# Patient Record
Sex: FEMALE | Race: OTHER | Employment: FULL TIME | URBAN - METROPOLITAN AREA
[De-identification: names, ages, dates, MRNs, and addresses within clinical notes are randomized per-mention and may not be internally consistent; named-entity substitution may affect disease eponyms.]

---

## 2017-03-14 ENCOUNTER — OFFICE VISIT (OUTPATIENT)
Dept: FAMILY MEDICINE CLINIC | Age: 52
End: 2017-03-14

## 2017-03-14 VITALS
BODY MASS INDEX: 29.25 KG/M2 | OXYGEN SATURATION: 99 % | DIASTOLIC BLOOD PRESSURE: 90 MMHG | HEART RATE: 94 BPM | HEIGHT: 66 IN | WEIGHT: 182 LBS | TEMPERATURE: 98.4 F | RESPIRATION RATE: 12 BRPM | SYSTOLIC BLOOD PRESSURE: 140 MMHG

## 2017-03-14 DIAGNOSIS — E78.00 HYPERCHOLESTEREMIA: Primary | ICD-10-CM

## 2017-03-14 DIAGNOSIS — Z12.11 SCREENING FOR COLON CANCER: ICD-10-CM

## 2017-03-14 NOTE — PROGRESS NOTES
HISTORY OF PRESENT ILLNESS  Gina Padron is a 54 y.o. female. HPI  Patient is here today for evaluation and treatment of: Cholesterol problem    Cholesterol:   Pt is on zocor for her cholesterol. She tolerates med well, ;  She attempts to manage her cholesterol with diet and exercise. She has no complaints. Pt will be due for colo in April when she turns 52. She agrees to getting this set up. PMH,  Meds, Allergies, Family History, Social history reviewed        Current Outpatient Prescriptions:     simvastatin (ZOCOR) 40 mg tablet, take 1 tablet by mouth every evening, Disp: 30 Tab, Rfl: 6    medroxyprogesterone (DEPO-PROVERA) 150 mg/mL Syrg, 150 mg by IntraMUSCular route once., Disp: , Rfl:     MULTIVITAMINS (MULTI-VITAMIN PO), Take  by mouth., Disp: , Rfl:     cyclobenzaprine (FLEXERIL) 10 mg tablet, Take 1 Tab by mouth three (3) times daily as needed for Muscle Spasm(s). , Disp: 30 Tab, Rfl: 0    Review of Systems   Constitutional: Negative for chills and fever. Cardiovascular: Negative for chest pain and palpitations. Physical Exam   Constitutional: She appears well-developed and well-nourished. No distress. Neck: Neck supple. No thyromegaly present. Cardiovascular: Normal rate and regular rhythm. Exam reveals no gallop and no friction rub. No murmur heard. Pulmonary/Chest: Breath sounds normal. No respiratory distress. She has no wheezes. She has no rales. Musculoskeletal: She exhibits no edema. Nursing note and vitals reviewed.      Visit Vitals    /90    Pulse 94    Temp 98.4 °F (36.9 °C) (Oral)    Resp 12    Ht 5' 6\" (1.676 m)    Wt 182 lb (82.6 kg)    SpO2 99%    BMI 29.38 kg/m2     Lab Results   Component Value Date/Time    Cholesterol, total 151 09/14/2016 08:59 AM    HDL Cholesterol 49 09/14/2016 08:59 AM    LDL, calculated 93.4 09/14/2016 08:59 AM    VLDL, calculated 8.6 09/14/2016 08:59 AM    Triglyceride 43 09/14/2016 08:59 AM    CHOL/HDL Ratio 3.1 09/14/2016 08:59 AM     Lab Results   Component Value Date/Time    Glucose 98 09/14/2016 08:59 AM       ASSESSMENT and PLAN    ICD-10-CM ICD-9-CM    1. Hypercholesteremia E78.00 272.0 LIPID PANEL      METABOLIC PANEL, BASIC      AST      ALT   2. Screening for colon cancer Z12.11 V76.51 REFERRAL TO COLON AND RECTAL SURGERY       As above,   above all stable unless otherwise noted  Labs as ordered  Continue current meds as ordered  Placed order for referral for colonoscopy  Follow-up Disposition:  Return in about 6 months (around 9/14/2017) for well exam.  An After Visit Summary was printed and given to the patient. This has been fully explained to the patient, who indicates understanding.

## 2017-03-14 NOTE — PROGRESS NOTES
1. Have you been to the ER, urgent care clinic since your last visit? Hospitalized since your last visit? No    2. Have you seen or consulted any other health care providers outside of the [address] [place] [address] since your last visit? Include any pap smears or colon screening.  No

## 2017-03-14 NOTE — PATIENT INSTRUCTIONS
Colon Cancer Screening: Care Instructions  Your Care Instructions  Colorectal cancer occurs in the colon or rectum. That's the lower part of your digestive system. It is the second-leading cause of cancer deaths in the [place]. It often starts with small growths called polyps in the colon or rectum. Polyps are usually found with screening tests. Depending on the type of test, any polyps found may be removed during the tests. Colorectal cancer usually does not cause symptoms at first. But regular tests can help find it early, before it spreads and becomes harder to treat. Experts advise routine tests for colon cancer for people starting at age 48. And they advise people with a higher risk of colon cancer to get tested sooner. Talk with your doctor about when you should start testing. Discuss which tests you need. Follow-up care is a key part of your treatment and safety. Be sure to make and go to all appointments, and call your doctor if you are having problems. It's also a good idea to know your test results and keep a list of the medicines you take. What are the main screening tests for colon cancer? · Stool tests. These include the fecal immunochemical test (FIT) and the fecal occult blood test (FOBT). These tests check stool samples for signs of cancer. If your test is positive, you will need to have a colonoscopy. · Sigmoidoscopy. This test lets your doctor look at the lining of your rectum and the lowest part of your colon. Your doctor uses a lighted tube called a sigmoidoscope. This test can't find cancers or polyps in the upper part of your colon. In some cases, polyps that are found can be removed. But if your doctor finds polyps, you will need to have a colonoscopy to check the upper part of your colon. · Colonoscopy. This test lets your doctor look at the lining of your rectum and your entire colon. The doctor uses a thin, flexible tool called a colonoscope.  It can also be used to remove polyps or get a tissue sample (biopsy). What tests do you need? The following guidelines are for people age 48 and over who are not at high risk for colorectal cancer. You may have at least one of these tests as directed by your doctor. · Fecal immunochemical test (FIT) or fecal occult blood test (FOBT) every year  · Sigmoidoscopy every 5 years  · Colonoscopy every 10 years  If you are age 76 and have had regular screenings or are age [de-identified] or older, you may not need screening. Talk with your doctor about when you need to be tested. And discuss which tests are right for you. Your doctor may recommend earlier or more frequent testing if you:  · Have had colorectal cancer before. · Have had colon polyps. · Have symptoms of colorectal cancer. These include blood in your stool and changes in your bowel habits. · Have a parent, brother or sister, or child with colon polyps or colorectal cancer. · Have a bowel disease. This includes ulcerative colitis and Crohn's disease. · Have a rare polyp syndrome that runs in families, such as familial adenomatous polyposis (FAP). · Have had radiation treatments to the belly or pelvis. When should you call for help? Call your doctor now or seek immediate medical care if:  · Your stools are black and tarlike or have streaks of blood. · You have pain or tenderness in your lower belly. Watch closely for changes in your health, and be sure to contact your doctor if:  · You have diarrhea, constipation, or another change in bowel habits that does not get better. · Your stools are narrower than usual.  · You lose weight and you don't know why. · You have any questions about your screening tests or schedule. Where can you learn more? Go to http://jose guadalupe-juan josé.info/. Enter M541 in the search box to learn more about \"Colon Cancer Screening: Care Instructions. \"  Current as of: July 26, 2016  Content Version: 11.1  © 5651-4817 "[employer].", [facility].  Care instructions adapted under license by [employer]wd[employer] (which disclaims liability or warranty for this information). If you have questions about a medical condition or this instruction, always ask your healthcare professional. [address] any warranty or liability for your use of this information.

## 2017-03-14 NOTE — MR AVS SNAPSHOT
Visit Information Date & Time Provider Department Dept. Phone Encounter #  
 3/14/2017  3:00 PM Milli Lozano [facility] [id number] Follow-up Instructions Return in about 6 months (around 9/14/2017) for well exam. Upcoming Health Maintenance Date Due DTaP/Tdap/Td series (1 - Tdap) 4/16/1988 PAP AKA CERVICAL CYTOLOGY 9/14/2018 Allergies as of 3/14/2017  Review Complete On: 3/14/2017 By: Milli Lozano MD  
 No Known Allergies Current Immunizations  Never Reviewed No immunizations on file. Not reviewed this visit You Were Diagnosed With   
  
 Codes Comments Hypercholesteremia    -  Primary ICD-10-CM: E78.00 ICD-9-CM: 272.0 Screening for colon cancer     ICD-10-CM: Z12.11 ICD-9-CM: V76.51 Vitals BP Pulse Temp Resp Height(growth percentile) Weight(growth percentile) 140/90 94 98.4 °F (36.9 °C) (Oral) 12 5' 6\" (1.676 m) 182 lb (82.6 kg) SpO2 BMI OB Status Smoking Status 99% 29.38 kg/m2 Injection Never Smoker Vitals History BMI and BSA Data Body Mass Index Body Surface Area  
 29.38 kg/m 2 1.96 m 2 Preferred Pharmacy Pharmacy Name Phone [facility] [address] [place] [address] [phone] Your Updated Medication List  
  
   
This list is accurate as of: 3/14/17  3:56 PM.  Always use your most recent med list.  
  
  
  
  
 DEPO-PROVERA 150 mg/mL Syrg Generic drug:  medroxyPROGESTERone 150 mg by IntraMUSCular route once. MULTI-VITAMIN PO Take  by mouth. simvastatin 40 mg tablet Commonly known as:  ZOCOR  
take 1 tablet by mouth every evening Follow-up Instructions Return in about 6 months (around 9/14/2017) for well exam. To-Do List   
 03/28/2017 Lab:  ALT   
  
 03/28/2017 Lab:  AST   
  
 03/28/2017 Lab:  LIPID PANEL   
  
 03/28/2017 Lab: METABOLIC PANEL, BASIC   
  
 03/28/2017 Outpatient Referral:  REFERRAL TO COLON AND RECTAL SURGERY Referral Information Referral ID Referred By Referred To  
  
 [id number] Scottie Aguilar MD   
   [address] [place] [address] [address] [facility] Lesele Escalante. Phone: [phone] Fax: [phone] Visits Status Start Date End Date 1 New Request 3/14/17 3/14/18 If your referral has a status of pending review or denied, additional information will be sent to support the outcome of this decision. Patient Instructions Colon Cancer Screening: Care Instructions Your Care Instructions Colorectal cancer occurs in the colon or rectum. That's the lower part of your digestive system. It is the second-leading cause of cancer deaths in the [place]. It often starts with small growths called polyps in the colon or rectum. Polyps are usually found with screening tests. Depending on the type of test, any polyps found may be removed during the tests. Colorectal cancer usually does not cause symptoms at first. But regular tests can help find it early, before it spreads and becomes harder to treat. Experts advise routine tests for colon cancer for people starting at age 48. And they advise people with a higher risk of colon cancer to get tested sooner. Talk with your doctor about when you should start testing. Discuss which tests you need. Follow-up care is a key part of your treatment and safety. Be sure to make and go to all appointments, and call your doctor if you are having problems. It's also a good idea to know your test results and keep a list of the medicines you take. What are the main screening tests for colon cancer? · Stool tests. These include the fecal immunochemical test (FIT) and the fecal occult blood test (FOBT).  These tests check stool samples for signs of cancer. If your test is positive, you will need to have a colonoscopy. · Sigmoidoscopy. This test lets your doctor look at the lining of your rectum and the lowest part of your colon. Your doctor uses a lighted tube called a sigmoidoscope. This test can't find cancers or polyps in the upper part of your colon. In some cases, polyps that are found can be removed. But if your doctor finds polyps, you will need to have a colonoscopy to check the upper part of your colon. · Colonoscopy. This test lets your doctor look at the lining of your rectum and your entire colon. The doctor uses a thin, flexible tool called a colonoscope. It can also be used to remove polyps or get a tissue sample (biopsy). What tests do you need? The following guidelines are for people age 48 and over who are not at high risk for colorectal cancer. You may have at least one of these tests as directed by your doctor. · Fecal immunochemical test (FIT) or fecal occult blood test (FOBT) every year · Sigmoidoscopy every 5 years · Colonoscopy every 10 years If you are age 76 and have had regular screenings or are age [de-identified] or older, you may not need screening. Talk with your doctor about when you need to be tested. And discuss which tests are right for you. Your doctor may recommend earlier or more frequent testing if you: 
· Have had colorectal cancer before. · Have had colon polyps. · Have symptoms of colorectal cancer. These include blood in your stool and changes in your bowel habits. · Have a parent, brother or sister, or child with colon polyps or colorectal cancer. · Have a bowel disease. This includes ulcerative colitis and Crohn's disease. · Have a rare polyp syndrome that runs in families, such as familial adenomatous polyposis (FAP). · Have had radiation treatments to the belly or pelvis. When should you call for help? Call your doctor now or seek immediate medical care if: · Your stools are black and tarlike or have streaks of blood. · You have pain or tenderness in your lower belly. Watch closely for changes in your health, and be sure to contact your doctor if: 
· You have diarrhea, constipation, or another change in bowel habits that does not get better. · Your stools are narrower than usual. 
· You lose weight and you don't know why. · You have any questions about your screening tests or schedule. Where can you learn more? Go to http://jose guadalupe-juan josé.info/. Enter M541 in the search box to learn more about \"Colon Cancer Screening: Care Instructions. \" Current as of: July 26, 2016 Content Version: 11.1 © 0104-1697 "[employer]. Care instructions adapted under license by [employer] (which disclaims liability or warranty for this information). If you have questions about a medical condition or this instruction, always ask your healthcare professional. [place] [address] any warranty or liability for your use of this information. Introducing \[facility]\"" & HEALTH SERVICES! Dear Isabella Riddle: Thank you for requesting a Flowbox account. Our records indicate that you already have an active Flowbox account. You can access your account anytime at [email or web address] Did you know that you can access your hospital and ER discharge instructions at any time in Flowbox? You can also review all of your test results from your hospital stay or ER visit. Additional Information If you have questions, please visit the Frequently Asked Questions section of the Flowbox website at [email or web address] Remember, Flowbox is NOT to be used for urgent needs. For medical emergencies, dial 911. Now available from your iPhone and Android! Please provide this summary of care documentation to your next provider. Your primary care clinician is listed as [address].  If you have any questions after today's visit, please call [phone].

## 2017-05-09 ENCOUNTER — HOSPITAL ENCOUNTER (OUTPATIENT)
Dept: LAB | Age: 52
Discharge: HOME | End: 2017-05-09
Payer: OTHER GOVERNMENT

## 2017-05-09 DIAGNOSIS — E78.00 HYPERCHOLESTEREMIA: ICD-10-CM

## 2017-05-09 LAB
ALT SERPL-CCNC: 32 U/L (ref 13–56)
ANION GAP BLD CALC-SCNC: 10 MMOL/L (ref 3–18)
AST SERPL W P-5'-P-CCNC: 21 U/L (ref 15–37)
BUN SERPL-MCNC: 17 MG/DL (ref 7–18)
BUN/CREAT SERPL: 20 (ref 12–20)
CALCIUM SERPL-MCNC: 9.4 MG/DL (ref 8.5–10.1)
CHLORIDE SERPL-SCNC: 106 MMOL/L (ref 100–108)
CHOLEST SERPL-MCNC: 156 MG/DL
CO2 SERPL-SCNC: 25 MMOL/L (ref 21–32)
CREAT SERPL-MCNC: 0.87 MG/DL (ref 0.6–1.3)
GLUCOSE SERPL-MCNC: 102 MG/DL (ref 74–99)
HDLC SERPL-MCNC: 44 MG/DL (ref 40–60)
HDLC SERPL: 3.5 {RATIO} (ref 0–5)
LDLC SERPL CALC-MCNC: 104 MG/DL (ref 0–100)
LIPID PROFILE,FLP: ABNORMAL
POTASSIUM SERPL-SCNC: 3.8 MMOL/L (ref 3.5–5.5)
SODIUM SERPL-SCNC: 141 MMOL/L (ref 136–145)
TRIGL SERPL-MCNC: 40 MG/DL (ref ?–150)
VLDLC SERPL CALC-MCNC: 8 MG/DL

## 2017-05-09 PROCEDURE — 80061 LIPID PANEL: CPT | Performed by: FAMILY MEDICINE

## 2017-05-09 PROCEDURE — 80048 BASIC METABOLIC PNL TOTAL CA: CPT | Performed by: FAMILY MEDICINE

## 2017-05-09 PROCEDURE — 36415 COLL VENOUS BLD VENIPUNCTURE: CPT | Performed by: FAMILY MEDICINE

## 2017-05-09 PROCEDURE — 84450 TRANSFERASE (AST) (SGOT): CPT | Performed by: FAMILY MEDICINE

## 2017-05-09 PROCEDURE — 84460 ALANINE AMINO (ALT) (SGPT): CPT | Performed by: FAMILY MEDICINE

## 2017-06-12 RX ORDER — SIMVASTATIN 40 MG/1
TABLET, FILM COATED ORAL
Qty: 30 TAB | Refills: 6 | Status: SHIPPED | OUTPATIENT
Start: 2017-06-12 | End: 2018-03-11 | Stop reason: SDUPTHER

## 2017-08-02 ENCOUNTER — HOSPITAL ENCOUNTER (OUTPATIENT)
Dept: ULTRASOUND IMAGING | Age: 52
Discharge: HOME | End: 2017-08-02
Attending: INTERNAL MEDICINE
Payer: OTHER GOVERNMENT

## 2017-08-02 DIAGNOSIS — E04.2 NONTOXIC MULTINODULAR GOITER: ICD-10-CM

## 2017-08-02 PROCEDURE — 76536 US EXAM OF HEAD AND NECK: CPT

## 2017-09-14 ENCOUNTER — OFFICE VISIT (OUTPATIENT)
Dept: FAMILY MEDICINE CLINIC | Age: 52
End: 2017-09-14

## 2017-09-14 ENCOUNTER — HOSPITAL ENCOUNTER (OUTPATIENT)
Dept: LAB | Age: 52
Discharge: HOME | End: 2017-09-14
Payer: OTHER GOVERNMENT

## 2017-09-14 VITALS
DIASTOLIC BLOOD PRESSURE: 86 MMHG | WEIGHT: 182 LBS | TEMPERATURE: 98.3 F | RESPIRATION RATE: 16 BRPM | BODY MASS INDEX: 29.25 KG/M2 | OXYGEN SATURATION: 98 % | HEIGHT: 66 IN | HEART RATE: 100 BPM | SYSTOLIC BLOOD PRESSURE: 126 MMHG

## 2017-09-14 DIAGNOSIS — E78.00 HYPERCHOLESTEREMIA: Primary | ICD-10-CM

## 2017-09-14 DIAGNOSIS — E78.00 HYPERCHOLESTEREMIA: ICD-10-CM

## 2017-09-14 LAB
ALT SERPL-CCNC: 24 U/L (ref 13–56)
ANION GAP SERPL CALC-SCNC: 7 MMOL/L (ref 3–18)
AST SERPL-CCNC: 14 U/L (ref 15–37)
BUN SERPL-MCNC: 14 MG/DL (ref 7–18)
BUN/CREAT SERPL: 16 (ref 12–20)
CALCIUM SERPL-MCNC: 9 MG/DL (ref 8.5–10.1)
CHLORIDE SERPL-SCNC: 109 MMOL/L (ref 100–108)
CHOLEST SERPL-MCNC: 169 MG/DL
CO2 SERPL-SCNC: 25 MMOL/L (ref 21–32)
CREAT SERPL-MCNC: 0.86 MG/DL (ref 0.6–1.3)
GLUCOSE SERPL-MCNC: 103 MG/DL (ref 74–99)
HDLC SERPL-MCNC: 46 MG/DL (ref 40–60)
HDLC SERPL: 3.7 {RATIO} (ref 0–5)
LDLC SERPL CALC-MCNC: 113 MG/DL (ref 0–100)
LIPID PROFILE,FLP: ABNORMAL
POTASSIUM SERPL-SCNC: 3.8 MMOL/L (ref 3.5–5.5)
SODIUM SERPL-SCNC: 141 MMOL/L (ref 136–145)
TRIGL SERPL-MCNC: 50 MG/DL (ref ?–150)
VLDLC SERPL CALC-MCNC: 10 MG/DL

## 2017-09-14 PROCEDURE — 80048 BASIC METABOLIC PNL TOTAL CA: CPT | Performed by: FAMILY MEDICINE

## 2017-09-14 PROCEDURE — 36415 COLL VENOUS BLD VENIPUNCTURE: CPT | Performed by: FAMILY MEDICINE

## 2017-09-14 PROCEDURE — 84450 TRANSFERASE (AST) (SGOT): CPT | Performed by: FAMILY MEDICINE

## 2017-09-14 PROCEDURE — 84460 ALANINE AMINO (ALT) (SGPT): CPT | Performed by: FAMILY MEDICINE

## 2017-09-14 PROCEDURE — 80061 LIPID PANEL: CPT | Performed by: FAMILY MEDICINE

## 2017-09-14 NOTE — PROGRESS NOTES
HISTORY OF PRESENT ILLNESS  Yusuf Li is a 50 y.o. female. HPI  Patient is here today for evaluation and treatment of: Cholesterol problem    Cholesterol:   She is on zocor for cholesterol; Takes zocor daily. She feels well; no new complaints. Will be getting a thyroid US biopsy soon. She is followed by  Dr. Powell. She declines a well exam and flu shot today. Blood sugar was noted to be elevated at last check; will be checked again today;  Pt is fasting      Current Outpatient Prescriptions:     simvastatin (ZOCOR) 40 mg tablet, take 1 tablet by mouth every evening, Disp: 30 Tab, Rfl: 6    medroxyprogesterone (DEPO-PROVERA) 150 mg/mL Syrg, 150 mg by IntraMUSCular route once., Disp: , Rfl:     MULTIVITAMINS (MULTI-VITAMIN PO), Take  by mouth., Disp: , Rfl:     Review of Systems   Constitutional: Negative for fever. Cardiovascular: Negative for chest pain and palpitations. Physical Exam   Constitutional: She appears well-developed and well-nourished. No distress. Neck: Thyromegaly present. Cardiovascular: Normal rate and regular rhythm. Exam reveals no gallop and no friction rub. No murmur heard. Pulmonary/Chest: Breath sounds normal. No respiratory distress. She has no wheezes. She has no rales. Musculoskeletal: Normal range of motion. She exhibits no edema. Nursing note and vitals reviewed. ASSESSMENT and PLAN    ICD-10-CM ICD-9-CM    1. Hypercholesteremia Z00.87 225.2 METABOLIC PANEL, BASIC      LIPID PANEL      AST      ALT       As above,   above all stable unless otherwise noted  Labs as ordered  Continue current meds as ordered  Follow-up Disposition:  Return in about 6 months (around 3/14/2018) for well exam.  An After Visit Summary was printed and given to the patient. This has been fully explained to the patient, who indicates understanding.

## 2017-09-14 NOTE — PROGRESS NOTES
1. Have you been to the ER, urgent care clinic since your last visit? Hospitalized since your last visit? No    2. Have you seen or consulted any other health care providers outside of the [address] [place] [address] since your last visit? Include any pap smears or colon screening.  No

## 2017-09-14 NOTE — PATIENT INSTRUCTIONS

## 2017-09-14 NOTE — MR AVS SNAPSHOT
Visit Information Date & Time Provider Department Dept. Phone Encounter #  
 9/14/2017  8:20 AM Itzel Washington MD [facility] [place] [id number] Upcoming Health Maintenance Date Due DTaP/Tdap/Td series (1 - Tdap) 4/16/1988 FOBT Q 1 YEAR AGE 50-75 2/15/2018* PAP AKA CERVICAL CYTOLOGY 9/14/2018 BREAST CANCER SCRN MAMMOGRAM 12/14/2018 *Topic was postponed. The date shown is not the original due date. Allergies as of 9/14/2017  Review Complete On: 9/14/2017 By: Eliel Parry LPN No Known Allergies Current Immunizations  Never Reviewed No immunizations on file. Not reviewed this visit You Were Diagnosed With   
  
 Codes Comments Hypercholesteremia    -  Primary ICD-10-CM: E78.00 ICD-9-CM: 272.0 Vitals BP Pulse Temp Resp Height(growth percentile) Weight(growth percentile) 126/86 (BP 1 Location: Left arm, BP Patient Position: Sitting) 100 98.3 °F (36.8 °C) (Oral) 16 5' 6\" (1.676 m) 182 lb (82.6 kg) SpO2 BMI OB Status Smoking Status 98% 29.38 kg/m2 Injection Never Smoker BMI and BSA Data Body Mass Index Body Surface Area  
 29.38 kg/m 2 1.96 m 2 Preferred Pharmacy Pharmacy Name Phone [facility] [address] [place] [address], [address] [place], [place] [address] [phone] Your Updated Medication List  
  
   
This list is accurate as of: 9/14/17  8:48 AM.  Always use your most recent med list.  
  
  
  
  
 DEPO-PROVERA 150 mg/mL Syrg Generic drug:  medroxyPROGESTERone 150 mg by IntraMUSCular route once. MULTI-VITAMIN PO Take  by mouth. simvastatin 40 mg tablet Commonly known as:  ZOCOR  
take 1 tablet by mouth every evening To-Do List   
 09/14/2017 Lab:  ALT   
  
 09/14/2017 Lab:  AST   
  
 09/14/2017 Lab:  LIPID PANEL   
  
 09/14/2017 Lab:  METABOLIC PANEL, BASIC Patient Instructions High Cholesterol: Care Instructions Your Care Instructions Cholesterol is a type of fat in your blood. It is needed for many body functions, such as making new cells. Cholesterol is made by your body. It also comes from food you eat. High cholesterol means that you have too much of the fat in your blood. This raises your risk of a heart attack and stroke. LDL and HDL are part of your total cholesterol. LDL is the \"bad\" cholesterol. High LDL can raise your risk for heart disease, heart attack, and stroke. HDL is the \"good\" cholesterol. It helps clear bad cholesterol from the body. High HDL is linked with a lower risk of heart disease, heart attack, and stroke. Your cholesterol levels help your doctor find out your risk for having a heart attack or stroke. You and your doctor can talk about whether you need to lower your risk and what treatment is best for you. A heart-healthy lifestyle along with medicines can help lower your cholesterol and your risk. The way you choose to lower your risk will depend on how high your risk is for heart attack and stroke. It will also depend on how you feel about taking medicines. Follow-up care is a key part of your treatment and safety. Be sure to make and go to all appointments, and call your doctor if you are having problems. It's also a good idea to know your test results and keep a list of the medicines you take. How can you care for yourself at home? · Eat a variety of foods every day. Good choices include fruits, vegetables, whole grains (like oatmeal), dried beans and peas, nuts and seeds, soy products (like tofu), and fat-free or low-fat dairy products. · Replace butter, margarine, and hydrogenated or partially hydrogenated oils with olive and canola oils. (Canola oil margarine without trans fat is fine.) · Replace red meat with fish, poultry, and soy protein (like tofu). · Limit processed and packaged foods like chips, crackers, and cookies. · Bake, broil, or steam foods. Don't amyaa them. · Be physically active. Get at least 30 minutes of exercise on most days of the week. Walking is a good choice. You also may want to do other activities, such as running, swimming, cycling, or playing tennis or team sports. · Stay at a healthy weight or lose weight by making the changes in eating and physical activity listed above. Losing just a small amount of weight, even 5 to 10 pounds, can reduce your risk for having a heart attack or stroke. · Do not smoke. When should you call for help? Watch closely for changes in your health, and be sure to contact your doctor if: 
· You need help making lifestyle changes. · You have questions about your medicine. Where can you learn more? Go to http://jose guadalupe-juan josé.info/. Enter G645 in the search box to learn more about \"High Cholesterol: Care Instructions. \" Current as of: April 3, 2017 Content Version: 11.3 © 6332-7875 Vinted. Care instructions adapted under license by TuVox (which disclaims liability or warranty for this information). If you have questions about a medical condition or this instruction, always ask your healthcare professional. [address] any warranty or liability for your use of this information. Introducing [facility] & HEALTH SERVICES! Dear Jairo Salazar: Thank you for requesting a Now In Store account. Our records indicate that you already have an active Now In Store account. You can access your account anytime at [email or web address] Did you know that you can access your hospital and ER discharge instructions at any time in Now In Store? You can also review all of your test results from your hospital stay or ER visit. Additional Information If you have questions, please visit the Frequently Asked Questions section of the Now In Store website at [email or web address] Remember, MyChart is NOT to be used for urgent needs. For medical emergencies, dial 911. Now available from your iPhone and Android! Please provide this summary of care documentation to your next provider. Your primary care clinician is listed as [address]. If you have any questions after today's visit, please call [phone].

## 2018-03-12 RX ORDER — SIMVASTATIN 40 MG/1
TABLET, FILM COATED ORAL
Qty: 30 TAB | Refills: 6 | Status: SHIPPED | OUTPATIENT
Start: 2018-03-12 | End: 2018-11-08 | Stop reason: SDUPTHER

## 2018-03-14 ENCOUNTER — OFFICE VISIT (OUTPATIENT)
Dept: FAMILY MEDICINE CLINIC | Age: 53
End: 2018-03-14

## 2018-03-14 VITALS
BODY MASS INDEX: 29.25 KG/M2 | TEMPERATURE: 98.3 F | OXYGEN SATURATION: 96 % | SYSTOLIC BLOOD PRESSURE: 140 MMHG | RESPIRATION RATE: 16 BRPM | WEIGHT: 182 LBS | HEIGHT: 66 IN | DIASTOLIC BLOOD PRESSURE: 72 MMHG | HEART RATE: 102 BPM

## 2018-03-14 DIAGNOSIS — M62.838 MUSCLE SPASM OF LEFT SHOULDER: ICD-10-CM

## 2018-03-14 DIAGNOSIS — R03.0 ELEVATED BP WITHOUT DIAGNOSIS OF HYPERTENSION: ICD-10-CM

## 2018-03-14 DIAGNOSIS — E78.00 HYPERCHOLESTEREMIA: Primary | ICD-10-CM

## 2018-03-14 RX ORDER — CYCLOBENZAPRINE HCL 10 MG
10 TABLET ORAL
Qty: 30 TAB | Refills: 0 | Status: SHIPPED | OUTPATIENT
Start: 2018-03-14 | End: 2018-03-21

## 2018-03-14 NOTE — MR AVS SNAPSHOT
[facility] 
 
 
 [address] [address] [address] [address] 
[phone] Patient: Shanta Ramos MRN: [id number] :1965 Visit Information Date & Time Provider Department Dept. Phone Encounter #  
 3/14/2018  8:20 AM Juan Pablo Camilo[address] [phone] [id number] Follow-up Instructions Return in about 3 months (around 2018) for BP. Upcoming Health Maintenance Date Due DTaP/Tdap/Td series (1 - Tdap) 1988 FOBT Q 1 YEAR AGE 50-75 2017 PAP AKA CERVICAL CYTOLOGY 2018 BREAST CANCER SCRN MAMMOGRAM 2018 Allergies as of 3/14/2018  Review Complete On: 3/14/2018 By: Michelle Garcia LPN No Known Allergies Current Immunizations  Never Reviewed No immunizations on file. Not reviewed this visit You Were Diagnosed With   
  
 Codes Comments Hypercholesteremia    -  Primary ICD-10-CM: E78.00 ICD-9-CM: 272.0 Muscle spasm of left shoulder     ICD-10-CM: K33.910 ICD-9-CM: 728.85 Elevated BP without diagnosis of hypertension     ICD-10-CM: R03.0 ICD-9-CM: 796.2 Vitals BP Pulse Temp Resp Height(growth percentile) Weight(growth percentile) 140/72 (!) 102 98.3 °F (36.8 °C) (Oral) 16 5' 6\" (1.676 m) 182 lb (82.6 kg) SpO2 BMI OB Status Smoking Status 96% 29.38 kg/m2 Injection Never Smoker Vitals History BMI and BSA Data Body Mass Index Body Surface Area  
 29.38 kg/m 2 1.96 m 2 Preferred Pharmacy Pharmacy Name Phone [facility], [facility] [phone] Your Updated Medication List  
  
   
This list is accurate as of 3/14/18  8:59 AM.  Always use your most recent med list.  
  
  
  
  
 cyclobenzaprine 10 mg tablet Commonly known as:  FLEXERIL Take 1 Tab by mouth three (3) times daily as needed for Muscle Spasm(s). DEPO-PROVERA 150 mg/mL Syrg Generic drug:  medroxyPROGESTERone 150 mg by IntraMUSCular route once. MULTI-VITAMIN PO Take  by mouth. simvastatin 40 mg tablet Commonly known as:  ZOCOR  
take 1 tablet by mouth every evening Prescriptions Sent to Pharmacy Refills  
 cyclobenzaprine (FLEXERIL) 10 mg tablet 0 Sig: Take 1 Tab by mouth three (3) times daily as needed for Muscle Spasm(s). Class: Normal  
 [id number] [address] [place] [address], [address] [place] [address] #: [phone] Route: Oral  
  
Follow-up Instructions Return in about 3 months (around 6/14/2018) for BP. To-Do List   
 03/14/2018 Lab:  ALT   
  
 03/14/2018 Lab:  AST   
  
 03/14/2018 Lab:  LIPID PANEL   
  
 03/14/2018 Lab:  TSH 3RD GENERATION Patient Instructions Neck Spasm: Care Instructions Your Care Instructions A neck spasm is sudden tightness and pain in your neck muscles. A spasm may be caused by some activities or repeated movements. For example, you may be more likely to have a neck spasm if you slouch, paint a ceiling, work at a computer, or sleep with your neck twisted. But the cause isn't always clear. Home treatment includes using heat or ice, taking over-the-counter (OTC) pain medicines, and avoiding activities that may lead to neck pain. Gentle stretching, or treatments such as massage or manipulation, may also help ease a neck spasm. For a neck spasm that doesn't get better with home care, your doctor may prescribe medicine. He or she may also suggest exercise or physical therapy to help strengthen or relax your neck muscles. Follow-up care is a key part of your treatment and safety. Be sure to make and go to all appointments, and call your doctor if you are having problems. It's also a good idea to know your test results and keep a list of the medicines you take. How can you care for yourself at home? · To relieve pain, use heat or ice (whichever feels better) on the affected area. ¨ Put a warm water bottle, a heating pad set on low, or a warm cloth on your neck. Put a thin cloth between the heating pad and your skin. Do not go to sleep with a heating pad on your skin. ¨ Try ice or a cold pack on the area for 10 to 20 minutes at a time. Put a thin cloth between the ice and your skin. · Ask your doctor if you can take acetaminophen (such as Tylenol) or nonsteroidal anti-inflammatory drugs, such as ibuprofen or naproxen. Your doctor can prescribe stronger medicines if needed. Be safe with medicines. Read and follow all instructions on the label. · Stretch your muscles every day, especially before and after exercise and at bedtime. Regular stretching can help relax your muscles. · Try to find a pillow and a position in bed that help improve your night's rest. 
· Try to stay active. It's best to start activity slowly. If an exercise makes your painworse, stop doing it. When should you call for help? Call 911 anytime you think you may need emergency care. For example, call if: 
? · You are unable to move an arm or a leg at all. ?Call your doctor now or seek immediate medical care if: 
? · You have new or worse symptoms in your arms, legs, belly, or buttocks. Symptoms may include: ¨ Numbness or tingling. ¨ Weakness. ¨ Pain. ? · You lose bladder or bowel control. ? Watch closely for changes in your health, and be sure to contact your doctor if: 
? · You do not get better as expected. Where can you learn more? Go to http://jose guadlaupe-juan josé.info/. Enter V837 in the search box to learn more about \"Neck Spasm: Care Instructions. \" Current as of: March 21, 2017 Content Version: 11.4 © 9040-3917 [employer].  Care instructions adapted under license by [facility] (which disclaims liability or warranty for this information). If you have questions about a medical condition or this instruction, always ask your healthcare professional. [address] any warranty or liability for your use of this information. Introducing [facility] SERVICES! Dear Lolly Campos: Thank you for requesting a [employer] account. Our records indicate that you already have an active [employer] account. You can access your account anytime at [email or web address] Did you know that you can access your hospital and ER discharge instructions at any time in [employer]? You can also review all of your test results from your hospital stay or ER visit. Additional Information If you have questions, please visit the Frequently Asked Questions section of the [employer] website at [email or web address] Remember, [employer] is NOT to be used for urgent needs. For medical emergencies, dial 911. Now available from your iPhone and Android! Please provide this summary of care documentation to your next provider. Your primary care clinician is listed as [address]. If you have any questions after today's visit, please call [phone].

## 2018-03-14 NOTE — PATIENT INSTRUCTIONS
Neck Spasm: Care Instructions  Your Care Instructions  A neck spasm is sudden tightness and pain in your neck muscles. A spasm may be caused by some activities or repeated movements. For example, you may be more likely to have a neck spasm if you slouch, paint a ceiling, work at a computer, or sleep with your neck twisted. But the cause isn't always clear. Home treatment includes using heat or ice, taking over-the-counter (OTC) pain medicines, and avoiding activities that may lead to neck pain. Gentle stretching, or treatments such as massage or manipulation, may also help ease a neck spasm. For a neck spasm that doesn't get better with home care, your doctor may prescribe medicine. He or she may also suggest exercise or physical therapy to help strengthen or relax your neck muscles. Follow-up care is a key part of your treatment and safety. Be sure to make and go to all appointments, and call your doctor if you are having problems. It's also a good idea to know your test results and keep a list of the medicines you take. How can you care for yourself at home? · To relieve pain, use heat or ice (whichever feels better) on the affected area. ¨ Put a warm water bottle, a heating pad set on low, or a warm cloth on your neck. Put a thin cloth between the heating pad and your skin. Do not go to sleep with a heating pad on your skin. ¨ Try ice or a cold pack on the area for 10 to 20 minutes at a time. Put a thin cloth between the ice and your skin. · Ask your doctor if you can take acetaminophen (such as Tylenol) or nonsteroidal anti-inflammatory drugs, such as ibuprofen or naproxen. Your doctor can prescribe stronger medicines if needed. Be safe with medicines. Read and follow all instructions on the label. · Stretch your muscles every day, especially before and after exercise and at bedtime. Regular stretching can help relax your muscles.   · Try to find a pillow and a position in bed that help improve your night's rest.  · Try to stay active. It's best to start activity slowly. If an exercise makes your painworse, stop doing it. When should you call for help? Call 911 anytime you think you may need emergency care. For example, call if:  ? · You are unable to move an arm or a leg at all. ?Call your doctor now or seek immediate medical care if:  ? · You have new or worse symptoms in your arms, legs, belly, or buttocks. Symptoms may include:  ¨ Numbness or tingling. ¨ Weakness. ¨ Pain. ? · You lose bladder or bowel control. ? Watch closely for changes in your health, and be sure to contact your doctor if:  ? · You do not get better as expected. Where can you learn more? Go to http://jose guadalupe-juan josé.info/. Enter I010 in the search box to learn more about \"Neck Spasm: Care Instructions. \"  Current as of: March 21, 2017  Content Version: 11.4  © 7389-9118 [employer]. Care instructions adapted under license by [employer] (which disclaims liability or warranty for this information). If you have questions about a medical condition or this instruction, always ask your healthcare professional. [address] [address] any warranty or liability for your use of this information.

## 2018-03-14 NOTE — PROGRESS NOTES
HISTORY OF PRESENT ILLNESS  Isaac Felipe is a 50 y.o. female. HPI  Patient is here today for evaluation and treatment of: Cholesterol problem    Cholesterol:   Pt is on zocor; She is exercising; she attempts a lower cholesterol diet. She is not fasting. Gets ocassional muscle tightness in leftward neck and arm; had a muscle relaxant and this helped; Requests a refill. She has had no injury    BP initially elevated; better at second check; no h/o HTN      Current Outpatient Prescriptions:     simvastatin (ZOCOR) 40 mg tablet, take 1 tablet by mouth every evening, Disp: 30 Tab, Rfl: 6    medroxyprogesterone (DEPO-PROVERA) 150 mg/mL Syrg, 150 mg by IntraMUSCular route once., Disp: , Rfl:     MULTIVITAMINS (MULTI-VITAMIN PO), Take  by mouth., Disp: , Rfl:     PMH,  Meds, Allergies, Family History, Social history reviewed    Review of Systems   Constitutional: Negative for chills and fever. Respiratory: Negative for shortness of breath and wheezing. Cardiovascular: Negative for chest pain and palpitations. Physical Exam   Constitutional: She appears well-developed and well-nourished. No distress. Cardiovascular: Normal rate and regular rhythm. Exam reveals no gallop and no friction rub. No murmur heard. Pulmonary/Chest: Breath sounds normal. No respiratory distress. She has no wheezes. Musculoskeletal: She exhibits tenderness (in left upper arm; ROM left shoulder intact). Nursing note and vitals reviewed.     Lab Results   Component Value Date/Time    Cholesterol, total 169 09/14/2017 08:53 AM    HDL Cholesterol 46 09/14/2017 08:53 AM    LDL, calculated 113 (H) 09/14/2017 08:53 AM    VLDL, calculated 10 09/14/2017 08:53 AM    Triglyceride 50 09/14/2017 08:53 AM    CHOL/HDL Ratio 3.7 09/14/2017 08:53 AM     Lab Results   Component Value Date/Time    Sodium 141 09/14/2017 08:53 AM    Potassium 3.8 09/14/2017 08:53 AM    Chloride 109 (H) 09/14/2017 08:53 AM    CO2 25 09/14/2017 08:53 AM Anion gap 7 09/14/2017 08:53 AM    Glucose 103 (H) 09/14/2017 08:53 AM    BUN 14 09/14/2017 08:53 AM    Creatinine 0.86 09/14/2017 08:53 AM    BUN/Creatinine ratio 16 09/14/2017 08:53 AM    GFR est AA >60 09/14/2017 08:53 AM    GFR est non-AA >60 09/14/2017 08:53 AM    Calcium 9.0 09/14/2017 08:53 AM       ASSESSMENT and PLAN    ICD-10-CM ICD-9-CM    1. Hypercholesteremia- moderate control E78.00 272.0 LIPID PANEL      AST      ALT   2. Muscle spasm of left shoulder- recurrent M62.838 728.85 cyclobenzaprine (FLEXERIL) 10 mg tablet   3. Elevated BP without diagnosis of hypertension- new R03.0 796.2 TSH 3RD GENERATION       As above,    treatment plan as listed below  Orders Placed This Encounter    LIPID PANEL    AST    ALT    TSH 3RD GENERATION    cyclobenzaprine (FLEXERIL) 10 mg tablet     Warned of sedative effects of flexeril  above all stable unless otherwise noted  Labs as ordered  Continue current meds as ordered  Follow-up Disposition:  Return in about 3 months (around 6/14/2018) for BP. An After Visit Summary was printed and given to the patient.

## 2018-03-21 ENCOUNTER — HOSPITAL ENCOUNTER (EMERGENCY)
Age: 53
Discharge: HOME | End: 2018-03-21
Attending: EMERGENCY MEDICINE
Payer: OTHER GOVERNMENT

## 2018-03-21 VITALS
WEIGHT: 180 LBS | TEMPERATURE: 98.5 F | SYSTOLIC BLOOD PRESSURE: 156 MMHG | DIASTOLIC BLOOD PRESSURE: 73 MMHG | HEART RATE: 117 BPM | RESPIRATION RATE: 18 BRPM | HEIGHT: 66 IN | BODY MASS INDEX: 28.93 KG/M2 | OXYGEN SATURATION: 98 %

## 2018-03-21 DIAGNOSIS — J10.1 INFLUENZA A: Primary | ICD-10-CM

## 2018-03-21 LAB
FLUAV AG NPH QL IA: NEGATIVE
FLUBV AG NOSE QL IA: POSITIVE

## 2018-03-21 PROCEDURE — 74011250637 HC RX REV CODE- 250/637: Performed by: EMERGENCY MEDICINE

## 2018-03-21 PROCEDURE — 87804 INFLUENZA ASSAY W/OPTIC: CPT | Performed by: EMERGENCY MEDICINE

## 2018-03-21 PROCEDURE — 87081 CULTURE SCREEN ONLY: CPT | Performed by: EMERGENCY MEDICINE

## 2018-03-21 PROCEDURE — 99283 EMERGENCY DEPT VISIT LOW MDM: CPT

## 2018-03-21 RX ORDER — OSELTAMIVIR PHOSPHATE 75 MG/1
75 CAPSULE ORAL 2 TIMES DAILY
Qty: 10 CAP | Refills: 0 | Status: SHIPPED | OUTPATIENT
Start: 2018-03-21 | End: 2018-03-26

## 2018-03-21 RX ORDER — ACETAMINOPHEN 500 MG
1000 TABLET ORAL
Status: COMPLETED | OUTPATIENT
Start: 2018-03-21 | End: 2018-03-21

## 2018-03-21 RX ADMIN — ACETAMINOPHEN 1000 MG: 500 TABLET ORAL at 13:23

## 2018-03-21 NOTE — ED PROVIDER NOTES
Patient is a 50 y.o. female presenting with fever, sore throat, and cough. The history is provided by the patient. No  was used. Fever    This is a new problem. The current episode started more than 2 days ago. The problem occurs constantly. The problem has not changed since onset. The maximum temperature noted was 100 - 100.9 F. Associated symptoms include sore throat and cough. She has tried nothing for the symptoms. Sore Throat    Associated symptoms include cough. Cough   Associated symptoms include sore throat. Past Medical History:   Diagnosis Date    Facet arthropathy     Heart murmur 2006    High cholesterol     Iritis     Low back pain     Nausea     Spinal stenosis        Past Surgical History:   Procedure Laterality Date    HX ORTHOPAEDIC  2009    back surgery         Family History:   Problem Relation Age of Onset    Diabetes Mother     Heart Disease Father      CAD       Social History     Social History    Marital status:      Spouse name: N/A    Number of children: N/A    Years of education: N/A     Occupational History    Not on file. Social History Main Topics    Smoking status: Never Smoker    Smokeless tobacco: Never Used    Alcohol use No    Drug use: No    Sexual activity: Not on file     Other Topics Concern    Not on file     Social History Narrative         ALLERGIES: Review of patient's allergies indicates no known allergies. Review of Systems   Constitutional: Positive for fever. HENT: Positive for sore throat. Respiratory: Positive for cough. Cardiovascular: Negative. Gastrointestinal: Negative. Genitourinary: Negative. Musculoskeletal: Negative. Skin: Negative. Neurological: Negative. Hematological: Negative. Psychiatric/Behavioral: Negative.         Vitals:    03/21/18 1315 03/21/18 1400   BP: 154/85    Pulse: (!) 120    Resp: 18    Temp: 100.4 °F (38 °C) 98.5 °F (36.9 °C)   SpO2: 98%    Weight: 81.6 kg (180 lb)    Height: 5' 6\" (1.676 m)             Physical Exam   Constitutional: She is oriented to person, place, and time. She appears well-developed and well-nourished. HENT:   Head: Normocephalic and atraumatic. Nose: Nose normal.   Mouth/Throat: No oropharyngeal exudate. Eyes: Conjunctivae and EOM are normal. Pupils are equal, round, and reactive to light. Neck: Normal range of motion. Neck supple. Cardiovascular: Regular rhythm and normal heart sounds. Pulmonary/Chest: Effort normal and breath sounds normal.   Abdominal: Soft. Bowel sounds are normal.   Musculoskeletal: Normal range of motion. Neurological: She is alert and oriented to person, place, and time. Skin: Skin is warm and dry. Nursing note and vitals reviewed.        MDM  Number of Diagnoses or Management Options  Diagnosis management comments: Viral upper respiratory infection  Influenza       Amount and/or Complexity of Data Reviewed  Clinical lab tests: reviewed          ED Course       Procedures

## 2018-03-21 NOTE — ED TRIAGE NOTES
Patient c/o fever, cough and sore throat that began on Monday evening. Patient advises that she took motrin this morning. Denies productive cough.

## 2018-03-21 NOTE — DISCHARGE INSTRUCTIONS

## 2018-03-21 NOTE — ED NOTES
Current Discharge Medication List      START taking these medications    Details   oseltamivir (TAMIFLU) 75 mg capsule Take 1 Cap by mouth two (2) times a day for 5 days. Indications: INFLUENZA  Qty: 10 Cap, Refills: 0         CONTINUE these medications which have NOT CHANGED    Details   simvastatin (ZOCOR) 40 mg tablet take 1 tablet by mouth every evening  Qty: 30 Tab, Refills: 6      medroxyprogesterone (DEPO-PROVERA) 150 mg/mL Syrg 150 mg by IntraMUSCular route once. MULTIVITAMINS (MULTI-VITAMIN PO) Take  by mouth. Patient armband removed and shredded  Prescription sent to patient pharmacy and reviewed with patient.

## 2018-03-21 NOTE — LETTER
[address] [place] [address] [facility] 
[address] [facility] [id number] [phone] Work/School Note Date: 3/21/2018 To Whom It May concern: 
 
Julia Guerra was seen and treated today in the emergency room by the following provider(s): 
Attending Provider: Bernabe Lanes, MD.   
 
Julia Guerra may return to work on 3/24/18 Britton Somers Sincerely, 
 
 
 
 
James Herman RN

## 2018-03-23 LAB
B-HEM STREP THROAT QL CULT: NEGATIVE
BACTERIA SPEC CULT: NORMAL
SERVICE CMNT-IMP: NORMAL

## 2018-03-27 ENCOUNTER — OFFICE VISIT (OUTPATIENT)
Dept: FAMILY MEDICINE CLINIC | Age: 53
End: 2018-03-27

## 2018-03-27 VITALS
RESPIRATION RATE: 18 BRPM | WEIGHT: 180.4 LBS | DIASTOLIC BLOOD PRESSURE: 70 MMHG | OXYGEN SATURATION: 99 % | BODY MASS INDEX: 28.99 KG/M2 | HEART RATE: 106 BPM | SYSTOLIC BLOOD PRESSURE: 128 MMHG | TEMPERATURE: 97.6 F | HEIGHT: 66 IN

## 2018-03-27 DIAGNOSIS — R05.9 COUGH: Primary | ICD-10-CM

## 2018-03-27 DIAGNOSIS — Z87.09 HISTORY OF INFLUENZA: ICD-10-CM

## 2018-03-27 NOTE — PROGRESS NOTES
Chief Complaint   Patient presents with    Flu     hospital F/U     Patient is here today for Hospital F/U 3/14/18 due to Flu type B. Coughing still. Pt sts she has order for colon screening. 1. Have you been to the ER, urgent care clinic since your last visit? Hospitalized since your last visit? Yes 3/21/18 due to Flu type B    2. Have you seen or consulted any other health care providers outside of the [facility] since your last visit? Include any pap smears or colon screening.  No

## 2018-03-27 NOTE — MR AVS SNAPSHOT
[address] [facility] 
 
 
 [address] [place] [address] [address] [address] 
[phone] Patient: Yisel Smith MRN: [id number] :1965 Visit Information Date & Time Provider Department Dept. Phone Encounter #  
 3/27/2018 10:00 AM Charo Hathaway, [address] [phone] [id number] Follow-up Instructions Return in about 2 weeks (around 4/10/2018), or if symptoms worsen or fail to improve, for ONLY if not improved . Your Appointments 2018  3:20 PM  
Office Visit with MD Guillermina Encinas  Primary Care Donnell Michel) Appt Note: fu meds [address] [facility] [address] [facility] [address]  
[phone]  
  
   
 [address],  [address] [place] [address] Upcoming Health Maintenance Date Due DTaP/Tdap/Td series (1 - Tdap) 1988 FOBT Q 1 YEAR AGE 50-75 2017 PAP AKA CERVICAL CYTOLOGY 2018 BREAST CANCER SCRN MAMMOGRAM 2018 Allergies as of 3/27/2018  Review Complete On: 3/27/2018 By: Xin Fierro LPN No Known Allergies Current Immunizations  Never Reviewed No immunizations on file. Not reviewed this visit You Were Diagnosed With   
  
 Codes Comments Cough    -  Primary ICD-10-CM: U78 ICD-9-CM: 786.2 History of influenza     ICD-10-CM: Z87.09 
ICD-9-CM: V12.09 Vitals BP Pulse Temp Resp Height(growth percentile) Weight(growth percentile) 128/70 (BP 1 Location: Right arm, BP Patient Position: Sitting) (!) 106 97.6 °F (36.4 °C) (Oral) 18 5' 6\" (1.676 m) 180 lb 6.4 oz (81.8 kg) SpO2 BMI OB Status Smoking Status 99% 29.12 kg/m2 Injection Never Smoker Vitals History BMI and BSA Data Body Mass Index Body Surface Area  
 29.12 kg/m 2 1.95 m 2 Preferred Pharmacy Pharmacy Name Phone [facility] [address] [facility] [address], [facility] [phone] Your Updated Medication List  
  
   
This list is accurate as of 3/27/18 11:06 AM.  Always use your most recent med list.  
  
  
  
  
 DEPO-PROVERA 150 mg/mL Syrg Generic drug:  medroxyPROGESTERone 150 mg by IntraMUSCular route once. guaiFENesin-dextromethorphan -30 mg per tablet Commonly known as:  Will & Will DM Take 1 Tab by mouth every twelve (12) hours as needed for Cough. MULTI-VITAMIN PO Take  by mouth. simvastatin 40 mg tablet Commonly known as:  ZOCOR  
take 1 tablet by mouth every evening Prescriptions Sent to Pharmacy Refills  
 guaiFENesin-dextromethorphan SR (MUCINEX DM) 600-30 mg per tablet 0 Sig: Take 1 Tab by mouth every twelve (12) hours as needed for Cough. Class: Normal  
 Pharmacy: [facility] [id number] [address] #: [phone] Route: Oral  
  
Follow-up Instructions Return in about 2 weeks (around 4/10/2018), or if symptoms worsen or fail to improve, for ONLY if not improved . Patient Instructions Cough: Care Instructions Your Care Instructions A cough is your body's response to something that bothers your throat or airways. Many things can cause a cough. You might cough because of a cold or the flu, bronchitis, or asthma. Smoking, postnasal drip, allergies, and stomach acid that backs up into your throat also can cause coughs. A cough is a symptom, not a disease. Most coughs stop when the cause, such as a cold, goes away. You can take a few steps at home to cough less and feel better. Follow-up care is a key part of your treatment and safety. Be sure to make and go to all appointments, and call your doctor if you are having problems. It's also a good idea to know your test results and keep a list of the medicines you take. How can you care for yourself at home? · Drink lots of water and other fluids.  This helps thin the mucus and soothes a dry or sore throat. Honey or lemon juice in hot water or tea may ease a dry cough. · Take cough medicine as directed by your doctor. · Prop up your head on pillows to help you breathe and ease a dry cough. · Try cough drops to soothe a dry or sore throat. Cough drops don't stop a cough. Medicine-flavored cough drops are no better than candy-flavored drops or hard candy. · Do not smoke. Avoid secondhand smoke. If you need help quitting, talk to your doctor about stop-smoking programs and medicines. These can increase your chances of quitting for good. When should you call for help? Call 911 anytime you think you may need emergency care. For example, call if: 
? · You have severe trouble breathing. ?Call your doctor now or seek immediate medical care if: 
? · You cough up blood. ? · You have new or worse trouble breathing. ? · You have a new or higher fever. ? · You have a new rash. ? Watch closely for changes in your health, and be sure to contact your doctor if: 
? · You cough more deeply or more often, especially if you notice more mucus or a change in the color of your mucus. ? · You have new symptoms, such as a sore throat, an earache, or sinus pain. ? · You do not get better as expected. Where can you learn more? Go to http://jose guadalupe-juan josé.info/. Enter D279 in the search box to learn more about \"Cough: Care Instructions. \" Current as of: May 12, 2017 Content Version: 11.4 © 1383-7648 Drill Cycle. Care instructions adapted under license by SportStream (which disclaims liability or warranty for this information). If you have questions about a medical condition or this instruction, always ask your healthcare professional. [address] any warranty or liability for your use of this information. Introducing [facility] & HEALTH SERVICES! Dear Desi Bucio: Thank you for requesting a LiveClips account.   Our records indicate that you already have an active Poolami account. You can access your account anytime at [email or web address] Did you know that you can access your hospital and ER discharge instructions at any time in Poolami? You can also review all of your test results from your hospital stay or ER visit. Additional Information If you have questions, please visit the Frequently Asked Questions section of the Poolami website at [email or web address] Remember, Poolami is NOT to be used for urgent needs. For medical emergencies, dial 911. Now available from your iPhone and Android! Please provide this summary of care documentation to your next provider. Your primary care clinician is listed as [address]. If you have any questions after today's visit, please call [phone].

## 2018-03-27 NOTE — LETTER
NOTIFICATION RETURN TO WORK / SCHOOL 
 
3/27/2018 11:08 AM 
 
Ms. Demond Downey [address] [address] To Whom It May Concern: 
 
Demond Downey is currently under the care of Maikol Francois Dr. She will return to work/school on: 3/28/18 If there are questions or concerns please have the patient contact our office.  
 
 
 
Sincerely, 
 
 
Melany Padgett NP

## 2018-03-27 NOTE — PROGRESS NOTES
HISTORY OF PRESENT ILLNESS  Magalie Almaraz is a 50 y.o. female. Flu    The history is provided by the patient. This is a new problem. Episode onset: 1 week ago, treated in the ED with Tamiflu. The problem has been gradually improving. Maximum temperature: none. Associated symptoms include headaches (mild frontal ) and cough. Pertinent negatives include no chest pain, no congestion, no sore throat, no muscle aches and no shortness of breath. She has tried nothing (for residual cough, feels like there is sputum she needs to bring up) for the symptoms. No Known Allergies  Current Outpatient Prescriptions   Medication Sig Dispense Refill    simvastatin (ZOCOR) 40 mg tablet take 1 tablet by mouth every evening 30 Tab 6    medroxyprogesterone (DEPO-PROVERA) 150 mg/mL Syrg 150 mg by IntraMUSCular route once.  MULTIVITAMINS (MULTI-VITAMIN PO) Take  by mouth. Past Medical History:   Diagnosis Date    Facet arthropathy     Heart murmur 2006    High cholesterol     Iritis     Low back pain     Nausea     Spinal stenosis      Review of Systems   Constitutional: Negative for chills and fever. HENT: Negative for congestion, ear pain and sore throat. Respiratory: Positive for cough. Negative for sputum production and shortness of breath. Cardiovascular: Negative for chest pain. Neurological: Positive for headaches (mild frontal ). Visit Vitals    /70 (BP 1 Location: Right arm, BP Patient Position: Sitting)    Pulse (!) 106    Temp 97.6 °F (36.4 °C) (Oral)    Resp 18    Ht 5' 6\" (1.676 m)    Wt 180 lb 6.4 oz (81.8 kg)    SpO2 99%    BMI 29.12 kg/m2     Physical Exam   Constitutional: She appears well-developed and well-nourished. No distress. HENT:   Head: Normocephalic and atraumatic. Right Ear: Tympanic membrane is not erythematous and not retracted. No middle ear effusion. Left Ear: Tympanic membrane is not erythematous and not retracted. No middle ear effusion. Nose: Mucosal edema (turbinates pale, boggy with clear secretions, bleeding point to left medial nare ) present. Right sinus exhibits maxillary sinus tenderness (mild) and frontal sinus tenderness (mild). Left sinus exhibits maxillary sinus tenderness (mild) and frontal sinus tenderness (mild). Mouth/Throat: Uvula is midline, oropharynx is clear and moist and mucous membranes are normal.   Neck: Normal range of motion. Neck supple. Carotid bruit is not present. Cardiovascular: Normal rate, regular rhythm, S1 normal, S2 normal and normal pulses. Exam reveals no gallop and no friction rub. Murmur heard. Systolic murmur is present with a grade of 1/6   Pulmonary/Chest: Effort normal and breath sounds normal. She has no wheezes. She has no rales. ASSESSMENT and PLAN  Diagnoses and all orders for this visit:    1. Cough  -     guaiFENesin-dextromethorphan SR (MUCINEX DM) 600-30 mg per tablet; Take 1 Tab by mouth every twelve (12) hours as needed for Cough. 2. History of influenza    continue current symptomatic treatment  Instructed to use Maylin Pott for nasal congestion  reviewed diet, exercise and weight control  Work note provided as per request  I have discussed the diagnosis with the patient and the intended plan as seen in the above orders. The patient has received an after-visit summary and questions were answered concerning future plans. I have discussed medication side effects and warnings with the patient as well. Patient agreeable with above plan and verbalizes understanding. Follow-up Disposition:  Return in about 2 weeks (around 4/10/2018), or if symptoms worsen or fail to improve, for ONLY if not improved .

## 2018-03-27 NOTE — PATIENT INSTRUCTIONS

## 2018-06-14 ENCOUNTER — OFFICE VISIT (OUTPATIENT)
Dept: FAMILY MEDICINE CLINIC | Age: 53
End: 2018-06-14

## 2018-06-14 VITALS
TEMPERATURE: 98.2 F | OXYGEN SATURATION: 97 % | RESPIRATION RATE: 16 BRPM | HEIGHT: 66 IN | DIASTOLIC BLOOD PRESSURE: 90 MMHG | HEART RATE: 100 BPM | SYSTOLIC BLOOD PRESSURE: 148 MMHG | BODY MASS INDEX: 29.73 KG/M2 | WEIGHT: 185 LBS

## 2018-06-14 DIAGNOSIS — R03.0 ELEVATED BP WITHOUT DIAGNOSIS OF HYPERTENSION: Primary | ICD-10-CM

## 2018-06-14 NOTE — PROGRESS NOTES
1. Have you been to the ER, urgent care clinic since your last visit? Hospitalized since your last visit? No    2. Have you seen or consulted any other health care providers outside of the [address] [place] [address] since your last visit? Include any pap smears or colon screening.  No

## 2018-06-14 NOTE — MR AVS SNAPSHOT
[facility] 
 
 
 [id number] [address] [address] 
[phone] Patient: Rosi Lamar MRN: [id number] :1965 Visit Information Date & Time Provider Department Dept. Phone Encounter #  
 2018  3:20 PM Sissy Piña, [address] [place] [address] [id number] Follow-up Instructions Return in about 3 months (around 2018) for chol/BP. Upcoming Health Maintenance Date Due DTaP/Tdap/Td series (1 - Tdap) 1988 FOBT Q 1 YEAR AGE 50-75 2017 PAP AKA CERVICAL CYTOLOGY 2018 Influenza Age 5 to Adult 2018 BREAST CANCER SCRN MAMMOGRAM 2018 Allergies as of 2018  Review Complete On: 2018 By: Sissy Piña MD  
 No Known Allergies Current Immunizations  Never Reviewed No immunizations on file. Not reviewed this visit You Were Diagnosed With   
  
 Codes Comments Elevated BP without diagnosis of hypertension    -  Primary ICD-10-CM: R03.0 ICD-9-CM: 796.2 Vitals BP Pulse Temp Resp Height(growth percentile) Weight(growth percentile) 148/90 100 98.2 °F (36.8 °C) (Oral) 16 5' 6\" (1.676 m) 185 lb (83.9 kg) SpO2 BMI OB Status Smoking Status 97% 29.86 kg/m2 Injection Never Smoker Vitals History BMI and BSA Data Body Mass Index Body Surface Area  
 29.86 kg/m 2 1.98 m 2 Preferred Pharmacy Pharmacy Name Phone [facility] [address] [facility], 9 [facility] [phone] Your Updated Medication List  
  
   
This list is accurate as of 18  3:55 PM.  Always use your most recent med list.  
  
  
  
  
 DEPO-PROVERA 150 mg/mL Syrg Generic drug:  medroxyPROGESTERone 150 mg by IntraMUSCular route once. MULTI-VITAMIN PO Take  by mouth. simvastatin 40 mg tablet Commonly known as:  ZOCOR  
take 1 tablet by mouth every evening Follow-up Instructions Return in about 3 months (around 9/14/2018) for chol/BP. Patient Instructions DASH Diet: Care Instructions Your Care Instructions The DASH diet is an eating plan that can help lower your blood pressure. DASH stands for Dietary Approaches to Stop Hypertension. Hypertension is high blood pressure. The DASH diet focuses on eating foods that are high in calcium, potassium, and magnesium. These nutrients can lower blood pressure. The foods that are highest in these nutrients are fruits, vegetables, low-fat dairy products, nuts, seeds, and legumes. But taking calcium, potassium, and magnesium supplements instead of eating foods that are high in those nutrients does not have the same effect. The DASH diet also includes whole grains, fish, and poultry. The DASH diet is one of several lifestyle changes your doctor may recommend to lower your high blood pressure. Your doctor may also want you to decrease the amount of sodium in your diet. Lowering sodium while following the DASH diet can lower blood pressure even further than just the DASH diet alone. Follow-up care is a key part of your treatment and safety. Be sure to make and go to all appointments, and call your doctor if you are having problems. It's also a good idea to know your test results and keep a list of the medicines you take. How can you care for yourself at home? Following the DASH diet · Eat 4 to 5 servings of fruit each day. A serving is 1 medium-sized piece of fruit, ½ cup chopped or canned fruit, 1/4 cup dried fruit, or 4 ounces (½ cup) of fruit juice. Choose fruit more often than fruit juice. · Eat 4 to 5 servings of vegetables each day. A serving is 1 cup of lettuce or raw leafy vegetables, ½ cup of chopped or cooked vegetables, or 4 ounces (½ cup) of vegetable juice. Choose vegetables more often than vegetable juice. · Get 2 to 3 servings of low-fat and fat-free dairy each day.  A serving is 8 ounces of milk, 1 cup of yogurt, or 1 ½ ounces of cheese. · Eat 6 to 8 servings of grains each day. A serving is 1 slice of bread, 1 ounce of dry cereal, or ½ cup of cooked rice, pasta, or cooked cereal. Try to choose whole-grain products as much as possible. · Limit lean meat, poultry, and fish to 2 servings each day. A serving is 3 ounces, about the size of a deck of cards. · Eat 4 to 5 servings of nuts, seeds, and legumes (cooked dried beans, lentils, and split peas) each week. A serving is 1/3 cup of nuts, 2 tablespoons of seeds, or ½ cup of cooked beans or peas. · Limit fats and oils to 2 to 3 servings each day. A serving is 1 teaspoon of vegetable oil or 2 tablespoons of salad dressing. · Limit sweets and added sugars to 5 servings or less a week. A serving is 1 tablespoon jelly or jam, ½ cup sorbet, or 1 cup of lemonade. · Eat less than 2,300 milligrams (mg) of sodium a day. If you limit your sodium to 1,500 mg a day, you can lower your blood pressure even more. Tips for success · Start small. Do not try to make dramatic changes to your diet all at once. You might feel that you are missing out on your favorite foods and then be more likely to not follow the plan. Make small changes, and stick with them. Once those changes become habit, add a few more changes. · Try some of the following: ¨ Make it a goal to eat a fruit or vegetable at every meal and at snacks. This will make it easy to get the recommended amount of fruits and vegetables each day. ¨ Try yogurt topped with fruit and nuts for a snack or healthy dessert. ¨ Add lettuce, tomato, cucumber, and onion to sandwiches. ¨ Combine a ready-made pizza crust with low-fat mozzarella cheese and lots of vegetable toppings. Try using tomatoes, squash, spinach, broccoli, carrots, cauliflower, and onions. ¨ Have a variety of cut-up vegetables with a low-fat dip as an appetizer instead of chips and dip. ¨ Sprinkle sunflower seeds or chopped almonds over salads. Or try adding chopped walnuts or almonds to cooked vegetables. ¨ Try some vegetarian meals using beans and peas. Add garbanzo or kidney beans to salads. Make burritos and tacos with mashed trinidad beans or black beans. Where can you learn more? Go to http://jose guadalupe-juan josé.info/. Enter M277 in the search box to learn more about \"DASH Diet: Care Instructions. \" Current as of: September 21, 2016 Content Version: 11.4 © 7563-4787 Vision Internet. Care instructions adapted under license by Adify (which disclaims liability or warranty for this information). If you have questions about a medical condition or this instruction, always ask your healthcare professional. [address] any warranty or liability for your use of this information. Low Sodium Diet (2,000 Milligram): Care Instructions Your Care Instructions Too much sodium causes your body to hold on to extra water. This can raise your blood pressure and force your heart and kidneys to work harder. In very serious cases, this could cause you to be put in the hospital. It might even be life-threatening. By limiting sodium, you will feel better and lower your risk of serious problems. The most common source of sodium is salt. People get most of the salt in their diet from canned, prepared, and packaged foods. Fast food and restaurant meals also are very high in sodium. Your doctor will probably limit your sodium to less than 2,000 milligrams (mg) a day. This limit counts all the sodium in prepared and packaged foods and any salt you add to your food. Follow-up care is a key part of your treatment and safety. Be sure to make and go to all appointments, and call your doctor if you are having problems. It's also a good idea to know your test results and keep a list of the medicines you take. How can you care for yourself at home? Read food labels · Read labels on cans and food packages. The labels tell you how much sodium is in each serving. Make sure that you look at the serving size. If you eat more than the serving size, you have eaten more sodium. · Food labels also tell you the Percent Daily Value for sodium. Choose products with low Percent Daily Values for sodium. · Be aware that sodium can come in forms other than salt, including monosodium glutamate (MSG), sodium citrate, and sodium bicarbonate (baking soda). MSG is often added to Asian food. When you eat out, you can sometimes ask for food without MSG or added salt. Buy low-sodium foods · Buy foods that are labeled \"unsalted\" (no salt added), \"sodium-free\" (less than 5 mg of sodium per serving), or \"low-sodium\" (less than 140 mg of sodium per serving). Foods labeled \"reduced-sodium\" and \"light sodium\" may still have too much sodium. Be sure to read the label to see how much sodium you are getting. · Buy fresh vegetables, or frozen vegetables without added sauces. Buy low-sodium versions of canned vegetables, soups, and other canned goods. Prepare low-sodium meals · Cut back on the amount of salt you use in cooking. This will help you adjust to the taste. Do not add salt after cooking. One teaspoon of salt has about 2,300 mg of sodium. · Take the salt shaker off the table. · Flavor your food with garlic, lemon juice, onion, vinegar, herbs, and spices. Do not use soy sauce, lite soy sauce, steak sauce, onion salt, garlic salt, celery salt, mustard, or ketchup on your food. · Use low-sodium salad dressings, sauces, and ketchup. Or make your own salad dressings and sauces without adding salt. · Use less salt (or none) when recipes call for it. You can often use half the salt a recipe calls for without losing flavor. Other foods such as rice, pasta, and grains do not need added salt. · Rinse canned vegetables, and cook them in fresh water.  This removes some-but not all-of the salt. · Avoid water that is naturally high in sodium or that has been treated with water softeners, which add sodium. Call your local water company to find out the sodium content of your water supply. If you buy bottled water, read the label and choose a sodium-free brand. Avoid high-sodium foods · Avoid eating: ¨ Smoked, cured, salted, and canned meat, fish, and poultry. ¨ Ham, norton, hot dogs, and luncheon meats. ¨ Regular, hard, and processed cheese and regular peanut butter. ¨ Crackers with salted tops, and other salted snack foods such as pretzels, chips, and salted popcorn. ¨ Frozen prepared meals, unless labeled low-sodium. ¨ Canned and dried soups, broths, and bouillon, unless labeled sodium-free or low-sodium. ¨ Canned vegetables, unless labeled sodium-free or low-sodium. ¨ [place] fries, pizza, tacos, and other fast foods. ¨ Pickles, olives, ketchup, and other condiments, especially soy sauce, unless labeled sodium-free or low-sodium. Where can you learn more? Go to http://jose guadalupe-juan josé.info/. Enter H699 in the search box to learn more about \"Low Sodium Diet (2,000 Milligram): Care Instructions. \" Current as of: May 12, 2017 Content Version: 11.4 © 5866-1055 iexerci.se. Care instructions adapted under license by Ar[employer]urus [employer]rapeutics [employer]. (which disclaims liability or warranty for this information). If you have questions about a medical condition or this instruction, always ask your healthcare professional. [place] [address] any warranty or liability for your use of this information. Introducing [facility] & HEALTH SERVICES! Dear Corey Boone: Thank you for requesting a ISN Solutions account. Our records indicate that you already have an active ISN Solutions account. You can access your account anytime at [email or web address] Did you know that you can access your hospital and ER discharge instructions at any time in Summitour? You can also review all of your test results from your hospital stay or ER visit. Additional Information If you have questions, please visit the Frequently Asked Questions section of the Summitour website at [email or web address] Remember, Summitour is NOT to be used for urgent needs. For medical emergencies, dial 911. Now available from your iPhone and Android! Please provide this summary of care documentation to your next provider. Your primary care clinician is listed as [address]. If you have any questions after today's visit, please call [phone].

## 2018-06-14 NOTE — PATIENT INSTRUCTIONS
DASH Diet: Care Instructions  Your Care Instructions    The DASH diet is an eating plan that can help lower your blood pressure. DASH stands for Dietary Approaches to Stop Hypertension. Hypertension is high blood pressure. The DASH diet focuses on eating foods that are high in calcium, potassium, and magnesium. These nutrients can lower blood pressure. The foods that are highest in these nutrients are fruits, vegetables, low-fat dairy products, nuts, seeds, and legumes. But taking calcium, potassium, and magnesium supplements instead of eating foods that are high in those nutrients does not have the same effect. The DASH diet also includes whole grains, fish, and poultry. The DASH diet is one of several lifestyle changes your doctor may recommend to lower your high blood pressure. Your doctor may also want you to decrease the amount of sodium in your diet. Lowering sodium while following the DASH diet can lower blood pressure even further than just the DASH diet alone. Follow-up care is a key part of your treatment and safety. Be sure to make and go to all appointments, and call your doctor if you are having problems. It's also a good idea to know your test results and keep a list of the medicines you take. How can you care for yourself at home? Following the DASH diet  · Eat 4 to 5 servings of fruit each day. A serving is 1 medium-sized piece of fruit, ½ cup chopped or canned fruit, 1/4 cup dried fruit, or 4 ounces (½ cup) of fruit juice. Choose fruit more often than fruit juice. · Eat 4 to 5 servings of vegetables each day. A serving is 1 cup of lettuce or raw leafy vegetables, ½ cup of chopped or cooked vegetables, or 4 ounces (½ cup) of vegetable juice. Choose vegetables more often than vegetable juice. · Get 2 to 3 servings of low-fat and fat-free dairy each day. A serving is 8 ounces of milk, 1 cup of yogurt, or 1 ½ ounces of cheese. · Eat 6 to 8 servings of grains each day.  A serving is 1 slice of bread, 1 ounce of dry cereal, or ½ cup of cooked rice, pasta, or cooked cereal. Try to choose whole-grain products as much as possible. · Limit lean meat, poultry, and fish to 2 servings each day. A serving is 3 ounces, about the size of a deck of cards. · Eat 4 to 5 servings of nuts, seeds, and legumes (cooked dried beans, lentils, and split peas) each week. A serving is 1/3 cup of nuts, 2 tablespoons of seeds, or ½ cup of cooked beans or peas. · Limit fats and oils to 2 to 3 servings each day. A serving is 1 teaspoon of vegetable oil or 2 tablespoons of salad dressing. · Limit sweets and added sugars to 5 servings or less a week. A serving is 1 tablespoon jelly or jam, ½ cup sorbet, or 1 cup of lemonade. · Eat less than 2,300 milligrams (mg) of sodium a day. If you limit your sodium to 1,500 mg a day, you can lower your blood pressure even more. Tips for success  · Start small. Do not try to make dramatic changes to your diet all at once. You might feel that you are missing out on your favorite foods and then be more likely to not follow the plan. Make small changes, and stick with them. Once those changes become habit, add a few more changes. · Try some of the following:  ¨ Make it a goal to eat a fruit or vegetable at every meal and at snacks. This will make it easy to get the recommended amount of fruits and vegetables each day. ¨ Try yogurt topped with fruit and nuts for a snack or healthy dessert. ¨ Add lettuce, tomato, cucumber, and onion to sandwiches. ¨ Combine a ready-made pizza crust with low-fat mozzarella cheese and lots of vegetable toppings. Try using tomatoes, squash, spinach, broccoli, carrots, cauliflower, and onions. ¨ Have a variety of cut-up vegetables with a low-fat dip as an appetizer instead of chips and dip. ¨ Sprinkle sunflower seeds or chopped almonds over salads. Or try adding chopped walnuts or almonds to cooked vegetables.   ¨ Try some vegetarian meals using beans and peas. Add garbanzo or kidney beans to salads. Make burritos and tacos with mashed trinidad beans or black beans. Where can you learn more? Go to http://jose guadalupe-juan josé.info/. Enter R668 in the search box to learn more about \"DASH Diet: Care Instructions. \"  Current as of: September 21, 2016  Content Version: 11.4  © 0362-7057 [employer]. Care instructions adapted under license by [employer] (which disclaims liability or warranty for this information). If you have questions about a medical condition or this instruction, always ask your healthcare professional. [address] any warranty or liability for your use of this information. Low Sodium Diet (2,000 Milligram): Care Instructions  Your Care Instructions    Too much sodium causes your body to hold on to extra water. This can raise your blood pressure and force your heart and kidneys to work harder. In very serious cases, this could cause you to be put in the hospital. It might even be life-threatening. By limiting sodium, you will feel better and lower your risk of serious problems. The most common source of sodium is salt. People get most of the salt in their diet from canned, prepared, and packaged foods. Fast food and restaurant meals also are very high in sodium. Your doctor will probably limit your sodium to less than 2,000 milligrams (mg) a day. This limit counts all the sodium in prepared and packaged foods and any salt you add to your food. Follow-up care is a key part of your treatment and safety. Be sure to make and go to all appointments, and call your doctor if you are having problems. It's also a good idea to know your test results and keep a list of the medicines you take. How can you care for yourself at home? Read food labels  · Read labels on cans and food packages. The labels tell you how much sodium is in each serving. Make sure that you look at the serving size.  If you eat more than the serving size, you have eaten more sodium. · Food labels also tell you the Percent Daily Value for sodium. Choose products with low Percent Daily Values for sodium. · Be aware that sodium can come in forms other than salt, including monosodium glutamate (MSG), sodium citrate, and sodium bicarbonate (baking soda). MSG is often added to Asian food. When you eat out, you can sometimes ask for food without MSG or added salt. Buy low-sodium foods  · Buy foods that are labeled \"unsalted\" (no salt added), \"sodium-free\" (less than 5 mg of sodium per serving), or \"low-sodium\" (less than 140 mg of sodium per serving). Foods labeled \"reduced-sodium\" and \"light sodium\" may still have too much sodium. Be sure to read the label to see how much sodium you are getting. · Buy fresh vegetables, or frozen vegetables without added sauces. Buy low-sodium versions of canned vegetables, soups, and other canned goods. Prepare low-sodium meals  · Cut back on the amount of salt you use in cooking. This will help you adjust to the taste. Do not add salt after cooking. One teaspoon of salt has about 2,300 mg of sodium. · Take the salt shaker off the table. · Flavor your food with garlic, lemon juice, onion, vinegar, herbs, and spices. Do not use soy sauce, lite soy sauce, steak sauce, onion salt, garlic salt, celery salt, mustard, or ketchup on your food. · Use low-sodium salad dressings, sauces, and ketchup. Or make your own salad dressings and sauces without adding salt. · Use less salt (or none) when recipes call for it. You can often use half the salt a recipe calls for without losing flavor. Other foods such as rice, pasta, and grains do not need added salt. · Rinse canned vegetables, and cook them in fresh water. This removes some-but not all-of the salt. · Avoid water that is naturally high in sodium or that has been treated with water softeners, which add sodium.  Call your local water company to find out the sodium content of your water supply. If you buy bottled water, read the label and choose a sodium-free brand. Avoid high-sodium foods  · Avoid eating:  ¨ Smoked, cured, salted, and canned meat, fish, and poultry. ¨ Ham, norton, hot dogs, and luncheon meats. ¨ Regular, hard, and processed cheese and regular peanut butter. ¨ Crackers with salted tops, and other salted snack foods such as pretzels, chips, and salted popcorn. ¨ Frozen prepared meals, unless labeled low-sodium. ¨ Canned and dried soups, broths, and bouillon, unless labeled sodium-free or low-sodium. ¨ Canned vegetables, unless labeled sodium-free or low-sodium. ¨ [place] fries, pizza, tacos, and other fast foods. ¨ Pickles, olives, ketchup, and other condiments, especially soy sauce, unless labeled sodium-free or low-sodium. Where can you learn more? Go to http://jose guadalupe-juan josé.info/. Enter V392 in the search box to learn more about \"Low Sodium Diet (2,000 Milligram): Care Instructions. \"  Current as of: May 12, 2017  Content Version: 11.4  © 9991-3347 [employer]. Care instructions adapted under license by [employer] (which disclaims liability or warranty for this information). If you have questions about a medical condition or this instruction, always ask your healthcare professional. [place] [address] any warranty or liability for your use of this information.

## 2018-06-14 NOTE — PROGRESS NOTES
HISTORY OF PRESENT ILLNESS  Donita Barakat is a 48 y.o. female. HPI  Patient is here today for evaluation and treatment of: Blood Pressure Check     Blood Pressure: on no meds for BP at this time; There is a FH HTN. She feels well; She has no new complaints. Does report some salty meals ( frozen food) at times. Wt Readings from Last 3 Encounters:   06/14/18 185 lb (83.9 kg)   03/27/18 180 lb 6.4 oz (81.8 kg)   03/21/18 180 lb (81.6 kg)         BP Readings from Last 3 Encounters:   06/14/18 148/83   03/27/18 128/70   03/21/18 156/73           Current Outpatient Prescriptions:     simvastatin (ZOCOR) 40 mg tablet, take 1 tablet by mouth every evening, Disp: 30 Tab, Rfl: 6    medroxyprogesterone (DEPO-PROVERA) 150 mg/mL Syrg, 150 mg by IntraMUSCular route once., Disp: , Rfl:     MULTIVITAMINS (MULTI-VITAMIN PO), Take  by mouth., Disp: , Rfl:       Review of Systems   Constitutional: Negative for chills and fever. Respiratory: Negative for shortness of breath and wheezing. Cardiovascular: Negative for chest pain and palpitations. Physical Exam   Visit Vitals    /90    Pulse 100    Temp 98.2 °F (36.8 °C) (Oral)    Resp 16    Ht 5' 6\" (1.676 m)    Wt 185 lb (83.9 kg)    SpO2 97%    BMI 29.86 kg/m2     General appearance: alert, cooperative, no distress, appears stated age  Neck: supple, symmetrical, trachea midline, no adenopathy, thyroid: not enlarged, symmetric, no tenderness/mass/nodules, no carotid bruit and no JVD  Lungs: clear to auscultation bilaterally  Heart: regular rate and rhythm, S1, S2 normal, no murmur, click, rub or gallop  Abdomen: soft, non-tender.  Bowel sounds normal. No masses,  no organomegaly  Extremities: extremities normal, atraumatic, no cyanosis or edema        Lab Results   Component Value Date/Time    Cholesterol, total 169 09/14/2017 08:53 AM    HDL Cholesterol 46 09/14/2017 08:53 AM    LDL, calculated 113 (H) 09/14/2017 08:53 AM    VLDL, calculated 10 09/14/2017 08:53 AM    Triglyceride 50 09/14/2017 08:53 AM    CHOL/HDL Ratio 3.7 09/14/2017 08:53 AM         ASSESSMENT and PLAN    ICD-10-CM ICD-9-CM    1. Elevated BP without diagnosis of hypertension R03.0 796.2      As above, .elevated  DASH diet  Low sodium diet  Exercise as tolerated  Follow-up Disposition:  Return in about 3 months (around 9/14/2018) for chol/BP. An After Visit Summary was printed and given to the patient. This has been fully explained to the patient, who indicates understanding.

## 2018-06-16 ENCOUNTER — HOSPITAL ENCOUNTER (OUTPATIENT)
Dept: LAB | Age: 53
Discharge: HOME | End: 2018-06-16
Payer: OTHER GOVERNMENT

## 2018-06-16 DIAGNOSIS — E78.00 HYPERCHOLESTEREMIA: ICD-10-CM

## 2018-06-16 DIAGNOSIS — R03.0 ELEVATED BP WITHOUT DIAGNOSIS OF HYPERTENSION: ICD-10-CM

## 2018-06-16 LAB
ALT SERPL-CCNC: 22 U/L (ref 13–56)
AST SERPL-CCNC: 17 U/L (ref 15–37)
CHOLEST SERPL-MCNC: 168 MG/DL
HDLC SERPL-MCNC: 39 MG/DL (ref 40–60)
HDLC SERPL: 4.3 {RATIO} (ref 0–5)
LDLC SERPL CALC-MCNC: 120 MG/DL (ref 0–100)
LIPID PROFILE,FLP: ABNORMAL
TRIGL SERPL-MCNC: 45 MG/DL (ref ?–150)
TSH SERPL DL<=0.05 MIU/L-ACNC: 0.54 UIU/ML (ref 0.36–3.74)
VLDLC SERPL CALC-MCNC: 9 MG/DL

## 2018-06-16 PROCEDURE — 84460 ALANINE AMINO (ALT) (SGPT): CPT | Performed by: FAMILY MEDICINE

## 2018-06-16 PROCEDURE — 36415 COLL VENOUS BLD VENIPUNCTURE: CPT | Performed by: FAMILY MEDICINE

## 2018-06-16 PROCEDURE — 84443 ASSAY THYROID STIM HORMONE: CPT | Performed by: FAMILY MEDICINE

## 2018-06-16 PROCEDURE — 84450 TRANSFERASE (AST) (SGOT): CPT | Performed by: FAMILY MEDICINE

## 2018-06-16 PROCEDURE — 80061 LIPID PANEL: CPT | Performed by: FAMILY MEDICINE

## 2018-09-17 ENCOUNTER — OFFICE VISIT (OUTPATIENT)
Dept: FAMILY MEDICINE CLINIC | Age: 53
End: 2018-09-17

## 2018-09-17 VITALS
BODY MASS INDEX: 29.25 KG/M2 | RESPIRATION RATE: 18 BRPM | DIASTOLIC BLOOD PRESSURE: 80 MMHG | TEMPERATURE: 98.2 F | OXYGEN SATURATION: 98 % | HEART RATE: 90 BPM | SYSTOLIC BLOOD PRESSURE: 138 MMHG | WEIGHT: 182 LBS | HEIGHT: 66 IN

## 2018-09-17 DIAGNOSIS — Z12.11 SCREENING FOR COLON CANCER: ICD-10-CM

## 2018-09-17 DIAGNOSIS — E78.00 HYPERCHOLESTEREMIA: ICD-10-CM

## 2018-09-17 DIAGNOSIS — Z23 ENCOUNTER FOR IMMUNIZATION: ICD-10-CM

## 2018-09-17 DIAGNOSIS — R03.0 ELEVATED BP WITHOUT DIAGNOSIS OF HYPERTENSION: Primary | ICD-10-CM

## 2018-09-17 NOTE — MR AVS SNAPSHOT
[facility] 
 
 
 [address] [address] [address] [address] 
[phone] Patient: Dearl Members MRN: [id number] :1965 Visit Information Date & Time Provider Department Dept. Phone Encounter #  
 2018  8:20 AM Salena Green [address] [place] [address] [phone] [id number] Follow-up Instructions Return in about 3 months (around 2018) for well exam. Upcoming Health Maintenance Date Due COLONOSCOPY 1985 DTaP/Tdap/Td series (1 - Tdap) 1988 Influenza Age 5 to Adult 2018 PAP AKA CERVICAL CYTOLOGY 2018 BREAST CANCER SCRN MAMMOGRAM 2018 Allergies as of 2018  Review Complete On: 2018 By: Salena Green MD  
 No Known Allergies Current Immunizations  Never Reviewed Name Date Influenza Vaccine (Quad) PF 2018 Not reviewed this visit You Were Diagnosed With   
  
 Codes Comments Elevated BP without diagnosis of hypertension    -  Primary ICD-10-CM: R03.0 ICD-9-CM: 796.2 Hypercholesteremia     ICD-10-CM: E78.00 ICD-9-CM: 272.0 Encounter for immunization     ICD-10-CM: F39 ICD-9-CM: V03.89 Screening for colon cancer     ICD-10-CM: Z12.11 ICD-9-CM: V76.51 Vitals BP Pulse Temp Resp Height(growth percentile) Weight(growth percentile) 138/80 90 98.2 °F (36.8 °C) (Oral) 18 5' 6\" (1.676 m) 182 lb (82.6 kg) SpO2 BMI OB Status Smoking Status 98% 29.38 kg/m2 Injection Never Smoker Vitals History BMI and BSA Data Body Mass Index Body Surface Area  
 29.38 kg/m 2 1.96 m 2 Preferred Pharmacy Pharmacy Name Phone [facility] [address] [facility], [facility] [phone] Your Updated Medication List  
  
   
This list is accurate as of 18  8:43 AM.  Always use your most recent med list.  
  
  
  
  
 DEPO-PROVERA 150 mg/mL Syrg Generic drug:  medroxyPROGESTERone 150 mg by IntraMUSCular route once. MULTI-VITAMIN PO Take  by mouth. simvastatin 40 mg tablet Commonly known as:  ZOCOR  
take 1 tablet by mouth every evening We Performed the Following INFLUENZA VIRUS VAC QUAD,SPLIT,PRESV FREE SYRINGE IM [id number] CPT(R)] REFERRAL TO GASTROENTEROLOGY [JKX75 Custom] Follow-up Instructions Return in about 3 months (around 12/17/2018) for well exam.  
  
  
Referral Information Referral ID Referred By Referred To  
  
 [id number] Lashanda Sullivan MD   
   [address] [place] [address] Drive [address] [facility] [address] [place], [address] Phone: [phone] Fax: [phone] Visits Status Start Date End Date 1 New Request 9/17/18 9/17/19 If your referral has a status of pending review or denied, additional information will be sent to support the outcome of this decision. Patient Instructions High Blood Pressure: Care Instructions Your Care Instructions If your blood pressure is usually above 130/80, you have high blood pressure, or hypertension. That means the top number is 130 or higher or the bottom number is 80 or higher, or both. Despite what a lot of people think, high blood pressure usually doesn't cause headaches or make you feel dizzy or lightheaded. It usually has no symptoms. But it does increase your risk for heart attack, stroke, and kidney or eye damage. The higher your blood pressure, the more your risk increases. Your doctor will give you a goal for your blood pressure. Your goal will be based on your health and your age. Lifestyle changes, such as eating healthy and being active, are always important to help lower blood pressure. You might also take medicine to reach your blood pressure goal. 
Follow-up care is a key part of your treatment and safety.  Be sure to make and go to all appointments, and call your doctor if you are having problems. It's also a good idea to know your test results and keep a list of the medicines you take. How can you care for yourself at home? Medical treatment · If you stop taking your medicine, your blood pressure will go back up. You may take one or more types of medicine to lower your blood pressure. Be safe with medicines. Take your medicine exactly as prescribed. Call your doctor if you think you are having a problem with your medicine. · Talk to your doctor before you start taking aspirin every day. Aspirin can help certain people lower their risk of a heart attack or stroke. But taking aspirin isn't right for everyone, because it can cause serious bleeding. · See your doctor regularly. You may need to see the doctor more often at first or until your blood pressure comes down. · If you are taking blood pressure medicine, talk to your doctor before you take decongestants or anti-inflammatory medicine, such as ibuprofen. Some of these medicines can raise blood pressure. · Learn how to check your blood pressure at home. Lifestyle changes · Stay at a healthy weight. This is especially important if you put on weight around the waist. Losing even 10 pounds can help you lower your blood pressure. · If your doctor recommends it, get more exercise. Walking is a good choice. Bit by bit, increase the amount you walk every day. Try for at least 30 minutes on most days of the week. You also may want to swim, bike, or do other activities. · Avoid or limit alcohol. Talk to your doctor about whether you can drink any alcohol. · Try to limit how much sodium you eat to less than 2,300 milligrams (mg) a day. Your doctor may ask you to try to eat less than 1,500 mg a day. · Eat plenty of fruits (such as bananas and oranges), vegetables, legumes, whole grains, and low-fat dairy products. · Lower the amount of saturated fat in your diet. Saturated fat is found in animal products such as milk, cheese, and meat. Limiting these foods may help you lose weight and also lower your risk for heart disease. · Do not smoke. Smoking increases your risk for heart attack and stroke. If you need help quitting, talk to your doctor about stop-smoking programs and medicines. These can increase your chances of quitting for good. When should you call for help? Call 911 anytime you think you may need emergency care. This may mean having symptoms that suggest that your blood pressure is causing a serious heart or blood vessel problem. Your blood pressure may be over 180/110. 
 For example, call 911 if: 
  · You have symptoms of a heart attack. These may include: ¨ Chest pain or pressure, or a strange feeling in the chest. 
¨ Sweating. ¨ Shortness of breath. ¨ Nausea or vomiting. ¨ Pain, pressure, or a strange feeling in the back, neck, jaw, or upper belly or in one or both shoulders or arms. ¨ Lightheadedness or sudden weakness. ¨ A fast or irregular heartbeat.  
  · You have symptoms of a stroke. These may include: 
¨ Sudden numbness, tingling, weakness, or loss of movement in your face, arm, or leg, especially on only one side of your body. ¨ Sudden vision changes. ¨ Sudden trouble speaking. ¨ Sudden confusion or trouble understanding simple statements. ¨ Sudden problems with walking or balance. ¨ A sudden, severe headache that is different from past headaches.  
  · You have severe back or belly pain.  
 Do not wait until your blood pressure comes down on its own. Get help right away. 
 Call your doctor now or seek immediate care if: 
  · Your blood pressure is much higher than normal (such as 180/110 or higher), but you don't have symptoms.  
  · You think high blood pressure is causing symptoms, such as: ¨ Severe headache. ¨ Blurry vision.  Watch closely for changes in your health, and be sure to contact your doctor if: 
  · Your blood pressure measures 140/90 or higher at least 2 times. That means the top number is 140 or higher or the bottom number is 90 or higher, or both.  
  · You think you may be having side effects from your blood pressure medicine.  
  · Your blood pressure is usually normal, but it goes above normal at least 2 times. Where can you learn more? Go to http://jose guadalupe-juan josé.info/. Enter I287 in the search box to learn more about \"High Blood Pressure: Care Instructions. \" Current as of: December 6, 2017 Content Version: 11.7 © 4927-6794 [employer]. Care instructions adapted under license by [employer] (which disclaims liability or warranty for this information). If you have questions about a medical condition or this instruction, always ask your healthcare professional. [address] any warranty or liability for your use of this information. Introducing [facility]! Dear Bee Quintero: Thank you for requesting a [employer] account. Our records indicate that you already have an active [employer] account. You can access your account anytime at [email or web address]. [employer]/Win Win Slots Did you know that you can access your hospital and ER discharge instructions at any time in [employer] You can also review all of your test results from your hospital stay or ER visit. Additional Information If you have questions, please visit the Frequently Asked Questions section of the [employer] website at [email or web address] Remember, [employer] is NOT to be used for urgent needs. For medical emergencies, dial 911. Now available from your iPhone and Android! Please provide this summary of care documentation to your next provider. Your primary care clinician is listed as [address].  If you have any questions after today's visit, please call [phone].

## 2018-09-17 NOTE — PATIENT INSTRUCTIONS

## 2018-09-17 NOTE — PROGRESS NOTES
1. Have you been to the ER, urgent care clinic since your last visit? Hospitalized since your last visit? no    2. Have you seen or consulted any other health care providers outside of the [address] [place] [address] since your last visit? Include any pap smears or colon screening. No medication refills needed at this time. Is fasting  Chief Complaint   Patient presents with    Cholesterol Problem    Elevated Blood Pressure       Patient requesting flu vaccinton Patient received VIS statement. No questions or concerns verbalized at this time. Patient consents to receiving vaccination. Patient received flu vaccination. Patient tolerated well. General comfort measures in regards to injection site given. Patient verbalized understanding.

## 2018-09-17 NOTE — PROGRESS NOTES
HISTORY OF PRESENT ILLNESS  Marco Rahman is a 48 y.o. female. HPI  Patient is here today for evaluation and treatment of: Cholesterol problem/Elevated Blood Pressure. CHOL: Patient is compliant with medication and is fasting today. Patient  does work on diet and exercise. Labs are due today. Has lost 2 pounds. .    Needs a colo;  Agrees to referral.      Elevated Blood Pressure: she is exercising ; She is eating a lower sodium diet;  + FH HTN. BP Readings from Last 3 Encounters:   09/17/18 138/80   06/14/18 148/90   03/27/18 128/70         Wt Readings from Last 3 Encounters:   09/17/18 182 lb (82.6 kg)   06/14/18 185 lb (83.9 kg)   03/27/18 180 lb 6.4 oz (81.8 kg)         Current Outpatient Prescriptions:     simvastatin (ZOCOR) 40 mg tablet, take 1 tablet by mouth every evening, Disp: 30 Tab, Rfl: 6    medroxyprogesterone (DEPO-PROVERA) 150 mg/mL Syrg, 150 mg by IntraMUSCular route once., Disp: , Rfl:     MULTIVITAMINS (MULTI-VITAMIN PO), Take  by mouth., Disp: , Rfl:       PMH,  Meds, Allergies, Family History, Social history reviewed    Review of Systems   Constitutional: Negative for chills and fever. Respiratory: Negative for shortness of breath. Cardiovascular: Negative for chest pain and palpitations.        Physical Exam   Visit Vitals    /80    Pulse 90    Temp 98.2 °F (36.8 °C) (Oral)    Resp 18    Ht 5' 6\" (1.676 m)    Wt 182 lb (82.6 kg)    SpO2 98%    BMI 29.38 kg/m2     General appearance: alert, cooperative, no distress, appears stated age  Neck: supple, symmetrical, trachea midline, no adenopathy, thyroid: not enlarged, symmetric, no tenderness/mass/nodules, no carotid bruit and no JVD  Lungs: clear to auscultation bilaterally  Heart: regular rate and rhythm, S1, S2 normal, no murmur, click, rub or gallop  Extremities: extremities normal, atraumatic, no cyanosis or edema    Lab Results   Component Value Date/Time    Cholesterol, total 168 06/16/2018 10:38 AM HDL Cholesterol 39 (L) 06/16/2018 10:38 AM    LDL, calculated 120 (H) 06/16/2018 10:38 AM    VLDL, calculated 9 06/16/2018 10:38 AM    Triglyceride 45 06/16/2018 10:38 AM    CHOL/HDL Ratio 4.3 06/16/2018 10:38 AM     Lab Results   Component Value Date/Time    Sodium 141 09/14/2017 08:53 AM    Potassium 3.8 09/14/2017 08:53 AM    Chloride 109 (H) 09/14/2017 08:53 AM    CO2 25 09/14/2017 08:53 AM    Anion gap 7 09/14/2017 08:53 AM    Glucose 103 (H) 09/14/2017 08:53 AM    BUN 14 09/14/2017 08:53 AM    Creatinine 0.86 09/14/2017 08:53 AM    BUN/Creatinine ratio 16 09/14/2017 08:53 AM    GFR est AA >60 09/14/2017 08:53 AM    GFR est non-AA >60 09/14/2017 08:53 AM    Calcium 9.0 09/14/2017 08:53 AM       ASSESSMENT and PLAN    ICD-10-CM ICD-9-CM    1. Elevated BP without diagnosis of hypertension R03.0 796.2    2. Hypercholesteremia E78.00 272.0    3. Encounter for immunization Z23 V03.89 INFLUENZA VIRUS VAC QUAD,SPLIT,PRESV FREE SYRINGE IM   4. Screening for colon cancer Z12.11 V76.51 REFERRAL TO GASTROENTEROLOGY       As above,   above all stable unless otherwise noted  Continue weight loss efforts;  Continue exercise and low sodium diet  Follow-up Disposition:  Return in about 3 months (around 12/17/2018) for well exam.  An After Visit Summary was printed and given to the patient. This has been fully explained to the patient, who indicates understanding.

## 2018-11-08 RX ORDER — SIMVASTATIN 40 MG/1
TABLET, FILM COATED ORAL
Qty: 30 TAB | Refills: 6 | Status: SHIPPED | OUTPATIENT
Start: 2018-11-08 | End: 2019-06-06 | Stop reason: SDUPTHER

## 2018-12-05 ENCOUNTER — OFFICE VISIT (OUTPATIENT)
Dept: FAMILY MEDICINE CLINIC | Age: 53
End: 2018-12-05

## 2018-12-05 VITALS
BODY MASS INDEX: 29.57 KG/M2 | OXYGEN SATURATION: 97 % | RESPIRATION RATE: 20 BRPM | WEIGHT: 184 LBS | HEART RATE: 110 BPM | TEMPERATURE: 98.2 F | HEIGHT: 66 IN | SYSTOLIC BLOOD PRESSURE: 150 MMHG | DIASTOLIC BLOOD PRESSURE: 98 MMHG

## 2018-12-05 DIAGNOSIS — Z13.6 SCREENING FOR CARDIOVASCULAR CONDITION: ICD-10-CM

## 2018-12-05 DIAGNOSIS — E78.00 HYPERCHOLESTEREMIA: ICD-10-CM

## 2018-12-05 DIAGNOSIS — Z00.00 WELL WOMAN EXAM (NO GYNECOLOGICAL EXAM): Primary | ICD-10-CM

## 2018-12-05 RX ORDER — HYDROCHLOROTHIAZIDE 25 MG/1
25 TABLET ORAL DAILY
Qty: 30 TAB | Refills: 6 | Status: SHIPPED | OUTPATIENT
Start: 2018-12-05 | End: 2019-06-06 | Stop reason: SDUPTHER

## 2018-12-05 NOTE — PROGRESS NOTES
Subjective:  
48 y.o. female for Well Woman Check. Her gyne and breast care is done elsewhere by her Ob-Gyne physician. Scheduled for colonoscopy over christmas break. She has had some elevated BP readings. Patient Active Problem List  
 Diagnosis Date Noted  Multiple thyroid nodules 01/11/2016  Nausea  Facet arthropathy  Heart murmur  Iritis Current Outpatient Medications Medication Sig Dispense Refill  hydroCHLOROthiazide (HYDRODIURIL) 25 mg tablet Take 1 Tab by mouth daily. 30 Tab 6  
 simvastatin (ZOCOR) 40 mg tablet take 1 tablet by mouth every evening 30 Tab 6  
 medroxyprogesterone (DEPO-PROVERA) 150 mg/mL Syrg 150 mg by IntraMUSCular route once.  MULTIVITAMINS (MULTI-VITAMIN PO) Take  by mouth. No Known Allergies Past Medical History:  
Diagnosis Date  Facet arthropathy  Heart murmur 2006  High cholesterol  Iritis  Low back pain  Nausea  Spinal stenosis Past Surgical History:  
Procedure Laterality Date  HX ORTHOPAEDIC  2009  
 back surgery Family History Problem Relation Age of Onset  Diabetes Mother  Heart Disease Father CAD Social History Tobacco Use  Smoking status: Never Smoker  Smokeless tobacco: Never Used Substance Use Topics  Alcohol use: No  
  
 
Lab Results Component Value Date/Time WBC 7.9 09/13/2011 08:43 AM  
 HGB 11.9 09/13/2011 08:43 AM  
 HCT 38.3 09/13/2011 08:43 AM  
 PLATELET 335 13/76/8006 08:43 AM  
 MCV 91 09/13/2011 08:43 AM  
 
Lab Results Component Value Date/Time Cholesterol, total 168 06/16/2018 10:38 AM  
 HDL Cholesterol 39 (L) 06/16/2018 10:38 AM  
 LDL, calculated 120 (H) 06/16/2018 10:38 AM  
 Triglyceride 45 06/16/2018 10:38 AM  
 CHOL/HDL Ratio 4.3 06/16/2018 10:38 AM  
 
Lab Results Component Value Date/Time ALT (SGPT) 22 06/16/2018 10:38 AM  
 AST (SGOT) 17 06/16/2018 10:38 AM  
 Alk.  phosphatase 69 09/13/2011 08:43 AM  
 Bilirubin, total 0.5 09/13/2011 08:43 AM  
 Albumin 4.4 09/13/2011 08:43 AM  
 Protein, total 7.2 09/13/2011 08:43 AM  
 INR 1.0 06/01/2011 08:02 AM  
 Prothrombin time 13.2 06/01/2011 08:02 AM  
 PLATELET 819 21/30/0816 08:43 AM  
 
Lab Results Component Value Date/Time GFR est non-AA >60 09/14/2017 08:53 AM  
 GFR est AA >60 09/14/2017 08:53 AM  
 Creatinine 0.86 09/14/2017 08:53 AM  
 BUN 14 09/14/2017 08:53 AM  
 Sodium 141 09/14/2017 08:53 AM  
 Potassium 3.8 09/14/2017 08:53 AM  
 Chloride 109 (H) 09/14/2017 08:53 AM  
 CO2 25 09/14/2017 08:53 AM  
  
 
ROS: Feeling generally well. No TIA's or unusual headaches, no dysphagia. No prolonged cough. No dyspnea or chest pain on exertion. No abdominal pain, change in bowel habits, black or bloody stools. No urinary tract symptoms. No new or unusual musculoskeletal symptoms. Specific concerns today: none. BP is elevated. . 
 
Objective: The patient appears well, alert, oriented x 3, in no distress. Visit Vitals BP (!) 150/98 Pulse (!) 110 Temp 98.2 °F (36.8 °C) (Oral) Resp 20 Ht 5' 6\" (1.676 m) Wt 184 lb (83.5 kg) SpO2 97% BMI 29.70 kg/m² ENT normal.  Neck supple. No adenopathy or thyromegaly. JUDE. Lungs are clear, good air entry, no wheezes, rhonchi or rales. S1 and S2 normal, no murmurs, regular rate and rhythm. Abdomen soft without tenderness, guarding, mass or organomegaly. Extremities show no edema, normal peripheral pulses. Neurological is normal, no focal findings. Breast and Pelvic exams are deferred. Assessment/Plan:  
Well Woman-  
follow low fat diet, follow low salt diet, continue present plan, routine labs ordered ICD-10-CM ICD-9-CM 1. Well woman exam (no gynecological exam) Z00.00 V70.0 [V70.0] As above 
 treatment plan as listed below Orders Placed This Encounter  hydroCHLOROthiazide (HYDRODIURIL) 25 mg tablet Orders Placed This Encounter  hydroCHLOROthiazide (HYDRODIURIL) 25 mg tablet Start HCTZ as ordered; side effects reviewed Follow-up Disposition: 
Return in about 8 weeks (around 1/30/2019) for BP/HCTZ. An After Visit Summary was printed and given to the patient. This has been fully explained to the patient, who indicates understanding.'

## 2018-12-05 NOTE — PATIENT INSTRUCTIONS
Well Visit, Women 48 to 72: Care Instructions Your Care Instructions Physical exams can help you stay healthy. Your doctor has checked your overall health and may have suggested ways to take good care of yourself. He or she also may have recommended tests. At home, you can help prevent illness with healthy eating, regular exercise, and other steps. Follow-up care is a key part of your treatment and safety. Be sure to make and go to all appointments, and call your doctor if you are having problems. It's also a good idea to know your test results and keep a list of the medicines you take. How can you care for yourself at home? · Reach and stay at a healthy weight. This will lower your risk for many problems, such as obesity, diabetes, heart disease, and high blood pressure. · Get at least 30 minutes of exercise on most days of the week. Walking is a good choice. You also may want to do other activities, such as running, swimming, cycling, or playing tennis or team sports. · Do not smoke. Smoking can make health problems worse. If you need help quitting, talk to your doctor about stop-smoking programs and medicines. These can increase your chances of quitting for good. · Protect your skin from too much sun. When you're outdoors from 10 a.m. to 4 p.m., stay in the shade or cover up with clothing and a hat with a wide brim. Wear sunglasses that block UV rays. Even when it's cloudy, put broad-spectrum sunscreen (SPF 30 or higher) on any exposed skin. · See a dentist one or two times a year for checkups and to have your teeth cleaned. · Wear a seat belt in the car. · Limit alcohol to 1 drink a day. Too much alcohol can cause health problems. Follow your doctor's advice about when to have certain tests. These tests can spot problems early. · Cholesterol.  Your doctor will tell you how often to have this done based on your age, family history, or other things that can increase your risk for heart attack and stroke. · Blood pressure. Have your blood pressure checked during a routine doctor visit. Your doctor will tell you how often to check your blood pressure based on your age, your blood pressure results, and other factors. · Mammogram. Ask your doctor how often you should have a mammogram, which is an X-ray of your breasts. A mammogram can spot breast cancer before it can be felt and when it is easiest to treat. · Pap test and pelvic exam. Ask your doctor how often you should have a Pap test. You may not need to have a Pap test as often as you used to. · Vision. Have your eyes checked every year or two or as often as your doctor suggests. Some experts recommend that you have yearly exams for glaucoma and other age-related eye problems starting at age 48. · Hearing. Tell your doctor if you notice any change in your hearing. You can have tests to find out how well you hear. · Diabetes. Ask your doctor whether you should have tests for diabetes. · Colon cancer. You should begin tests for colon cancer at age 48. You may have one of several tests. Your doctor will tell you how often to have tests based on your age and risk. Risks include whether you already had a precancerous polyp removed from your colon or whether your parents, sisters and brothers, or children have had colon cancer. · Thyroid disease. Talk to your doctor about whether to have your thyroid checked as part of a regular physical exam. Women have an increased chance of a thyroid problem. · Osteoporosis. You should begin tests for bone density at age 72. If you are younger than 72, ask your doctor whether you have factors that may increase your risk for this disease. You may want to have this test before age 72. · Heart attack and stroke risk. At least every 4 to 6 years, you should have your risk for heart attack and stroke assessed.  Your doctor uses factors such as your age, blood pressure, cholesterol, and whether you smoke or have diabetes to show what your risk for a heart attack or stroke is over the next 10 years. When should you call for help? Watch closely for changes in your health, and be sure to contact your doctor if you have any problems or symptoms that concern you. Where can you learn more? Go to http://jose guadalupe-juan josé.info/. Enter R075 in the search box to learn more about \"Well Visit, Women 50 to 72: Care Instructions. \" Current as of: March 29, 2018 Content Version: 11.8 © 9940-1447 [employer]. Care instructions adapted under license by [employer] (which disclaims liability or warranty for this information). If you have questions about a medical condition or this instruction, always ask your healthcare professional. [address] any warranty or liability for your use of this information.

## 2018-12-05 NOTE — PROGRESS NOTES
1. Have you been to the ER, urgent care clinic since your last visit? Hospitalized since your last visit? No 
 
2. Have you seen or consulted any other health care providers outside of the [address] [place] [address] since your last visit? Include any pap smears or colon screening. Yes Anna Marie Ventura MD - endocrinology

## 2018-12-31 ENCOUNTER — HOSPITAL ENCOUNTER (OUTPATIENT)
Dept: LAB | Age: 53
Discharge: HOME | End: 2018-12-31
Payer: OTHER GOVERNMENT

## 2018-12-31 DIAGNOSIS — Z13.6 SCREENING FOR CARDIOVASCULAR CONDITION: ICD-10-CM

## 2018-12-31 DIAGNOSIS — E78.00 HYPERCHOLESTEREMIA: ICD-10-CM

## 2018-12-31 LAB
ALT SERPL-CCNC: 27 U/L (ref 13–56)
ANION GAP SERPL CALC-SCNC: 6 MMOL/L (ref 3–18)
AST SERPL-CCNC: 16 U/L (ref 15–37)
BUN SERPL-MCNC: 11 MG/DL (ref 7–18)
BUN/CREAT SERPL: 14 (ref 12–20)
CALCIUM SERPL-MCNC: 9.1 MG/DL (ref 8.5–10.1)
CHLORIDE SERPL-SCNC: 106 MMOL/L (ref 100–108)
CHOLEST SERPL-MCNC: 181 MG/DL
CO2 SERPL-SCNC: 27 MMOL/L (ref 21–32)
CREAT SERPL-MCNC: 0.79 MG/DL (ref 0.6–1.3)
GLUCOSE SERPL-MCNC: 89 MG/DL (ref 74–99)
HDLC SERPL-MCNC: 39 MG/DL (ref 40–60)
HDLC SERPL: 4.6 {RATIO} (ref 0–5)
LDLC SERPL CALC-MCNC: 132.6 MG/DL (ref 0–100)
LIPID PROFILE,FLP: ABNORMAL
POTASSIUM SERPL-SCNC: 3.9 MMOL/L (ref 3.5–5.5)
SODIUM SERPL-SCNC: 139 MMOL/L (ref 136–145)
TRIGL SERPL-MCNC: 47 MG/DL (ref ?–150)
VLDLC SERPL CALC-MCNC: 9.4 MG/DL

## 2018-12-31 PROCEDURE — 80061 LIPID PANEL: CPT

## 2018-12-31 PROCEDURE — 84450 TRANSFERASE (AST) (SGOT): CPT

## 2018-12-31 PROCEDURE — 36415 COLL VENOUS BLD VENIPUNCTURE: CPT

## 2018-12-31 PROCEDURE — 84460 ALANINE AMINO (ALT) (SGPT): CPT

## 2018-12-31 PROCEDURE — 80048 BASIC METABOLIC PNL TOTAL CA: CPT

## 2019-02-05 ENCOUNTER — OFFICE VISIT (OUTPATIENT)
Dept: FAMILY MEDICINE CLINIC | Age: 54
End: 2019-02-05

## 2019-02-05 VITALS
TEMPERATURE: 98.2 F | DIASTOLIC BLOOD PRESSURE: 80 MMHG | SYSTOLIC BLOOD PRESSURE: 128 MMHG | BODY MASS INDEX: 29.57 KG/M2 | WEIGHT: 184 LBS | HEART RATE: 106 BPM | HEIGHT: 66 IN | OXYGEN SATURATION: 98 % | RESPIRATION RATE: 18 BRPM

## 2019-02-05 DIAGNOSIS — E78.00 HYPERCHOLESTEREMIA: ICD-10-CM

## 2019-02-05 DIAGNOSIS — I10 ESSENTIAL HYPERTENSION: Primary | ICD-10-CM

## 2019-02-05 NOTE — PROGRESS NOTES
HISTORY OF PRESENT ILLNESS  Jannet Fishman is a 48 y.o. female. HPI     Patient is here today for evaluation and treatment of: Hypertension     Hypertension:  Pt has started HCTZ. She has tolerated med. She did note some increased urinary frequency. She is also walking for exercise    Wt Readings from Last 3 Encounters:   02/05/19 184 lb (83.5 kg)   12/05/18 184 lb (83.5 kg)   09/17/18 182 lb (82.6 kg)     Pt continues on zocor for hypercholesterolemia; she complies with med regimen. She had labs last done at the end of December. Current Outpatient Medications:     hydroCHLOROthiazide (HYDRODIURIL) 25 mg tablet, Take 1 Tab by mouth daily. , Disp: 30 Tab, Rfl: 6    simvastatin (ZOCOR) 40 mg tablet, take 1 tablet by mouth every evening, Disp: 30 Tab, Rfl: 6    medroxyprogesterone (DEPO-PROVERA) 150 mg/mL Syrg, 150 mg by IntraMUSCular route once., Disp: , Rfl:     MULTIVITAMINS (MULTI-VITAMIN PO), Take  by mouth., Disp: , Rfl:       PMH,  Meds, Allergies, Family History, Social history reviewed      Review of Systems   Constitutional: Negative for chills and fever. Respiratory: Negative for shortness of breath and wheezing. Cardiovascular: Negative for chest pain and palpitations. Physical Exam   Constitutional: She appears well-developed and well-nourished. No distress. Neck: Neck supple. No thyromegaly present. Cardiovascular: Normal rate and regular rhythm. Exam reveals no gallop and no friction rub. No murmur heard. Pulmonary/Chest: Breath sounds normal. No respiratory distress. She has no wheezes. She has no rales. Musculoskeletal: She exhibits no edema. Vitals reviewed.      Visit Vitals  /80   Pulse (!) 106   Temp 98.2 °F (36.8 °C) (Oral)   Resp 18   Ht 5' 6\" (1.676 m)   Wt 184 lb (83.5 kg)   SpO2 98%   BMI 29.70 kg/m²       Lab Results   Component Value Date/Time    Cholesterol, total 181 12/31/2018 09:21 AM    HDL Cholesterol 39 (L) 12/31/2018 09:21 AM    LDL, calculated 132.6 (H) 12/31/2018 09:21 AM    VLDL, calculated 9.4 12/31/2018 09:21 AM    Triglyceride 47 12/31/2018 09:21 AM    CHOL/HDL Ratio 4.6 12/31/2018 09:21 AM     Lab Results   Component Value Date/Time    Sodium 139 12/31/2018 09:21 AM    Potassium 3.9 12/31/2018 09:21 AM    Chloride 106 12/31/2018 09:21 AM    CO2 27 12/31/2018 09:21 AM    Anion gap 6 12/31/2018 09:21 AM    Glucose 89 12/31/2018 09:21 AM    BUN 11 12/31/2018 09:21 AM    Creatinine 0.79 12/31/2018 09:21 AM    BUN/Creatinine ratio 14 12/31/2018 09:21 AM    GFR est AA >60 12/31/2018 09:21 AM    GFR est non-AA >60 12/31/2018 09:21 AM    Calcium 9.1 12/31/2018 09:21 AM       ASSESSMENT and PLAN    ICD-10-CM ICD-9-CM    1. Essential hypertension I10 401.9    2. Hypercholesteremia E78.00 272.0        As above,   above all stable unless otherwise noted   treatment plan as listed below  Continue HCTZ  Continue diet and exercise to control BP, Cholesterol  Follow-up Disposition:  Return in about 4 months (around 6/5/2019) for BP/chol. An After Visit Summary was printed and given to the patient. This has been fully explained to the patient, who indicates understanding.

## 2019-02-05 NOTE — PATIENT INSTRUCTIONS
Learning About Diuretics for High Blood Pressure  Introduction  Diuretics help to lower blood pressure. This reduces your risk of a heart attack and stroke. It also reduces your risk of kidney disease. Diuretics cause your kidneys to remove sodium and water. They also relax the blood vessel walls. These help lower your blood pressure. Examples  · Chlorthalidone  · Hydrochlorothiazide  Possible side effects  There are some common side effects. They are:  · Too little potassium. · Feeling dizzy. · Rash. · Urinating a lot. · High blood sugar. (But this is not common.)  You may have other side effects. Check the information that comes with your medicine. What to know about taking this medicine  · You may take other medicines for blood pressure. Diuretics can help those work better. They can also prevent extra fluid in your body. · You may need to take potassium pills. Or you may have to watch how much potassium is in your food. Ask your doctor about this. · You may need blood tests to check your kidneys and your potassium level. · Take your medicines exactly as prescribed. Call your doctor if you think you are having a problem with your medicine. · Check with your doctor or pharmacist before you use any other medicines. This includes over-the-counter medicines. Make sure your doctor knows all of the medicines, vitamins, herbal products, and supplements you take. Taking some medicines together can cause problems. Where can you learn more? Go to http://jose guadalupe-juan josé.info/. Enter C407 in the search box to learn more about \"Learning About Diuretics for High Blood Pressure. \"  Current as of: July 22, 2018  Content Version: 11.9  © 0504-0633 [employer]. Care instructions adapted under license by [employer] (which disclaims liability or warranty for this information).  If you have questions about a medical condition or this instruction, always ask your healthcare professional. [address] any warranty or liability for your use of this information.

## 2019-02-05 NOTE — PROGRESS NOTES
Patient here for a HTN  Follow up. 1. Have you been to the ER, urgent care clinic since your last visit? Hospitalized since your last visit? No    2. Have you seen or consulted any other health care providers outside of the [address] [place] [address] since your last visit? Include any pap smears or colon screening.  No

## 2019-06-06 ENCOUNTER — OFFICE VISIT (OUTPATIENT)
Dept: FAMILY MEDICINE CLINIC | Age: 54
End: 2019-06-06

## 2019-06-06 ENCOUNTER — HOSPITAL ENCOUNTER (OUTPATIENT)
Dept: LAB | Age: 54
Discharge: HOME | End: 2019-06-06
Payer: OTHER GOVERNMENT

## 2019-06-06 VITALS
HEIGHT: 66 IN | HEART RATE: 105 BPM | TEMPERATURE: 98.5 F | SYSTOLIC BLOOD PRESSURE: 120 MMHG | OXYGEN SATURATION: 98 % | BODY MASS INDEX: 29.06 KG/M2 | RESPIRATION RATE: 18 BRPM | DIASTOLIC BLOOD PRESSURE: 70 MMHG | WEIGHT: 180.8 LBS

## 2019-06-06 DIAGNOSIS — I10 ESSENTIAL HYPERTENSION: Primary | ICD-10-CM

## 2019-06-06 DIAGNOSIS — E78.00 HYPERCHOLESTEREMIA: ICD-10-CM

## 2019-06-06 DIAGNOSIS — I10 ESSENTIAL HYPERTENSION: ICD-10-CM

## 2019-06-06 DIAGNOSIS — Z12.11 SCREENING FOR COLON CANCER: ICD-10-CM

## 2019-06-06 PROCEDURE — 84460 ALANINE AMINO (ALT) (SGPT): CPT

## 2019-06-06 PROCEDURE — 36415 COLL VENOUS BLD VENIPUNCTURE: CPT

## 2019-06-06 PROCEDURE — 80061 LIPID PANEL: CPT

## 2019-06-06 PROCEDURE — 84450 TRANSFERASE (AST) (SGOT): CPT

## 2019-06-06 PROCEDURE — 80048 BASIC METABOLIC PNL TOTAL CA: CPT

## 2019-06-06 RX ORDER — HYDROCHLOROTHIAZIDE 25 MG/1
25 TABLET ORAL DAILY
Qty: 30 TAB | Refills: 6 | Status: SHIPPED | OUTPATIENT
Start: 2019-06-06 | End: 2020-05-08

## 2019-06-06 RX ORDER — SIMVASTATIN 40 MG/1
TABLET, FILM COATED ORAL
Qty: 30 TAB | Refills: 6 | Status: SHIPPED | OUTPATIENT
Start: 2019-06-06 | End: 2020-03-08

## 2019-06-06 NOTE — PATIENT INSTRUCTIONS
High Blood Pressure: Care Instructions Overview It's normal for blood pressure to go up and down throughout the day. But if it stays up, you have high blood pressure. Another name for high blood pressure is hypertension. Despite what a lot of people think, high blood pressure usually doesn't cause headaches or make you feel dizzy or lightheaded. It usually has no symptoms. But it does increase your risk of stroke, heart attack, and other problems. You and your doctor will talk about your risks of these problems based on your blood pressure. Your doctor will give you a goal for your blood pressure. Your goal will be based on your health and your age. Lifestyle changes, such as eating healthy and being active, are always important to help lower blood pressure. You might also take medicine to reach your blood pressure goal. 
Follow-up care is a key part of your treatment and safety. Be sure to make and go to all appointments, and call your doctor if you are having problems. It's also a good idea to know your test results and keep a list of the medicines you take. How can you care for yourself at home? Medical treatment · If you stop taking your medicine, your blood pressure will go back up. You may take one or more types of medicine to lower your blood pressure. Be safe with medicines. Take your medicine exactly as prescribed. Call your doctor if you think you are having a problem with your medicine. · Talk to your doctor before you start taking aspirin every day. Aspirin can help certain people lower their risk of a heart attack or stroke. But taking aspirin isn't right for everyone, because it can cause serious bleeding. · See your doctor regularly. You may need to see the doctor more often at first or until your blood pressure comes down. · If you are taking blood pressure medicine, talk to your doctor before you take decongestants or anti-inflammatory medicine, such as ibuprofen. Some of these medicines can raise blood pressure. · Learn how to check your blood pressure at home. Lifestyle changes · Stay at a healthy weight. This is especially important if you put on weight around the waist. Losing even 10 pounds can help you lower your blood pressure. · If your doctor recommends it, get more exercise. Walking is a good choice. Bit by bit, increase the amount you walk every day. Try for at least 30 minutes on most days of the week. You also may want to swim, bike, or do other activities. · Avoid or limit alcohol. Talk to your doctor about whether you can drink any alcohol. · Try to limit how much sodium you eat to less than 2,300 milligrams (mg) a day. Your doctor may ask you to try to eat less than 1,500 mg a day. · Eat plenty of fruits (such as bananas and oranges), vegetables, legumes, whole grains, and low-fat dairy products. · Lower the amount of saturated fat in your diet. Saturated fat is found in animal products such as milk, cheese, and meat. Limiting these foods may help you lose weight and also lower your risk for heart disease. · Do not smoke. Smoking increases your risk for heart attack and stroke. If you need help quitting, talk to your doctor about stop-smoking programs and medicines. These can increase your chances of quitting for good. When should you call for help? Call 911 anytime you think you may need emergency care. This may mean having symptoms that suggest that your blood pressure is causing a serious heart or blood vessel problem. Your blood pressure may be over 180/120. 
 For example, call 911 if: 
  · You have symptoms of a heart attack. These may include: 
? Chest pain or pressure, or a strange feeling in the chest. 
? Sweating. ? Shortness of breath. ? Nausea or vomiting. ? Pain, pressure, or a strange feeling in the back, neck, jaw, or upper belly or in one or both shoulders or arms. ? Lightheadedness or sudden weakness. ? A fast or irregular heartbeat.  
  · You have symptoms of a stroke. These may include: 
? Sudden numbness, tingling, weakness, or loss of movement in your face, arm, or leg, especially on only one side of your body. ? Sudden vision changes. ? Sudden trouble speaking. ? Sudden confusion or trouble understanding simple statements. ? Sudden problems with walking or balance. ? A sudden, severe headache that is different from past headaches.  
  · You have severe back or belly pain.  
 Do not wait until your blood pressure comes down on its own. Get help right away. 
 Call your doctor now or seek immediate care if: 
  · Your blood pressure is much higher than normal (such as 180/120 or higher), but you don't have symptoms.  
  · You think high blood pressure is causing symptoms, such as: 
? Severe headache. 
? Blurry vision.  
 Watch closely for changes in your health, and be sure to contact your doctor if: 
  · Your blood pressure measures higher than your doctor recommends at least 2 times. That means the top number is higher or the bottom number is higher, or both.  
  · You think you may be having side effects from your blood pressure medicine. Where can you learn more? Go to http://jose guadalupe-juan josé.info/. Enter A937 in the search box to learn more about \"High Blood Pressure: Care Instructions. \" Current as of: July 22, 2018 Content Version: 11.9 © 2981-8195 [employer]. Care instructions adapted under license by [employer] (which disclaims liability or warranty for this information). If you have questions about a medical condition or this instruction, always ask your healthcare professional. [place] [address] any warranty or liability for your use of this information.

## 2019-06-06 NOTE — PROGRESS NOTES
Patient is here today for follow up treatment of hypertension and cholesterol problems. 1. Have you been to the ER, urgent care clinic since your last visit? Hospitalized since your last visit? No    2. Have you seen or consulted any other health care providers outside of the [address] [place] [address] since your last visit? Include any pap smears or colon screening.  No

## 2019-06-06 NOTE — PROGRESS NOTES
HISTORY OF PRESENT ILLNESS  Woo Remedies is a 48 y.o. female. HPI  Patient is here today for evaluation and treatment of: /Hypertension/Cholesterol problem    Hypertension:    Initial BP is slightly elevated. On HCTZ. Cholesterol: On zocor; tolerates med well; Attempts a lower chol diet,    Agrees to cologuard testing. Current Outpatient Medications:     hydroCHLOROthiazide (HYDRODIURIL) 25 mg tablet, Take 1 Tab by mouth daily. , Disp: 30 Tab, Rfl: 6    simvastatin (ZOCOR) 40 mg tablet, take 1 tablet by mouth every evening, Disp: 30 Tab, Rfl: 6    medroxyprogesterone (DEPO-PROVERA) 150 mg/mL Syrg, 150 mg by IntraMUSCular route once., Disp: , Rfl:     MULTIVITAMINS (MULTI-VITAMIN PO), Take  by mouth., Disp: , Rfl:   PMH,  Meds, Allergies, Family History, Social history reviewed      Review of Systems   Constitutional: Negative for chills and fever. Respiratory: Negative for shortness of breath and wheezing. Cardiovascular: Negative for chest pain and palpitations. Physical Exam   Constitutional: She appears well-developed and well-nourished. No distress. Cardiovascular: Normal rate. Exam reveals no gallop and no friction rub. No murmur heard. Pulmonary/Chest: Breath sounds normal. No respiratory distress. She has no wheezes. Musculoskeletal: She exhibits no edema. Nursing note and vitals reviewed.      Visit Vitals  /70   Pulse (!) 105   Temp 98.5 °F (36.9 °C) (Oral)   Resp 18   Ht 5' 6\" (1.676 m)   Wt 180 lb 12.8 oz (82 kg)   SpO2 98%   BMI 29.18 kg/m²     Lab Results   Component Value Date/Time    Cholesterol, total 181 12/31/2018 09:21 AM    HDL Cholesterol 39 (L) 12/31/2018 09:21 AM    LDL, calculated 132.6 (H) 12/31/2018 09:21 AM    VLDL, calculated 9.4 12/31/2018 09:21 AM    Triglyceride 47 12/31/2018 09:21 AM    CHOL/HDL Ratio 4.6 12/31/2018 09:21 AM     Lab Results   Component Value Date/Time    Sodium 139 12/31/2018 09:21 AM    Potassium 3.9 12/31/2018 09:21 AM Chloride 106 12/31/2018 09:21 AM    CO2 27 12/31/2018 09:21 AM    Anion gap 6 12/31/2018 09:21 AM    Glucose 89 12/31/2018 09:21 AM    BUN 11 12/31/2018 09:21 AM    Creatinine 0.79 12/31/2018 09:21 AM    BUN/Creatinine ratio 14 12/31/2018 09:21 AM    GFR est AA >60 12/31/2018 09:21 AM    GFR est non-AA >60 12/31/2018 09:21 AM    Calcium 9.1 12/31/2018 09:21 AM         ASSESSMENT and PLAN    ICD-10-CM ICD-9-CM    1. Essential hypertension L95 959.5 METABOLIC PANEL, BASIC   2. Hypercholesteremia E78.00 272.0 LIPID PANEL      AST      ALT   3. Screening for colon cancer Z12.11 V76.51 COLOGUARD TEST (FECAL DNA COLORECTAL CANCER SCREENING)       As above,   above all stable unless otherwise noted   treatment plan as listed below  Orders Placed This Encounter    METABOLIC PANEL, BASIC    LIPID PANEL    AST    ALT    COLOGUARD TEST (FECAL DNA COLORECTAL CANCER SCREENING)    hydroCHLOROthiazide (HYDRODIURIL) 25 mg tablet    simvastatin (ZOCOR) 40 mg tablet     Follow-up and Dispositions    · Return in about 6 months (around 12/6/2019) for well exam.       An After Visit Summary was printed and given to the patient. This has been fully explained to the patient, who indicates understanding.

## 2019-06-07 LAB
ALT SERPL-CCNC: 27 U/L (ref 13–56)
ANION GAP SERPL CALC-SCNC: 8 MMOL/L (ref 3–18)
AST SERPL-CCNC: 16 U/L (ref 15–37)
BUN SERPL-MCNC: 15 MG/DL (ref 7–18)
BUN/CREAT SERPL: 19 (ref 12–20)
CALCIUM SERPL-MCNC: 9.4 MG/DL (ref 8.5–10.1)
CHLORIDE SERPL-SCNC: 102 MMOL/L (ref 100–108)
CHOLEST SERPL-MCNC: 183 MG/DL
CO2 SERPL-SCNC: 31 MMOL/L (ref 21–32)
CREAT SERPL-MCNC: 0.81 MG/DL (ref 0.6–1.3)
GLUCOSE SERPL-MCNC: 92 MG/DL (ref 74–99)
HDLC SERPL-MCNC: 51 MG/DL (ref 40–60)
HDLC SERPL: 3.6 {RATIO} (ref 0–5)
LDLC SERPL CALC-MCNC: 124.6 MG/DL (ref 0–100)
LIPID PROFILE,FLP: ABNORMAL
POTASSIUM SERPL-SCNC: 3.5 MMOL/L (ref 3.5–5.5)
SODIUM SERPL-SCNC: 141 MMOL/L (ref 136–145)
TRIGL SERPL-MCNC: 37 MG/DL (ref ?–150)
VLDLC SERPL CALC-MCNC: 7.4 MG/DL

## 2019-06-17 ENCOUNTER — OFFICE VISIT (OUTPATIENT)
Dept: ORTHOPEDIC SURGERY | Age: 54
End: 2019-06-17

## 2019-06-17 VITALS
OXYGEN SATURATION: 98 % | SYSTOLIC BLOOD PRESSURE: 137 MMHG | HEART RATE: 94 BPM | WEIGHT: 181 LBS | TEMPERATURE: 98.2 F | RESPIRATION RATE: 18 BRPM | HEIGHT: 66 IN | DIASTOLIC BLOOD PRESSURE: 83 MMHG | BODY MASS INDEX: 29.09 KG/M2

## 2019-06-17 DIAGNOSIS — M54.2 NECK PAIN: Primary | ICD-10-CM

## 2019-06-17 DIAGNOSIS — M50.30 DDD (DEGENERATIVE DISC DISEASE), CERVICAL: ICD-10-CM

## 2019-06-17 DIAGNOSIS — M62.838 MUSCLE SPASM: ICD-10-CM

## 2019-06-17 DIAGNOSIS — M47.812 CERVICAL FACET JOINT SYNDROME: ICD-10-CM

## 2019-06-17 DIAGNOSIS — M79.18 MYOFASCIAL PAIN: ICD-10-CM

## 2019-06-17 RX ORDER — METHYLPREDNISOLONE 4 MG/1
TABLET ORAL
Qty: 1 DOSE PACK | Refills: 0 | Status: SHIPPED | OUTPATIENT
Start: 2019-06-17 | End: 2019-08-19 | Stop reason: ALTCHOICE

## 2019-06-17 RX ORDER — KETOROLAC TROMETHAMINE 15 MG/ML
60 INJECTION, SOLUTION INTRAMUSCULAR; INTRAVENOUS ONCE
Qty: 1 VIAL | Refills: 0
Start: 2019-06-17 | End: 2019-06-17

## 2019-06-17 RX ORDER — DICLOFENAC SODIUM 75 MG/1
75 TABLET, DELAYED RELEASE ORAL 2 TIMES DAILY WITH MEALS
Qty: 60 TAB | Refills: 1 | Status: SHIPPED | OUTPATIENT
Start: 2019-06-17 | End: 2020-01-21

## 2019-06-17 RX ORDER — METAXALONE 800 MG/1
800 TABLET ORAL
Qty: 60 TAB | Refills: 1 | Status: SHIPPED | OUTPATIENT
Start: 2019-06-17 | End: 2020-01-21

## 2019-06-17 NOTE — LETTER
6/19/19 Patient: Dearl Members YOB: 1965 Date of Visit: 6/17/2019 Salena Green MD 
[address] [facility] [address] [facility] [address] VIA In Basket Dear Salena Green MD, Thank you for referring Ms. Sissy Mcghee to [address] [facility] for evaluation. My notes for this consultation are attached. If you have questions, please do not hesitate to call me. I look forward to following your patient along with you. Sincerely, Rima Stern MD

## 2019-06-17 NOTE — PROGRESS NOTES
[facility]  [address] [place] [address], [facility] [address]  Phone: [phone]  Fax: [phone]    NEW PATIENT  Pt's YOB: 1965    ASSESSMENT   Diagnoses and all orders for this visit:    1. Neck pain  -     AMB POC XRAY, SPINE, CERVICAL; 2 OR 3  -     methylPREDNISolone (MEDROL DOSEPACK) 4 mg tablet; Per dose pack instructions  -     REFERRAL TO PHYSICAL THERAPY  -     diclofenac EC (VOLTAREN) 75 mg EC tablet; Take 1 Tab by mouth two (2) times daily (with meals). -     KETOROLAC TROMETHAMINE INJ  -     ketorolac (TORADOL) 15 mg/mL soln injection; 4 mL by IntraMUSCular route once for 1 dose. -     KS THER/PROPH/DIAG INJECTION, SUBCUT/IM    2. Cervical facet joint syndrome  -     methylPREDNISolone (MEDROL DOSEPACK) 4 mg tablet; Per dose pack instructions  -     REFERRAL TO PHYSICAL THERAPY  -     diclofenac EC (VOLTAREN) 75 mg EC tablet; Take 1 Tab by mouth two (2) times daily (with meals). -     KETOROLAC TROMETHAMINE INJ  -     ketorolac (TORADOL) 15 mg/mL soln injection; 4 mL by IntraMUSCular route once for 1 dose. -     KS THER/PROPH/DIAG INJECTION, SUBCUT/IM    3. DDD (degenerative disc disease), cervical  -     methylPREDNISolone (MEDROL DOSEPACK) 4 mg tablet; Per dose pack instructions  -     REFERRAL TO PHYSICAL THERAPY  -     diclofenac EC (VOLTAREN) 75 mg EC tablet; Take 1 Tab by mouth two (2) times daily (with meals). -     KETOROLAC TROMETHAMINE INJ  -     ketorolac (TORADOL) 15 mg/mL soln injection; 4 mL by IntraMUSCular route once for 1 dose. -     KS THER/PROPH/DIAG INJECTION, SUBCUT/IM    4. Myofascial pain  -     REFERRAL TO PHYSICAL THERAPY  -     metaxalone (SKELAXIN) 800 mg tablet; Take 1 Tab by mouth two (2) times daily as needed (muscle spasms). 5. Muscle spasm  -     REFERRAL TO PHYSICAL THERAPY  -     metaxalone (SKELAXIN) 800 mg tablet; Take 1 Tab by mouth two (2) times daily as needed (muscle spasms). IMPRESSION AND PLAN:  Cally Kilpatrick is a 48 y.o. right hand dominant female with history of neck pain. Pt complains of pain in the neck and upper back that radiates down the left arm. She notes that her symptoms started in 05/2019 and have progressively worsened. 1) Pt was given information on cervical arthritis exercises. 2) She was prescribed a Medrol Dosepak. 3) Pt was also prescribed Voltaren 75 mg 1 tab BID prn with food. 4) She was prescribed Skelaxin 800 mg 1 tab BID prn muscle spasm. 5) Pt was referred to cervical physical therapy. 6) Discussed ordering a cervical MRI pending on persistent or worsening symptoms. 7) Ms. Fred Evans has a reminder for a \"due or due soon\" health maintenance. I have asked that she contact her primary care provider, Shaina Quevedo MD, for follow-up on this health maintenance. 8)  demonstrated consistency with prescribing. Follow-up and Dispositions    · Return in about 6 weeks (around 7/29/2019) for PT follow up, Medication follow up. HISTORY OF PRESENT ILLNESS:  Cally Kilpatrick is a 48 y.o. right hand dominant female with history of neck pain. Pt presents to the office today as a new patient. Pt complains of pain in the neck and upper back that radiates down the left arm. She notes that her symptoms started in 05/2019 and have progressively worsened. Pt denies any pain radiating down the right arm at this time. She denies dropping objects, weakness in the arms, or numbness in the hands. Pt admits to difficulty turning her head to the left while driving but denies any difficulty with overhead motion. Pt reports pain when laying down. Her symptoms are generally worse when she uses the computer. Of note, she is an  which requires her to use a computer throughout the day. Pt at this time desires to proceed with medication evaluation and physical therapy.     Pain Scale: 5/10     PCP: Shaina Quevedo MD    Past Medical History: Diagnosis Date    Facet arthropathy     Heart murmur 2006    High cholesterol     Iritis     Low back pain     Nausea     Spinal stenosis         Social History     Socioeconomic History    Marital status:      Spouse name: Not on file    Number of children: Not on file    Years of education: Not on file    Highest education level: Not on file   Occupational History    Not on file   Social Needs    Financial resource strain: Not on file    Food insecurity:     Worry: Not on file     Inability: Not on file    Transportation needs:     Medical: Not on file     Non-medical: Not on file   Tobacco Use    Smoking status: Never Smoker    Smokeless tobacco: Never Used   Substance and Sexual Activity    Alcohol use: No    Drug use: No    Sexual activity: Not on file     Comment:    Lifestyle    Physical activity:     Days per week: Not on file     Minutes per session: Not on file    Stress: Not on file   Relationships    Social connections:     Talks on phone: Not on file     Gets together: Not on file     Attends Yarsanism service: Not on file     Active member of club or organization: Not on file     Attends meetings of clubs or organizations: Not on file     Relationship status: Not on file    Intimate partner violence:     Fear of current or ex partner: Not on file     Emotionally abused: Not on file     Physically abused: Not on file     Forced sexual activity: Not on file   Other Topics Concern     Service Not Asked    Blood Transfusions Not Asked    Caffeine Concern Yes     Comment: 4 oz three times a week    Occupational Exposure Not Asked   Hettie Model Hazards Not Asked    Sleep Concern Not Asked    Stress Concern Not Asked    Weight Concern Not Asked    Special Diet Not Asked    Back Care Not Asked    Exercise Yes     Comment: walks 30 minutes 3 to 4 times a week or 5,000 steps a day     Bike Helmet Not Asked    Seat Belt Yes    Self-Exams Not Asked   Social History Narrative    Not on file       Current Outpatient Medications   Medication Sig Dispense Refill    methylPREDNISolone (MEDROL DOSEPACK) 4 mg tablet Per dose pack instructions 1 Dose Pack 0    diclofenac EC (VOLTAREN) 75 mg EC tablet Take 1 Tab by mouth two (2) times daily (with meals). 60 Tab 1    metaxalone (SKELAXIN) 800 mg tablet Take 1 Tab by mouth two (2) times daily as needed (muscle spasms). 60 Tab 1    hydroCHLOROthiazide (HYDRODIURIL) 25 mg tablet Take 1 Tab by mouth daily. 30 Tab 6    simvastatin (ZOCOR) 40 mg tablet take 1 tablet by mouth every evening 30 Tab 6    medroxyprogesterone (DEPO-PROVERA) 150 mg/mL Syrg 150 mg by IntraMUSCular route once.  MULTIVITAMINS (MULTI-VITAMIN PO) Take  by mouth. No Known Allergies    REVIEW OF SYSTEMS    Constitutional: Negative for fever, chills, or weight change. Respiratory: Negative for cough or shortness of breath. Cardiovascular: Negative for chest pain or palpitations. Gastrointestinal: Negative for acid reflux, change in bowel habits, or constipation. Genitourinary: Negative for dysuria and flank pain. Musculoskeletal: Positive for cervical pain. Skin: Negative for rash. Neurological: Negative for headaches, dizziness, or numbness. Endo/Heme/Allergies: Negative for increased bruising. Psychiatric/Behavioral: Negative for difficulty with sleep. PHYSICAL EXAMINATION  Visit Vitals  /83   Pulse 94   Temp 98.2 °F (36.8 °C)   Resp 18   Ht 5' 6\" (1.676 m)   Wt 181 lb (82.1 kg)   SpO2 98%   BMI 29.21 kg/m²       Constitutional: Awake, alert, and in no acute distress. HEENT: Normocephalic. Atraumatic. Oropharynx is moist and clear. PERRL. EOMI. Sclerae are nonicteric  Heart: Regular rate and rhythm  Lungs: Clear to auscultation bilaterally  Abdomen: Soft and nontender. Bowel sounds are present  Neurological: 1+ symmetrical DTRs in the upper extremities. 1+ symmetrical DTRs in the lower extremities.  Sensation to light touch is intact. Negative Antonio's sign bilaterally. Skin: warm, dry, and intact. Musculoskeletal: Decreased range of motion with left cervical flexion. Positive Spurling's test on the left; negative on the right. Good range of motion of both shoulders. Mild pain with abduction of the left arm. No pain with extension, axial loading, or forward flexion. No pain with internal or external rotation of her hips. Negative straight leg raise bilaterally. Biceps  Triceps Deltoids Wrist Ext Wrist Flex Hand Intrin   Right +4/5 +4/5 +4/5 +4/5 +4/5 +4/5   Left +4/5 +4/5 +4/5 +4/5 +4/5 +4/5      Hip Flex  Quads Hamstrings Ankle DF EHL Ankle PF   Right +4/5 +4/5 +4/5 +4/5 +4/5 +4/5   Left +4/5 +4/5 +4/5 +4/5 +4/5 +4/5     IMAGING:    Cervical spine 2V x-rays from 06/17/2019 were personally reviewed with the patient and demonstrated:  Degenerative disc at C2-3, C5-6 and C7-T1. Mild degenerative discs. Written by Israel Byrnes, as dictated by Robi Sullivan MD.  I, Dr. Robi Sullivan confirm that all documentation is accurate.

## 2019-06-17 NOTE — PATIENT INSTRUCTIONS
Neck Arthritis: Exercises  Your Care Instructions  Here are some examples of typical rehabilitation exercises for your condition. Start each exercise slowly. Ease off the exercise if you start to have pain. Your doctor or physical therapist will tell you when you can start these exercises and which ones will work best for you. How to do the exercises  Neck stretches to the side    1. This stretch works best if you keep your shoulder down as you lean away from it. To help you remember to do this, start by relaxing your shoulders and lightly holding on to your thighs or your chair. 2. Tilt your head toward your shoulder and hold for 15 to 30 seconds. Let the weight of your head stretch your muscles. 3. Repeat 2 to 4 times toward each shoulder. Chin tuck    1. Lie on the floor with a rolled-up towel under your neck. Your head should be touching the floor. 2. Slowly bring your chin toward your chest.  3. Hold for a count of 6, and then relax for up to 10 seconds. 4. Repeat 8 to 12 times. Active cervical rotation    1. Sit in a firm chair, or stand up straight. 2. Keeping your chin level, turn your head to the right, and hold for 15 to 30 seconds. 3. Turn your head to the left and hold for 15 to 30 seconds. 4. Repeat 2 to 4 times to each side. Shoulder blade squeeze    1. While standing, squeeze your shoulder blades together. 2. Do not raise your shoulders up as you are squeezing. 3. Hold for 6 seconds. 4. Repeat 8 to 12 times. Shoulder rolls    1. Sit comfortably with your feet shoulder-width apart. You can also do this exercise standing up. 2. Roll your shoulders up, then back, and then down in a smooth, circular motion. 3. Repeat 2 to 4 times. Follow-up care is a key part of your treatment and safety. Be sure to make and go to all appointments, and call your doctor if you are having problems.  It's also a good idea to know your test results and keep a list of the medicines you take.  Where can you learn more? Go to http://jose guadalupe-juan josé.info/. Enter B398 in the search box to learn more about \"Neck Arthritis: Exercises. \"  Current as of: September 20, 2018  Content Version: 11.9  © 7100-1547 [employer]. Care instructions adapted under license by [employer] (which disclaims liability or warranty for this information). If you have questions about a medical condition or this instruction, always ask your healthcare professional. [place] [address] any warranty or liability for your use of this information.

## 2019-06-17 NOTE — PROGRESS NOTES
Patient denies any s/s of adverse reactions. No swelling/itching noted. Patient directed to check out at . No further action required at this time.

## 2019-06-17 NOTE — PROGRESS NOTES
Toradol 60 mg IM administered to patient's left gluteus. Aspiration technique performed, no blood return noted. Consent signed, Patient tolerated injection well. No redness/swelling/drainage noted to injection site. Will continue to monitor and observe for s/s of adverse reactions.

## 2019-06-24 ENCOUNTER — HOSPITAL ENCOUNTER (OUTPATIENT)
Dept: PHYSICAL THERAPY | Age: 54
Discharge: HOME | End: 2019-06-24
Payer: OTHER GOVERNMENT

## 2019-06-24 PROCEDURE — 97530 THERAPEUTIC ACTIVITIES: CPT

## 2019-06-24 PROCEDURE — 97110 THERAPEUTIC EXERCISES: CPT

## 2019-06-24 PROCEDURE — 97161 PT EVAL LOW COMPLEX 20 MIN: CPT

## 2019-06-24 NOTE — PROGRESS NOTES
[address]  [address] [place] [address], [address] [place] [address], [address] [place], [place] [address] [phone] fax      Plan of Care/ Statement of Necessity for Physical Therapy Services    Patient name: Dearl Members Start of Care: 2019   Referral source: Elizabeth Bello MD : 1965    Medical Diagnosis: Neck pain [M54.2]  Cervical facet joint syndrome [M47.812]  DDD (degenerative disc disease), cervical [M50.30]  Payor: [employer] / Plan: [facility] / Product Type: [employer] /  Onset Date:May 2019    Treatment Diagnosis: decrease tolerance to ADLS and activities due to left sided neck, shoulder and arm pain, decrease ROM Csp and shoulders, STC with trigger points B UT   Prior Hospitalization: see medical history Provider#: [id number]   Medications: Verified on Patient summary List    Comorbidities: HTN, 2011 lumbar laminectomy   Prior Level of Function:all areas of ADLs and activities, no AD use, working, household chores, walking, community activities  The Plan of Care and following information is based on the information from the initial evaluation. Assessment/ key information:  47 YO female diagnosed as above and with S/S consistent with above diagnosis presents to skilled outpatient PT. CCO neck, arm and shoulder pain on the left side. She is right hand dominant and reports insideous onset May 2019. Myofascial STC with trigger points left > right UT and LI and rhomboids. LOM B UE overhead shoulder F and LOM Csp. Previous Treatment/Compliance: spine center, PCP, x-ray, medication. Patient demonstrates the potential to make gains with improved ROM, strength, endurance/activity tolerance, functional FOTO survey score   and all within a reasonable time frame so as to increase their functional independence with ADLs and activities for carryover to  Improved quality of life, tolerance to household chores, work demands and community activities.  Patient requires skilled Physical Therapy so as to monitor their response to and modify their treatment plan accordingly. Patient appears to be an appropriate candidate for skilled outpatient Physical Therapy. PATIENT ADVISED REGARDING NO DRY NEEDLING AT THIS FACILITY AND WE COULD TRANSFER AS SHE WISHES. Evaluation Complexity History MEDIUM  Complexity : 1-2 comorbidities / personal factors will impact the outcome/ POC ; Examination MEDIUM Complexity : 3 Standardized tests and measures addressing body structure, function, activity limitation and / or participation in recreation  ;Presentation LOW Complexity : Stable, uncomplicated  ;Clinical Decision Making MEDIUM Complexity : FOTO score of 26-74  Overall Complexity Rating: LOW   Problem List: pain affecting function, decrease ROM, decrease strength, decrease ADL/ functional abilitiies, decrease activity tolerance, decrease flexibility/ joint mobility and other FOTO 57   Treatment Plan may include any combination of the following: Therapeutic exercise, Therapeutic activities, Neuromuscular re-education, Physical agent/modality, Manual therapy, Patient education, Self Care training and Home safety training  Patient / Family readiness to learn indicated by: asking questions, trying to perform skills and interest  Persons(s) to be included in education: patient (P)  Barriers to Learning/Limitations: None  Patient Goal (s): pain free  Patient Self Reported Health Status: good  Rehabilitation Potential: good    Short Term Goals: To be accomplished in 5 treatments:   1 Patient will have established and be I with HEP to aid with future self management of S/S at discharge   EVAL issued   CURRENT   2 patient will have pain 2-3/10 to aid with increase tolerance to her work demands   EVAL 4   CURRENT  Long Term Goals:  To be accomplished in 12 treatments:   1 patient will have pain 1-2/10 to aid with increase tolerance to her work demands   EVAL 4   CURRENT   2 patient will have FOTO 70 to show significant improvement with turning to look behind her   EVAL 57   CURRENT   3 patient will have overall 50% improvement to aid with increase tolerance to reaching to work overhead for 2 minutes   EVAL moderate difficulty   CURRENT   4 patient will have B shoulder F 145 to aid with overhead reaching and placing things on a shelf    EVAL B 130   CURRENT    Frequency / Duration: Patient to be seen 1-2 times per week for 12 treatments. Patient/ Caregiver education and instruction: Diagnosis, prognosis, self care, activity modification and exercises, ergonomics, dry needling, Csp roll    [x]  Plan of care has been reviewed with PTA Sharyle Freshwater, PT 6/24/2019 5:05 PM  ________________________________________________________________________    I certify that the above Therapy Services are being furnished while the patient is under my care. I agree with the treatment plan and certify that this therapy is necessary.     Physician's Signature:____________Date:_________TIME:________    [employer], Date and Time must be completed for valid certification **      Please sign and return to In [address] [place] [address], [address] [place] [address], [address]  [phone] fax

## 2019-06-24 NOTE — PROGRESS NOTES
PT DAILY TREATMENT NOTE/CERVICAL [id number]    Patient Name: Woo Shelton  Date:2019  : 1965  [x]  Patient  Verified  Payor: MATTHEW / Plan: Cornelius Mock 74 / Product Type:  /    In time:513  Out time:556  Total Treatment Time (min): 43  Visit #: 1 of 12    Medicare/BCBS Only   Total Timed Codes (min):    1:1 Treatment Time:        Treatment Area: Neck pain [M54.2]  Cervical facet joint syndrome [M47.812]  DDD (degenerative disc disease), cervical [M50.30]    SUBJECTIVE  Pain Level (0-10 scale): 4  [x]constant []intermittent []improving []worsening [x]no change since onset  WORSE work demands, computer work, BETTER steroid pack,   Any medication changes, allergies to medications, adverse drug reactions, diagnosis change, or new procedure performed?: [x] No    [] Yes (see summary sheet for update)  Subjective functional status/changes:     PLOF: I all areas of ADLs and activities, no AD use, working, household chores, walking, community activities  Limitations to PLOF:pain  Mechanism of Injury: May 2019, insideous  Current symptoms/Complaints: 47 YO female diagnosed as above and with S/S consistent with above diagnosis presents to skilled outpatient PT. CCO neck, arm and shoulder pain on the left side. She is right hand dominant and reports insideous onset May 2019. Previous Treatment/Compliance: spine center, PCP, x-ray, medication  PMHx/Surgical Hx: HTN, 2011 lumbar laminectomy  Work Hx:full time,   Living Situation: lives in a 2 story house, not alone  Pt Goals: pain free  Barriers: [x]pain []financial []time []transportation []other  Motivation: good  Substance use: []Alcohol []Tobacco []other:   FABQ Score: []low []elevate  Cognition: A & O x 4    Other:    OBJECTIVE/EXAMINATION  Domestic Life: work, household activities, community activities, walking  Activity/Recreational Limitations: pain  Mobility: I  Self Care:  I        Modality rationale:     Eduin Mixon Type Additional Details    [] Estim:  []Unatt       []IFC  []Premod                        []Other:  []w/ice   []w/heat  Position:  Location:    [] Estim: []Att    []TENS instruct  []NMES                    []Other:  []w/US   []w/ice   []w/heat  Position:  Location:    []  Traction: [] Cervical       []Lumbar                       [] Prone          []Supine                       []Intermittent   []Continuous Lbs:  [] before manual  [] after manual    []  Ultrasound: []Continuous   [] Pulsed                           []1MHz   []3MHz Location:  W/cm2:    []  Iontophoresis with dexamethasone         Location: [] Take home patch   [] In clinic    []  Ice     []  heat  []  Ice massage  []  Laser   []  Anodyne Position:  Location:    []  Laser with stim  []  Other: Position:  Location:    []  Vasopneumatic Device Pressure:       [] lo [] med [] hi   Temperature: [] lo [] med [] hi   [] Skin assessment post-treatment:  []intact []redness- no adverse reaction    []redness - adverse reaction:     18 min [x]Eval                  []Re-Eval       10 min Therapeutic Exercise:  [x] See flow sheet :   Rationale: increase ROM, increase strength and improve coordination to improve the patients ability to aid with increase tolerance to ADLs and activities    15 min Therapeutic Activity:  [x]  See flow sheet :   Rationale: increase ROM, increase strength and improve coordination  to improve the patients ability to aid with increase tolerance to ADLS and activities      min Neuromuscular Re-education:  []  See flow sheet :   Rationale:   to improve the patients ability to      min Manual Therapy:     Rationale:  to      min Gait Training:  ___ feet with ___ device on level surfaces with ___ level of assist   Rationale:           With   [x] TE   [x] TA   [] neuro   [] other: Patient Education: [x] Review HEP    [] Progressed/Changed HEP based on:   [] positioning   [] body mechanics   [] transfers   [] heat/ice application    [x] other: Csp roll, anatomy, diagnosis, dry needling      Other Objective/Functional Measures:     Physical Therapy Evaluation Cervical Spine     SUBJECTIVE  Chief Complaint:    Mechanism of injury:    Symptoms  Aggravated by:   [] Bending [] Sitting [] Standing [] Reaching Overhead   [] Moving [] Cough [] Sneeze [] Eating   [] AM  [] PM  Lying:  [] sup   [] pro   [] sidelying   [] Other:     Eased by:    [] Bending [] Sitting [] Standing Lying: [] sup  [] pro  [] sidelying   [] Moving [] AM  [] PM  [] Other:     General Health:  Red Flags Indicated? [] Yes    [] No  [] Yes [] No Recent weight change (If yes, due to dieting? [] Yes  [] No)   [] Yes [] No Persistent cough  [] Yes [] No Unremitting pain at night  [] Yes [] No Dizziness  [] Yes [] No Blurred vision  [] Yes [] No Hands more cold or painful in cold weather  [] Yes [] No Ringing in ears  [] Yes [] No Difficulty swallowing  [] Yes [] No Dysfunction of bowel or bladder  [] Yes [] No Recent illness within past 3 weeks (i.e, cold, flu)  [] Yes [] No Jaw pain    Past History/Treatments:    Diagnostic Tests: [] Lab work [x] X-rays    [] CT [] MRI     [] Other:  Results:    Functional Status  Prior level of function:as above  Present functional limitations:FOTO  What position do you sleep in?:supine , prefers left SL    Headaches: Do you have headaches? [] Yes   [x] No  How often do you get headaches? How long does the headache last?  What aggravates it? What relieves it? Does the headache coincide with any other symptoms (visual disturbances, light sensitivity)? Where is the headache? Does it change locations?   Other:    OBJECTIVE  Posture: [] WNL  Head Position:mild FH and RS  Shoulder/Scapular Position:  C-Kyphosis:  [] increased   [] decreased   C-Lordosis:   [] increased   [] decreased  T-Kyphosis:  [] increased   [] decreased  T-Lordosis:   [] increased   [] decreased     TMJ: [x] N/A [] Abnormal - ROM:   Palpation:    Cervical Retraction: [x] WNL [] Abnormal:    Shoulder/Scapular Screen: [] WNL    [] Abnormal:   B UE overhead shoulder F 130 degrees    Active Movements: [] N/A   [] Too acute   [] Other:  ROM   AROM  degrees % PROM Comments:pain, area   Forward flexion 19     Extension 25     SB right 25     SB left  25     Rotation right 48     Rotation left 55       Thoracic Spine: [x] N/A    [] WNL   [] Other:    PROM:    Palpation:  [] Min  [x] Mod  [] Severe    Location:STC with trigger points  left > right UT, and B rhomboids  [] Min  [] Mod  [] Severe    Location:  [] Min  [] Mod  [] Severe    Location:    Neuro Screen (myotome/dematome/felexes): [] WNL  Myotome Level Muscle Test Myotome Level Muscle Test   C5 Shoulder Adduction - Deltoid C8 Finger Flexors   C6 Wrist Extension T1 Finger Abduction - Interossei   C7 Elbow Extension     Comments:  Upper Limb Tension Tests: [] N/A       Ulnar: [] R    [] L    [] +    [] -       Median: [] R    [] L    [] +    [] -       Radial: [] R    [] L    [] +    [] -    Special Tests:  Cervical:        Vertebral Artery:  [] R    [] L    [] +    [] -       Alar Ligament: [] R    [] L    [] +    [] -       Transverse Lig: [] R    [] L    [] +    [] -       Spurling's:  [] R    [] L    [] +    [] -       Distraction:  [] R    [] L    [] +    [] -   WAR       Compression: [] R    [] L    [] +    [] -   WAR    Thoracic Outlet Tests: [] N/A       Adson's:  [] R    [] L    [] +    [] -       Hyperabduction: [] R    [] L    [] +    [] -       Parminder's:  [] R    [] L    [] +    [] -       Jenelle:  [] R    [] L    [] +    [] -    Diaphragmatic Breathing: [] Normal    [] Abnormal    Muscle Flexibility: [] N/A   Scalenes: [] WNL    [] Tight    [] R    [] L   Upper Trap: [] WNL    [] Tight    [] R    [] L   Levator: [] WNL    [] Tight    [] R    [] L   Pect. Minor: [] WNL    [] Tight    [] R    [] L    Global Muscular Weakness: [] N/A   Lower Trap:   Rhomboids:   Middle Trap:   Serratus Ant:   Ext Rotators:    Other:    Other tests/comments:       Pain Level (0-10 scale) post treatment: 4    ASSESSMENT/Changes in Function: Patient demonstrates the potential to make gains with improved ROM, strength, endurance/activity tolerance, functional FOTO survey score   and all within a reasonable time frame so as to increase their functional independence with ADLs and activities for carryover to  Improved quality of life, tolerance to household chores, work demands and community activities. Patient requires skilled Physical Therapy so as to monitor their response to and modify their treatment plan accordingly. Patient appears to be an appropriate candidate for skilled outpatient Physical Therapy. Patient will continue to benefit from skilled PT services to modify and progress therapeutic interventions, address ROM deficits, address strength deficits, analyze and address soft tissue restrictions, analyze and cue movement patterns, analyze and modify body mechanics/ergonomics, assess and modify postural abnormalities and instruct in home and community integration to attain remaining goals.      [x]  See Plan of Care  []  See progress note/recertification  []  See Discharge Summary         Progress towards goals / Updated goals:        PLAN  [x]  Upgrade activities as tolerated     [x]  Continue plan of care  []  Update interventions per flow sheet       []  Discharge due to:_  []  Other:_      Timothy Nolen, PT 6/24/2019  5:05 PM

## 2019-06-27 ENCOUNTER — HOSPITAL ENCOUNTER (OUTPATIENT)
Dept: PHYSICAL THERAPY | Age: 54
Discharge: HOME | End: 2019-06-27
Payer: OTHER GOVERNMENT

## 2019-06-27 PROCEDURE — 97110 THERAPEUTIC EXERCISES: CPT

## 2019-06-27 PROCEDURE — 97140 MANUAL THERAPY 1/> REGIONS: CPT

## 2019-06-27 NOTE — PROGRESS NOTES
PT DAILY TREATMENT NOTE 10-18    Patient Name: Ritesh Snyder  Date:2019  : 1965  [x]  Patient  Verified  Payor: MATTHEW / Plan: Cornelius Mock 74 / Product Type: Mario Kaur /    In time:5:30  Out time:6:14  Total Treatment Time (min): 44  Visit #: 2 of 12    Medicare/BCBS Only   Total Timed Codes (min):  34 1:1 Treatment Time:  34       Treatment Area: Neck pain [M54.2]  Cervical facet joint syndrome [M47.812]  DDD (degenerative disc disease), cervical [M50.30]    SUBJECTIVE  Pain Level (0-10 scale): 3-4  Any medication changes, allergies to medications, adverse drug reactions, diagnosis change, or new procedure performed?: [x] No    [] Yes (see summary sheet for update)  Subjective functional status/changes:   [] No changes reported  States she is feeling alright, has had a long week     OBJECTIVE    Modality rationale: decrease pain and increase tissue extensibility to improve the patients ability to ease with tolerance to ADLs   Min Type Additional Details    [] Estim:  []Unatt       []IFC  []Premod                        []Other:  []w/ice   []w/heat  Position:  Location:    [] Estim: []Att    []TENS instruct  []NMES                    []Other:  []w/US   []w/ice   []w/heat  Position:  Location:    []  Traction: [] Cervical       []Lumbar                       [] Prone          []Supine                       []Intermittent   []Continuous Lbs:  [] before manual  [] after manual    []  Ultrasound: []Continuous   [] Pulsed                           []1MHz   []3MHz W/cm2:  Location:    []  Iontophoresis with dexamethasone         Location: [] Take home patch   [] In clinic   10 []  Ice     [x]  heat  []  Ice massage  []  Laser   []  Anodyne Position: incline  Location: neck     []  Laser with stim  []  Other:  Position:  Location:    []  Vasopneumatic Device Pressure:       [] lo [] med [] hi   Temperature: [] lo [] med [] hi   [] Skin assessment post-treatment:  []intact []redness- no adverse reaction    []redness - adverse reaction:       24 min Therapeutic Exercise:  [x] See flow sheet :   Rationale: increase strength, improve coordination and increase proprioception to improve the patients ability to perform daily household chores. 10 min Manual Therapy:  DTM left upper trap/ levator scapula   Rationale: decrease pain, increase ROM and increase tissue extensibility to assist with neck AROM while driving        With   [] TE   [] TA   [] neuro   [] other: Patient Education: [x] Review HEP    [] Progressed/Changed HEP based on:   [] positioning   [] body mechanics   [] transfers   [] heat/ice application    [] other:      Other Objective/Functional Measures:   Initiated therex per flow sheet with proper form- no changes in symptoms throughout exercise routine  Reports relief in pain post manual      Pain Level (0-10 scale) post treatment: \"better\"    ASSESSMENT/Changes in Function: Patient continues to show improvements with signs/ symptoms however still demonstrates a decrease in strength, impaired ROM,  and an increase in pain with functional activities. Patient will continue to benefit from skilled PT services to modify and progress therapeutic interventions, address functional mobility deficits, address strength deficits, analyze and cue movement patterns and analyze and modify body mechanics/ergonomics to attain remaining goals. [x]  See Plan of Care  []  See progress note/recertification  []  See Discharge Summary         Progress towards goals / Updated goals:    Short Term Goals: To be accomplished in 5 treatments:               1 Patient will have established and be I with HEP to aid with future self management of S/S at discharge               EVAL issued               CURRENT               2 patient will have pain 2-3/10 to aid with increase tolerance to her work demands               EVAL 4               CURRENT  Long Term Goals:  To be accomplished in 12 treatments: 1 patient will have pain 1-2/10 to aid with increase tolerance to her work demands               EVAL 4               CURRENT               2 patient will have FOTO 70 to show significant improvement with turning to look behind her               EVAL 57               CURRENT               3 patient will have overall 50% improvement to aid with increase tolerance to reaching to work overhead for 2 minutes               EVAL moderate difficulty               CURRENT               4 patient will have B shoulder F 145 to aid with overhead reaching and placing things on a shelf                EVAL B 130               CURRENT        PLAN  [x]  Upgrade activities as tolerated     [x]  Continue plan of care  []  Update interventions per flow sheet       []  Discharge due to:_  []  Other:_      Radha Man, PT 6/27/2019  7:42 AM    Future Appointments   Date Time Provider [place]   6/27/2019  5:30 PM Aurelio Hogan [address] [facility]   7/1/2019  5:00 PM Kaycee Juarez, PT [facility]   7/5/2019  2:00 PM Kaycee Billang, PT [facility]   7/9/2019  5:00 PM Smyrna Simpler, PT [facility]   7/11/2019  5:30 PM Smyrna Simpler, PT [facility]   7/15/2019  5:00 PM Kaycee Billing, PT [facility]   7/18/2019  5:30 PM Smyrna Simpler, PT [facility]   7/23/2019  5:00 PM Smyrna Simpler, PT [facility]   7/25/2019  5:00 PM Smyrna Simpler, PT [facility]   7/29/2019  5:00 PM Kaycee Billing, PT [facility]   7/31/2019  8:45 AM David Doyle  [address]   8/1/2019  5:00 PM Smyrna Simpler, PT [facility]   12/5/2019  8:40 AM Armen Vargas MD [address] [place] [address]

## 2019-07-01 ENCOUNTER — HOSPITAL ENCOUNTER (OUTPATIENT)
Dept: PHYSICAL THERAPY | Age: 54
Discharge: HOME | End: 2019-07-01
Payer: OTHER GOVERNMENT

## 2019-07-01 PROCEDURE — 97110 THERAPEUTIC EXERCISES: CPT

## 2019-07-01 PROCEDURE — 97140 MANUAL THERAPY 1/> REGIONS: CPT

## 2019-07-01 NOTE — PROGRESS NOTES
PT DAILY TREATMENT NOTE 10-18    Patient Name: Kimberly Cordoba  Date:2019  : 1965  [x]  Patient  Verified  Payor: MATTHEW / Plan: Cornelius Mock 74 / Product Type:  /    In time: 5:00   Out time: 5:55  Total Treatment Time (min): 55  Visit #: 3 of 12    Medicare/BCBS Only   Total Timed Codes (min): n/a 1:1 Treatment Time: n/a       Treatment Area: Neck pain [M54.2]  Cervical facet joint syndrome [M47.812]  DDD (degenerative disc disease), cervical [M50.30]    SUBJECTIVE  Pain Level (0-10 scale): 4  Any medication changes, allergies to medications, adverse drug reactions, diagnosis change, or new procedure performed?: [x] No    [] Yes (see summary sheet for update)  Subjective functional status/changes:   [] No changes reported  Pt states she felt better after the last session but the pain came back over the weekend.      OBJECTIVE    Modality rationale: decrease pain and increase tissue extensibility to improve the patients ability to ease with tolerance to ADLs   Min Type Additional Details    [] Estim:  []Unatt       []IFC  []Premod                        []Other:  []w/ice   []w/heat  Position:  Location:    [] Estim: []Att    []TENS instruct  []NMES                    []Other:  []w/US   []w/ice   []w/heat  Position:  Location:    []  Traction: [] Cervical       []Lumbar                       [] Prone          []Supine                       []Intermittent   []Continuous Lbs:  [] before manual  [] after manual    []  Ultrasound: []Continuous   [] Pulsed                           []1MHz   []3MHz W/cm2:  Location:    []  Iontophoresis with dexamethasone         Location: [] Take home patch   [] In clinic   10 []  Ice     [x]  heat  []  Ice massage  []  Laser   []  Anodyne Position: sitting  Location: neck     []  Laser with stim  []  Other:  Position:  Location:    []  Vasopneumatic Device Pressure:       [] lo [] med [] hi   Temperature: [] lo [] med [] hi   [] Skin assessment post-treatment:  []intact []redness- no adverse reaction    []redness - adverse reaction:     32 min Therapeutic Exercise:  [x] See flow sheet :   Rationale: increase strength, improve coordination and increase proprioception to improve the patients ability to perform daily household chores. 13 min Manual Therapy: DTM/TPR left upper trap, levator, supraspinatus and infraspinatus    Rationale: decrease pain, increase ROM and increase tissue extensibility to assist with neck AROM while driving      With   [] TE   [] TA   [] neuro   [] other: Patient Education: [x] Review HEP    [] Progressed/Changed HEP based on:   [] positioning   [] body mechanics   [] transfers   [] heat/ice application    [] other:      Other Objective/Functional Measures: Trigger points noted in the left supraspinatus and infraspinatus with manual interventions. Infraspinatus trigger point referred into the left UE today. Pain Level (0-10 scale) post treatment: 0    ASSESSMENT/Changes in Function: Reported no pain post session and felt more loose per pt report. Reported pain in the left UE with tband Ts and t/s EXT 1/2 foam stretch. Needs cueing to avoid excessive B UT compensation with tband rows. We will plan on continuing therapy at this time to improve ROM, muscle tightness, posture and mobility. Continue POC as tolerated. Patient will continue to benefit from skilled PT services to modify and progress therapeutic interventions, address functional mobility deficits, address ROM deficits, address strength deficits, analyze and address soft tissue restrictions, analyze and cue movement patterns, analyze and modify body mechanics/ergonomics, assess and modify postural abnormalities and instruct in home and community integration to attain remaining goals. []  See Plan of Care  []  See progress note/recertification  []  See Discharge Summary         Progress towards goals / Updated goals:  Short Term Goals:  To be accomplished in 5 treatments:               1 Patient will have established and be I with HEP to aid with future self management of S/S at discharge               EVAL issued               CURRENT               2 patient will have pain 2-3/10 to aid with increase tolerance to her work demands               EVAL 4               CURRENT  Long Term Goals:  To be accomplished in 12 treatments:               1 patient will have pain 1-2/10 to aid with increase tolerance to her work demands               EVAL 4               CURRENT               2 patient will have FOTO 70 to show significant improvement with turning to look behind her               EVAL 57               CURRENT               3 patient will have overall 50% improvement to aid with increase tolerance to reaching to work overhead for 2 minutes               EVAL moderate difficulty               CURRENT               4 patient will have B shoulder F 145 to aid with overhead reaching and placing things on a shelf                EVAL B 130               CURRENT    PLAN  [x]  Upgrade activities as tolerated     [x]  Continue plan of care  [x]  Update interventions per flow sheet       []  Discharge due to:_  []  Other:_      Edy Berry, PT 7/1/2019  5:25 PM    Future Appointments   Date Time Provider Winter [place]gu[place]   7/1/2019  5:00 PM Cozetta Mckees[place], PT [facility] [address]   7/5/2019  2:00 PM Cozetta Mckeesport, PT [facility] [address]   7/9/2019  5:00 PM Jackie Cutter, PT [facility] [address]   7/11/2019  5:30 PM Jackie Cutter, PT [facility] [address]   7/15/2019  5:00 PM Cozetta Mckeesport, PT [facility] [address]   7/18/2019  5:30 PM Jackie Cutter, PT [facility] [address]   7/23/2019  5:00 PM Jackie Cutter, PT [facility] [address]   7/25/2019  5:00 PM Jackie Cutter, PT [facility] [address]   7/29/2019  5:00 PM Cozetta Mckees[place], PT [facility] [address]   7/31/2019  8:45 AM Ceferino Veliz  [address]   8/1/2019  5:00 PM Jackie Cutter, PT [facility] [address]   12/5/2019  8:40 AM Ede Pepper MD [facility]

## 2019-07-05 ENCOUNTER — HOSPITAL ENCOUNTER (OUTPATIENT)
Dept: PHYSICAL THERAPY | Age: 54
Discharge: HOME | End: 2019-07-05
Payer: OTHER GOVERNMENT

## 2019-07-05 PROCEDURE — 97110 THERAPEUTIC EXERCISES: CPT

## 2019-07-05 PROCEDURE — 97140 MANUAL THERAPY 1/> REGIONS: CPT

## 2019-07-05 NOTE — PROGRESS NOTES
PT DAILY TREATMENT NOTE 10-18    Patient Name: Kaley Guajardo  Date:2019  : 1965  [x]  Patient  Verified  Payor:  / Plan: [facility] / Product Type:  /    In time: 2:00   Out time: 2:59  Total Treatment Time (min): 59  Visit #: 4 of 12    Medicare/BCBS Only   Total Timed Codes (min): n/a 1:1 Treatment Time: n/a       Treatment Area: Neck pain [M54.2]  Cervical facet joint syndrome [M47.812]  DDD (degenerative disc disease), cervical [M50.30]    SUBJECTIVE  Pain Level (0-10 scale): 4  Any medication changes, allergies to medications, adverse drug reactions, diagnosis change, or new procedure performed?: [x] No    [] Yes (see summary sheet for update)  Subjective functional status/changes:   [] No changes reported  Pt reports she felt good when she left the last therapy session, then the pain flared up on Monday night into Tuesday, then it started to feel better Wednesday.     OBJECTIVE    Modality rationale: decrease pain and increase tissue extensibility to improve the patients ability to ease with tolerance to ADLs   Min Type Additional Details    [] Estim:  []Unatt       []IFC  []Premod                        []Other:  []w/ice   []w/heat  Position:  Location:    [] Estim: []Att    []TENS instruct  []NMES                    []Other:  []w/US   []w/ice   []w/heat  Position:  Location:    []  Traction: [] Cervical       []Lumbar                       [] Prone          []Supine                       []Intermittent   []Continuous Lbs:  [] before manual  [] after manual    []  Ultrasound: []Continuous   [] Pulsed                           []1MHz   []3MHz W/cm2:  Location:    []  Iontophoresis with dexamethasone         Location: [] Take home patch   [] In clinic   10 []  Ice     [x]  heat  []  Ice massage  []  Laser   []  Anodyne Position: sitting  Location: neck     []  Laser with stim  []  Other:  Position:  Location:    []  Vasopneumatic Device Pressure:       [] lo [] med [] hi   Temperature: [] lo [] med [] hi   [] Skin assessment post-treatment:  []intact []redness- no adverse reaction    []redness - adverse reaction:     34 min Therapeutic Exercise:  [x] See flow sheet :   Rationale: increase strength, improve coordination and increase proprioception to improve the patients ability to perform daily household chores. 15 min Manual Therapy: DTM/TPR left upper trap, levator, scalenes, c/s paraspinals, manual c/s distraction. Rationale: decrease pain, increase ROM and increase tissue extensibility to assist with neck AROM while driving      With   [] TE   [] TA   [] neuro   [] other: Patient Education: [x] Review HEP    [] Progressed/Changed HEP based on:   [] positioning   [] body mechanics   [] transfers   [] heat/ice application    [] other:      Other Objective/Functional Measures: tenderness noted to the left UT/levator/scalenes and c/s paraspinals during manual interventions. Mild improvement in tenderness noted and reported to these regions with manual interventions. Pain Level (0-10 scale) post treatment: 0    ASSESSMENT/Changes in Function: Reported no pain post session. Added towel scalene stretch, c/s retractions and chin tucks to improve muscle tightness and mobility. Pt reports improvements in pain post therapy sessions but reports that this improvement in pain is temporary. Continue POC as tolerated. Patient will continue to benefit from skilled PT services to modify and progress therapeutic interventions, address functional mobility deficits, address ROM deficits, address strength deficits, analyze and address soft tissue restrictions, analyze and cue movement patterns, analyze and modify body mechanics/ergonomics, assess and modify postural abnormalities and instruct in home and community integration to attain remaining goals.      []  See Plan of Care  []  See progress note/recertification  []  See Discharge Summary         Progress towards goals / Updated goals:  Short Term Goals: To be accomplished in 5 treatments:               1 Patient will have established and be I with HEP to aid with future self management of S/S at discharge               EVAL issued               CURRENT               2 patient will have pain 2-3/10 to aid with increase tolerance to her work demands               EVAL 4               CURRENT  Long Term Goals:  To be accomplished in 12 treatments:               1 patient will have pain 1-2/10 to aid with increase tolerance to her work demands               EVAL 4               CURRENT               2 patient will have FOTO 70 to show significant improvement with turning to look behind her               EVAL 57               CURRENT               3 patient will have overall 50% improvement to aid with increase tolerance to reaching to work overhead for 2 minutes               EVAL moderate difficulty               CURRENT               4 patient will have B shoulder F 145 to aid with overhead reaching and placing things on a shelf                EVAL B 130               CURRENT    PLAN  [x]  Upgrade activities as tolerated     [x]  Continue plan of care  [x]  Update interventions per flow sheet       []  Discharge due to:_  []  Other:_      Rafa Meeks, PT 7/5/2019  2:12 PM    Future Appointments   Date Time Provider Winter Arguello   7/5/2019  2:00 PM De Leon Se, PT [facility]   7/9/2019  5:00 PM Yoel Oar, PT [facility]   7/11/2019  5:30 PM Yoel Oar, PT [facility]   7/15/2019  5:00 PM De Leon Se, PT [facility]   7/18/2019  5:30 PM Yoel Oar, PT [facility]   7/23/2019  5:00 PM Yoel Oar, PT [facility]   7/25/2019  5:00 PM Yoel Oar, PT [facility]   7/29/2019  5:00 PM De Leon Se, PT [facility]   7/31/2019  8:45 AM Eddi Morales  [address] 8/1/2019  5:00 PM Yoel Oar, PT [facility]   12/5/2019  8:40 AM Ramin Romo MD [address] [place] [address]

## 2019-07-09 ENCOUNTER — HOSPITAL ENCOUNTER (OUTPATIENT)
Dept: PHYSICAL THERAPY | Age: 54
Discharge: HOME | End: 2019-07-09
Payer: OTHER GOVERNMENT

## 2019-07-09 PROCEDURE — 97530 THERAPEUTIC ACTIVITIES: CPT

## 2019-07-09 PROCEDURE — 97032 APPL MODALITY 1+ESTIM EA 15: CPT

## 2019-07-09 PROCEDURE — 97110 THERAPEUTIC EXERCISES: CPT

## 2019-07-09 NOTE — PROGRESS NOTES
PT DAILY TREATMENT NOTE 10-18    Patient Name: Yao Conklin  Date:2019  : 1965  [x]  Patient  Verified  Payor:  / Plan: [facility] / Product Type:  /    In time:503  Out time:550  Total Treatment Time (min): 41  Visit #: 5 of 12    Medicare/BCBS Only   Total Timed Codes (min):  - 1:1 Treatment Time:  -       Treatment Area: Neck pain [M54.2]  Cervical facet joint syndrome [M47.812]  DDD (degenerative disc disease), cervical [M50.30]    SUBJECTIVE  Pain Level (0-10 scale): 6-7  Any medication changes, allergies to medications, adverse drug reactions, diagnosis change, or new procedure performed?: [x] No    [] Yes (see summary sheet for update)  Subjective functional status/changes:   [] No changes reported  \"I actually feel worse than when I started. I think it is working on the computer today that did it to me. \"    OBJECTIVE    Modality rationale: decrease pain and increase tissue extensibility to improve the patients ability to aid with increase tolerance to ADLs and activities   Min Type Additional Details    [] Estim:  []Unatt       []IFC  []Premod                        []Other:  []w/ice   []w/heat  Position:  Location:   8 [x] Estim: [x]Att    []TENS instruct  []NMES                    [x]Other: LTO []w/US   []w/ice   []w/heat  Position:seated  Location:Left UT/LI/Csp    []  Traction: [] Cervical       []Lumbar                       [] Prone          []Supine                       []Intermittent   []Continuous Lbs:  [] before manual  [] after manual    []  Ultrasound: []Continuous   [] Pulsed                           []1MHz   []3MHz W/cm2:  Location:    []  Iontophoresis with dexamethasone         Location: [] Take home patch   [] In clinic   10 [x]  Ice post  []  heat  []  Ice massage  []  Laser   []  Anodyne Position:seated  Location:Left UT/Csp    []  Laser with stim  []  Other:  Position:  Location:    []  Vasopneumatic Device Pressure:       [] lo [] med [] hi   Temperature: [] lo [] med [] hi   [] Skin assessment post-treatment:  []intact []redness- no adverse reaction    []redness - adverse reaction:       min []Eval                  []Re-Eval       15 min Therapeutic Exercise:  [x] See flow sheet :   Rationale: increase ROM, increase strength and improve coordination to improve the patients ability to aid with increase tolerance to ADLS and activities    8 min Therapeutic Activity:  [x]  See flow sheet :   Rationale: increase ROM, increase strength and improve coordination  to improve the patients ability to aid with increase tolerance to ADLs and activities      min Neuromuscular Re-education:  []  See flow sheet :   Rationale:   to improve the patients ability to      min Manual Therapy:     Rationale: to       min Gait Training:  ___ feet with ___ device on level surfaces with ___ level of assist   Rationale: With   [x] TE   [x] TA   [] neuro   [] other: Patient Education: [x] Review HEP    [] Progressed/Changed HEP based on:   [] positioning   [] body mechanics   [] transfers   [] heat/ice application    [x] other: modified exercises due to increase pain, trial LTO     Other Objective/Functional Measures: VC exercises and technique     Pain Level (0-10 scale) post treatment: <6    ASSESSMENT/Changes in Function: continues with pain left UT and STC left UT. Trigger points Left UT. Trial LTO and modified exercises today     Patient will continue to benefit from skilled PT services to modify and progress therapeutic interventions, address ROM deficits, address strength deficits, analyze and address soft tissue restrictions, analyze and cue movement patterns, analyze and modify body mechanics/ergonomics, assess and modify postural abnormalities and instruct in home and community integration to attain remaining goals.      [x]  See Plan of Care  []  See progress note/recertification  []  See Discharge Summary         Progress towards goals / Updated goals:  Short Term Goals: To be accomplished in 5 treatments:               1 Patient will have established and be I with HEP to aid with future self management of S/S at discharge               EVAL issued               GBSZVUH progressing 7/9/19               2 patient will have pain 2-3/10 to aid with increase tolerance to her work demands               EVAL 4               CURRENT   6-7 7/9/19  Long Term Goals: To be accomplished in 12 treatments:               1 patient will have pain 1-2/10 to aid with increase tolerance to her work demands               EVAL 4               FOJLIYK   6-7 7/9/19               2 patient will have FOTO 70 to show significant improvement with turning to look behind her               KFCF 20               KIQEIYT               3 patient will have overall 50% improvement to aid with increase tolerance to reaching to work overhead for 2 minutes               EVAL moderate difficulty               [id number]               4 patient will have B shoulder F 145 to aid with overhead reaching and placing things on a shelf                EVAL B 130               [id number]        PLAN  [x]  Upgrade activities as tolerated     [x]  Continue plan of care  []  Update interventions per flow sheet       []  Discharge due to:_  []  Other:_      Celina Ward, SHAHID 7/9/2019  5:09 PM    Future Appointments   Date Time Provider Winter Arguello   7/11/2019  5:30 PM Hugh Carrera, PT [facility]   7/15/2019  5:00 PM Nirmal Interiano, PT [facility]   7/18/2019  5:30 PM Hugh Carrera PT [facility]   7/23/2019  5:00 PM Hugh Carrera PT [facility]   7/25/2019  5:00 PM Hugh Carrera PT [facility]   7/29/2019  5:00 PM Nirmal Interiano, PT [facility]   7/31/2019  8:45 AM Kacey Bryant  [address]   8/1/2019  5:00 PM Hugh Carrera, PT [facility]   12/5/2019  8:40 AM Gretta Hanley MD [address] [place] [address]

## 2019-07-11 ENCOUNTER — HOSPITAL ENCOUNTER (OUTPATIENT)
Dept: PHYSICAL THERAPY | Age: 54
Discharge: HOME | End: 2019-07-11
Payer: OTHER GOVERNMENT

## 2019-07-11 PROCEDURE — 97032 APPL MODALITY 1+ESTIM EA 15: CPT

## 2019-07-11 PROCEDURE — 97140 MANUAL THERAPY 1/> REGIONS: CPT

## 2019-07-11 PROCEDURE — 97110 THERAPEUTIC EXERCISES: CPT

## 2019-07-11 PROCEDURE — 97530 THERAPEUTIC ACTIVITIES: CPT

## 2019-07-11 NOTE — PROGRESS NOTES
PT DAILY TREATMENT NOTE 10-18    Patient Name: Vannie Severs  Date:2019  : 1965  [x]  Patient  Verified  Payor:  / Plan: [facility] / Product Type: Julio Jason /    In time:526  Out time:623  Total Treatment Time (min): 50  Visit #: 6 of 12    Medicare/BCBS Only   Total Timed Codes (min):  - 1:1 Treatment Time:  -       Treatment Area: Neck pain [M54.2]  Cervical facet joint syndrome [M47.812]  DDD (degenerative disc disease), cervical [M50.30]    SUBJECTIVE  Pain Level (0-10 scale): 4-6  Any medication changes, allergies to medications, adverse drug reactions, diagnosis change, or new procedure performed?: [x] No    [] Yes (see summary sheet for update)  Subjective functional status/changes:   [] No changes reported  \"I think the Ice lasted longer and the laser helped also but it still tightened up. \"    OBJECTIVE    Modality rationale: decrease inflammation, decrease pain and increase tissue extensibility to improve the patients ability to aid with increase tolerance to ADLS and activities   Min Type Additional Details   8 [x] Estim:  [x]Unatt       []IFC  []Premod                        [x]Other: LTO []w/ice   []w/heat  Position:seated  Location:Left UT/Csp paraspinals    [] Estim: []Att    []TENS instruct  []NMES                    []Other:  []w/US   []w/ice   []w/heat  Position:  Location:    []  Traction: [] Cervical       []Lumbar                       [] Prone          []Supine                       []Intermittent   []Continuous Lbs:  [] before manual  [] after manual    []  Ultrasound: []Continuous   [] Pulsed                           []1MHz   []3MHz W/cm2:  Location:    []  Iontophoresis with dexamethasone         Location: [] Take home patch   [] In clinic   10 [x]  Ice post    []  heat  []  Ice massage  []  Laser   []  Anodyne Position:seated  Location:Left UT/Csp    []  Laser with stim  []  Other:  Position:  Location:    []  Vasopneumatic Device Pressure:       [] lo [] med [] hi   Temperature: [] lo [] med [] hi   [] Skin assessment post-treatment:  []intact []redness- no adverse reaction    []redness - adverse reaction:       min []Eval                  []Re-Eval       15 min Therapeutic Exercise:  [x] See flow sheet :   Rationale: increase ROM, increase strength and improve coordination to improve the patients ability to aid with increase tolerance to ADLs and activities    9 min Therapeutic Activity:  [x]  See flow sheet :   Rationale: increase ROM, increase strength and improve coordination  to improve the patients ability to aid with increase tolerance to ADLs and activities      min Neuromuscular Re-education:  []  See flow sheet :   Rationale:   to improve the patients ability to     8 min Manual Therapy:  STM TPR DTM Left UT/ Csp paraspinals   Rationale: decrease pain, increase ROM, increase tissue extensibility and decrease trigger points to aid with increase tolerance to ADLS and activities     min Gait Training:  ___ feet with ___ device on level surfaces with ___ level of assist   Rationale: With   [x] TE   [x] TA   [] neuro   [] other: Patient Education: [x] Review HEP    [x] Progressed/Changed HEP based on:   [] positioning   [] body mechanics   [] transfers   [] heat/ice application    [] other:      Other Objective/Functional Measures: VC exercises and tech     Pain Level (0-10 scale) post treatment: <4    ASSESSMENT/Changes in Function: tolerated well and notes benefit of ice and LTO last session although UT tightened up again on her. Patient will continue to benefit from skilled PT services to modify and progress therapeutic interventions, address ROM deficits, address strength deficits, analyze and address soft tissue restrictions, analyze and cue movement patterns, analyze and modify body mechanics/ergonomics, assess and modify postural abnormalities and instruct in home and community integration to attain remaining goals.      [x]  See Plan of Care  []  See progress note/recertification  []  See Discharge Summary         Progress towards goals / Updated goals:   Short Term Goals: To be accomplished in 5 treatments:               1 Patient will have established and be I with HEP to aid with future self management of S/S at discharge               EVAL issued               CEFOCIH progressing 7/9/19 7/11/19               2 patient will have pain 2-3/10 to aid with increase tolerance to her work demands               EVAL 4               JHGEGBU   6-7  7/9/19   4-5 7/11/19  Long Term Goals: To be accomplished in 12 treatments:               1 patient will have pain 1-2/10 to aid with increase tolerance to her work demands               EVAL 4               CURRENT   6-7 7/9/19   4-5 7/11/19               2 patient will have FOTO 70 to show significant improvement with turning to look behind her               RBVC 08               GUTEUHF               3 patient will have overall 50% improvement to aid with increase tolerance to reaching to work overhead for 2 minutes               EVAL moderate difficulty               MKWPWRY               4 patient will have B shoulder F 145 to aid with overhead reaching and placing things on a shelf                EVAL B 130               BSKNDDU        PLAN  [x]  Upgrade activities as tolerated     [x]  Continue plan of care  []  Update interventions per flow sheet       []  Discharge due to:_  []  Other:_      Ce Flores PT 7/11/2019  5:28 PM    Future Appointments   Date Time Provider Winter Arguello   7/11/2019  5:30 PM Deleta Larger, PT [facility]   7/15/2019  5:00 PM Collin Ortega, PT [facility]   7/18/2019  5:30 PM Deleta Larger, PT [facility]   7/23/2019  5:00 PM Deleta Larger, PT [facility]   7/25/2019  5:00 PM Deleta Larger, PT [facility]   7/29/2019  5:00 PM Collin Ortega, PT [facility]   7/31/2019  8:45 AM Vernon Hannah  [address] 8/1/2019  5:00 PM Deleta Justin, PT [facility]   12/5/2019  8:40 AM Ismael Blackwell MD [address] [place] [address]

## 2019-07-18 ENCOUNTER — HOSPITAL ENCOUNTER (OUTPATIENT)
Dept: PHYSICAL THERAPY | Age: 54
Discharge: HOME | End: 2019-07-18
Payer: OTHER GOVERNMENT

## 2019-07-18 PROCEDURE — 97032 APPL MODALITY 1+ESTIM EA 15: CPT

## 2019-07-18 PROCEDURE — 97530 THERAPEUTIC ACTIVITIES: CPT

## 2019-07-18 PROCEDURE — 97110 THERAPEUTIC EXERCISES: CPT

## 2019-07-18 PROCEDURE — 97140 MANUAL THERAPY 1/> REGIONS: CPT

## 2019-07-18 NOTE — PROGRESS NOTES
PT DAILY TREATMENT NOTE 10-18    Patient Name: Jones Gaucher  Date:2019  : 1965  [x]  Patient  Verified  Payor: [facility] / Plan: [facility] / Product Type: Carissa Cocking /    In time:521  Out time:621  Total Treatment Time (min): 60  Visit #: 8 of 12    Medicare/BCBS Only   Total Timed Codes (min):   1:1 Treatment Time:         Treatment Area: Neck pain [M54.2]  Cervical facet joint syndrome [M47.812]  DDD (degenerative disc disease), cervical [M50.30]    SUBJECTIVE  Pain Level (0-10 scale): 4  Any medication changes, allergies to medications, adverse drug reactions, diagnosis change, or new procedure performed?: [x] No    [] Yes (see summary sheet for update)  Subjective functional status/changes:   [] No changes reported  \"It has always been in the shoulder and upper arm but today it is more noticeable there.  \"     OBJECTIVE    Modality rationale: decrease inflammation, decrease pain and increase tissue extensibility to improve the patients ability to aid with incresae tolerance to ADLS and activities   Min Type Additional Details    [] Estim:  []Unatt       []IFC  []Premod                        []Other:  []w/ice   []w/heat  Position:  Location:   8 [x] Estim: [x]Att    []TENS instruct  []NMES                    [x]Other: LTO []w/US   []w/ice   []w/heat  Position:seated  Location:left shoulder /UT    []  Traction: [] Cervical       []Lumbar                       [] Prone          []Supine                       []Intermittent   []Continuous Lbs:  [] before manual  [] after manual    []  Ultrasound: []Continuous   [] Pulsed                           []1MHz   []3MHz W/cm2:  Location:    []  Iontophoresis with dexamethasone         Location: [] Take home patch   [] In clinic   10 [x]  Ice post     []  heat  []  Ice massage  []  Laser   []  Anodyne Position:seated  Location:left UT/Shoulder    []  Laser with stim  []  Other:  Position:  Location:    []  Vasopneumatic Device Pressure: [] lo [] med [] hi   Temperature: [] lo [] med [] hi   [] Skin assessment post-treatment:  []intact []redness- no adverse reaction    []redness - adverse reaction:       min []Eval                  []Re-Eval       15 min Therapeutic Exercise:  [x] See flow sheet :   Rationale: increase ROM, increase strength and improve coordination to improve the patients ability to aid with increase tolerance to ADLs and activities    15 min Therapeutic Activity:  [x]  See flow sheet :   Rationale: increase ROM, increase strength and improve coordination  to improve the patients ability to aid with increase tolerance to ADLs and activities      min Neuromuscular Re-education:  []  See flow sheet :   Rationale:   to improve the patients ability to   12 min Manual Therapy:  STM DTM TPR left UT/shoulder/teres trigger point   Rationale: decrease pain, increase ROM, increase tissue extensibility and decrease trigger points to aid with increase tolerance to ADLs and activities     min Gait Training:  ___ feet with ___ device on level surfaces with ___ level of assist   Rationale: With   [x] TE   [x] TA   [] neuro   [] other: Patient Education: [x] Review HEP    [x] Progressed/Changed HEP based on:   [] positioning   [] body mechanics   [] transfers   [] heat/ice application    [] other:      Other Objective/Functional Measures: VC exercises and tech  Residual STC TTP Left UT, upper arm/shoulder     Pain Level (0-10 scale) post treatment: <4    ASSESSMENT/Changes in Function: tolerated well    Patient will continue to benefit from skilled PT services to modify and progress therapeutic interventions, address ROM deficits, address strength deficits, analyze and address soft tissue restrictions, analyze and cue movement patterns, analyze and modify body mechanics/ergonomics, assess and modify postural abnormalities, address imbalance/dizziness and instruct in home and community integration to attain remaining goals.      [x]  See Plan of Care  []  See progress note/recertification  []  See Discharge Summary         Progress towards goals / Updated goals:   Short Term Goals: To be accomplished in 5 treatments:               1 Patient will have established and be I with HEP to aid with future self management of S/S at discharge               EVAL issued               KLKGKMO progressing 7/9/19 7/11/19 7/18/19               2 patient will have pain 2-3/10 to aid with increase tolerance to her work demands               EVAL 4               GMWAXBQ   6-7  7/9/19   4-5 7/11/19   4 7/18/19  Long Term Goals: To be accomplished in 12 treatments:               1 patient will have pain 1-2/10 to aid with increase tolerance to her work demands               EVAL 4               OCXYKKK   6-7 7/9/19   4-5 7/11/19  4 7/18/19               2 patient will have FOTO 70 to show significant improvement with turning to look behind her               JEZD 63               SDAKOXM               3 patient will have overall 50% improvement to aid with increase tolerance to reaching to work overhead for 2 minutes               EVAL moderate difficulty               NCPNWBV               4 patient will have B shoulder F 145 to aid with overhead reaching and placing things on a shelf                EVAL B 130               FOYOURJ    PLAN  [x]  Upgrade activities as tolerated     [x]  Continue plan of care  []  Update interventions per flow sheet       []  Discharge due to:_  []  Other:_      Devang Salazar PT 7/18/2019  5:29 PM    Future Appointments   Date Time Provider Winter Arguello   7/18/2019  5:30 PM Cheryl Christopher PT [facility]   7/23/2019  5:00 PM Cheryl Christopher PT [facility]   7/25/2019  5:00 PM Cheryl Christopher PT [facility]   7/29/2019  5:00 PM Ivon Sabillon PT [facility]   7/31/2019  8:45 AM Elena Hammer  [address]   8/1/2019  5:00 PM Cheryl Christopher PT [facility]   12/5/2019  8:40 AM Tisha Bella MD [address] [place] [address]

## 2019-07-23 ENCOUNTER — HOSPITAL ENCOUNTER (OUTPATIENT)
Dept: PHYSICAL THERAPY | Age: 54
Discharge: HOME | End: 2019-07-23
Payer: OTHER GOVERNMENT

## 2019-07-23 PROCEDURE — 97032 APPL MODALITY 1+ESTIM EA 15: CPT

## 2019-07-23 PROCEDURE — 97110 THERAPEUTIC EXERCISES: CPT

## 2019-07-23 PROCEDURE — 97140 MANUAL THERAPY 1/> REGIONS: CPT

## 2019-07-23 PROCEDURE — 97530 THERAPEUTIC ACTIVITIES: CPT

## 2019-07-23 NOTE — PROGRESS NOTES
PT DAILY TREATMENT NOTE 10-18    Patient Name: Bashir July  Date:2019  : 1965  [x]  Patient  Verified  Payor:  / Plan: [facility] REGION / Product Type:  /    In time:450  Out time:548  Total Treatment Time (min): 56  Visit #: 9 of 12    Medicare/BCBS Only   Total Timed Codes (min):  - 1:1 Treatment Time:  -       Treatment Area: Neck pain [M54.2]  Cervical facet joint syndrome [M47.812]  DDD (degenerative disc disease), cervical [M50.30]    SUBJECTIVE  Pain Level (0-10 scale): 4  Any medication changes, allergies to medications, adverse drug reactions, diagnosis change, or new procedure performed?: [x] No    [] Yes (see summary sheet for update)  Subjective functional status/changes:   [] No changes reported  \"it is still more so on the left side of my neck and shoulder. \"    OBJECTIVE    Modality rationale: decrease pain and increase tissue extensibility to improve the patients ability to aid with increase tolerance to ADLS and activities   Min Type Additional Details    [] Estim:  []Unatt       []IFC  []Premod                        []Other:  []w/ice   []w/heat  Position:  Location:   10 [x] Estim: [x]Att    []TENS instruct  []NMES                    [x]Other: LTO []w/US   []w/ice   []w/heat  Position:seated  Location:Left UT/LI and shoulder    []  Traction: [] Cervical       []Lumbar                       [] Prone          []Supine                       []Intermittent   []Continuous Lbs:  [] before manual  [] after manual    []  Ultrasound: []Continuous   [] Pulsed                           []1MHz   []3MHz W/cm2:  Location:    []  Iontophoresis with dexamethasone         Location: [] Take home patch   [] In clinic   10 [x]  Ice  post   []  heat  []  Ice massage  []  Laser   []  Anodyne Position:seated  Location:left UT/shoulder    []  Laser with stim  []  Other:  Position:  Location:    []  Vasopneumatic Device Pressure:       [] lo [] med [] hi   Temperature: [] lo [] med [] hi   [] Skin assessment post-treatment:  []intact []redness- no adverse reaction    []redness - adverse reaction:       min []Eval                  []Re-Eval       15 min Therapeutic Exercise:  [x] See flow sheet :   Rationale: increase ROM, increase strength and improve coordination to improve the patients ability to aid with increase tolerance to ADLS and activities    11 min Therapeutic Activity:  [x]  See flow sheet :   Rationale: increase ROM, increase strength and improve coordination  to improve the patients ability to aid with increase tolerance to ADLs and activities      min Neuromuscular Re-education:  []  See flow sheet :   Rationale:   to improve the patients ability to     10 min Manual Therapy:  STM DTM TPR Left UT/LI   Rationale: decrease pain, increase ROM, increase tissue extensibility and decrease trigger points to aid with increase tolerance to ADLs and activities     min Gait Training:  ___ feet with ___ device on level surfaces with ___ level of assist   Rationale: With   [x] TE   [x] TA   [] neuro   [] other: Patient Education: [x] Review HEP    [] Progressed/Changed HEP based on:   [] positioning   [] body mechanics   [] transfers   [] heat/ice application    [x] other: FOTO     Other Objective/Functional Measures: VC exercises and tech FOTO 66     Pain Level (0-10 scale) post treatment: <4    ASSESSMENT/Changes in Function: tolerated well. Patient will continue to benefit from skilled PT services to modify and progress therapeutic interventions, address ROM deficits, address strength deficits, analyze and address soft tissue restrictions, analyze and cue movement patterns, analyze and modify body mechanics/ergonomics, assess and modify postural abnormalities and instruct in home and community integration to attain remaining goals.      [x]  See Plan of Care  [x]  See progress note/recertification  []  See Discharge Summary         Progress towards goals / Updated goals:   Short Term Goals: To be accomplished in 5 treatments:               1 Patient will have established and be I with HEP to aid with future self management of S/S at discharge               EVAL issued               KRELSXG reports compliance at home 7/23/19               2 patient will have pain 2-3/10 to aid with increase tolerance to her work demands               EVAL 4               SARKUUV   4 7/23/19  Long Term Goals: To be accomplished in 12 treatments:               1 patient will have pain 1-2/10 to aid with increase tolerance to her work demands               EVAL 4               VVYGJTM  4 7/23/19               2 patient will have FOTO 70 to show significant improvement with turning to look behind her               YXON 60               BJLNTFQ 48 7/23/19               3 patient will have overall 50% improvement to aid with increase tolerance to reaching to work overhead for 2 minutes               EVAL moderate difficulty               ZOXGXQK               4 patient will have B shoulder F 145 to aid with overhead reaching and placing things on a shelf                EVAL B 130               JXKYNIR  PLAN  [x]  Upgrade activities as tolerated     [x]  Continue plan of care  []  Update interventions per flow sheet       []  Discharge due to:_  [x]  Other:_  Send 30 day note to MD Celina Ward, PT 7/23/2019  5:03 PM    Future Appointments   Date Time Provider Winter Arguello   7/25/2019  5:00 PM Hugh Carrera, PT [facility]   7/29/2019  5:00 PM Nirmal Interiano, PT [facility]   7/31/2019  8:45 AM Kacey Bryant  [address]   8/1/2019  5:00 PM Hugh Carrera, PT [facility]   12/5/2019  8:40 AM Gretta Hanley MD [address] [facility]

## 2019-07-23 NOTE — PROGRESS NOTES
[facility] [address] [facility]  [address] [facility], [facility], [facility] [address]  [phone] fax    Physician Update  [x] Progress Note  [] Discharge Summary  Patient name: Whit Aguilera Start of Care: 19   Referral source: Flora Mathias MD : 1965   Medical/Treatment Diagnosis: Neck pain [M54.2]  Cervical facet joint syndrome [M47.812]  DDD (degenerative disc disease), cervical [M50.30]  Payor: [employer] / Plan: [facility] [employer] [facility] REGION / Product Type: Dg Cates /  Onset Date:May 2019     Prior Hospitalization: see medical history Provider#: [id number]   Medications: Verified on Patient Summary List    Comorbidities: HTN, 2011 lumbar laminectomy   Prior Level of Function:all areas of ADLs and activities, no AD use, working, household chores, walking, community activities  Visits from Start of Care: 9    Missed Visits: 0    Status at Evaluation/Last Progress Note: evaluation and plan of care. Progress towards Goals: pain today is 4/10, FOTO improved to 66, residual TTP and STC left UT/Levator and to the left shoulder. She has residual STC TTP left UT. She has exercises for her home exercise program. Patient demonstrates the potential to make further gains with improving ROM, strength, endurance/activity tolerance, functional FOTO survey score   and all within a reasonable time frame so as to further increase their functional independence with ADLs and activities for carryover to  Improved quality of life, tolerance to household chores and community activities and work demands. Patient requires skilled Physical Therapy so as to monitor their response to and modify their treatment plan accordingly. Patient appears to be an appropriate candidate for skilled outpatient Physical Therapy.   MD Follow up is     Goals: to be achieved in 12 total treatments     1 patient will have pain 1-2/10 to aid with increase tolerance to her work demands               PN 4               FSTDSBT                 2 patient will have FOTO 70 to show significant improvement with turning to look behind her               PN 77               CURRENT                 3 patient will have overall 50% improvement to aid with increase tolerance to reaching to work overhead for 2 minutes               PN NA               PDIRZKV               4 patient will have B shoulder F 145 to aid with overhead reaching and placing things on a shelf                PN NA               PWQFXDB  ASSESSMENT/RECOMMENDATIONS:  [x]Continue therapy per initial plan/protocol at a frequency of  2-3 x per week for 12 total treatments. []Continue therapy with the following recommended changes:_____________________      _____________________________________________________________________  []Discontinue therapy progressing towards or have reached established goals  []Discontinue therapy due to lack of appreciable progress towards goals  []Discontinue therapy due to lack of attendance or compliance  []Await Physician's recommendations/decisions regarding therapy  []Other:________________________________________________________________    Thank you for this referral. Tahir Pitts, PT 7/23/2019 5:07 PM  NOTE TO PHYSICIAN:  [address] AND   FAX TO [facility]: [phone]  If you are unable to process this request in 24 hours please contact our office: [phone]    ? I have read the above report and request that my patient continue as recommended. ? I have read the above report and request that my patient continue therapy with the following changes/special instructions:_____________________________________  ? I have read the above report and request that my patient be discharged from therapy.     [de-identified] Signature:____________Date:_________TIME:________    [facility], Date and Time must be completed for valid certification **

## 2019-07-25 ENCOUNTER — HOSPITAL ENCOUNTER (OUTPATIENT)
Dept: PHYSICAL THERAPY | Age: 54
Discharge: HOME | End: 2019-07-25
Payer: OTHER GOVERNMENT

## 2019-07-25 PROCEDURE — 97032 APPL MODALITY 1+ESTIM EA 15: CPT

## 2019-07-25 PROCEDURE — 97530 THERAPEUTIC ACTIVITIES: CPT

## 2019-07-25 PROCEDURE — 97140 MANUAL THERAPY 1/> REGIONS: CPT

## 2019-07-25 PROCEDURE — 97110 THERAPEUTIC EXERCISES: CPT

## 2019-07-25 NOTE — PROGRESS NOTES
PT DAILY TREATMENT NOTE 10-18    Patient Name: Jose Senior  Date:2019  : 1965  [x]  Patient  Verified  Payor:  / Plan: [facility] REGION / Product Type:  /    In time:455  Out time:546  Total Treatment Time (min): 51  Visit #: 10 of 12    Medicare/BCBS Only   Total Timed Codes (min):   1:1 Treatment Time:         Treatment Area: Neck pain [M54.2]  Cervical facet joint syndrome [M47.812]  DDD (degenerative disc disease), cervical [M50.30]    SUBJECTIVE  Pain Level (0-10 scale): 3-4  Any medication changes, allergies to medications, adverse drug reactions, diagnosis change, or new procedure performed?: [x] No    [] Yes (see summary sheet for update)  Subjective functional status/changes:   [] No changes reported  \" I am doing alright today, about a 3-4. \"    OBJECTIVE    Modality rationale: decrease pain and increase tissue extensibility to improve the patients ability to aid with increase tolerance to ADLs and activities   Min Type Additional Details    [] Estim:  []Unatt       []IFC  []Premod                        []Other:  []w/ice   []w/heat  Position:  Location:   8 [x] Estim: [x]Att    []TENS instruct  []NMES                    [x]Other:LTO  []w/US   []w/ice   []w/heat  Position:seated  Location:left UT and shoulder    []  Traction: [] Cervical       []Lumbar                       [] Prone          []Supine                       []Intermittent   []Continuous Lbs:  [] before manual  [] after manual    []  Ultrasound: []Continuous   [] Pulsed                           []1MHz   []3MHz W/cm2:  Location:    []  Iontophoresis with dexamethasone         Location: [] Take home patch   [] In clinic   10 [x]  Ice  post    []  heat  []  Ice massage  []  Laser   []  Anodyne Position:seated  Location:Left UT/Csp/Shoulder    []  Laser with stim  []  Other:  Position:  Location:    []  Vasopneumatic Device Pressure:       [] lo [] med [] hi   Temperature: [] lo [] med [] hi   [] Skin assessment post-treatment:  []intact []redness- no adverse reaction    []redness - adverse reaction:       min []Eval                  []Re-Eval       13 min Therapeutic Exercise:  [x] See flow sheet :   Rationale: increase ROM, increase strength and improve coordination to improve the patients ability to aid with increase tolerance to ADLs and activities    10 min Therapeutic Activity:  [x]  See flow sheet :   Rationale: increase ROM, increase strength and improve coordination  to improve the patients ability to aid with increase tolerance to ADLs and activities      min Neuromuscular Re-education:  []  See flow sheet :   Rationale:   to improve the patients ability to     10 min Manual Therapy:  STM DTM TPR Left UT/LI/Csp   Rationale: decrease pain, increase ROM, increase tissue extensibility and decrease trigger points to aid with increase tolerance to ADLs and activities     min Gait Training:  ___ feet with ___ device on level surfaces with ___ level of assist   Rationale: With   [x] TE   [x] TA   [] neuro   [] other: Patient Education: [x] Review HEP    [x] Progressed/Changed HEP based on:   [] positioning   [] body mechanics   [] transfers   [] heat/ice application    [] other:      Other Objective/Functional Measures: VC exercises and technique     Pain Level (0-10 scale) post treatment: 3    ASSESSMENT/Changes in Function: tolerated well. Patient will continue to benefit from skilled PT services to modify and progress therapeutic interventions, address ROM deficits, address strength deficits, analyze and address soft tissue restrictions, analyze and cue movement patterns, analyze and modify body mechanics/ergonomics, assess and modify postural abnormalities and instruct in home and community integration to attain remaining goals.      [x]  See Plan of Care  []  See progress note/recertification  []  See Discharge Summary         Progress towards goals / Updated goals:   1 patient will have pain 1-2/10 to aid with increase tolerance to her work demands               PN 4               LVPBRRC   3-4 7/25/19               2 patient will have FOTO 70 to show significant improvement with turning to look behind her               ZQ 20               URDPLQX                 3 patient will have overall 50% improvement to aid with increase tolerance to reaching to work overhead for 2 minutes               PN NA               KXVQHRQ               4 patient will have B shoulder F 145 to aid with overhead reaching and placing things on a shelf                PN NA               PLCNOTZ    PLAN  [x]  Upgrade activities as tolerated     [x]  Continue plan of care  []  Update interventions per flow sheet       []  Discharge due to:_  []  Other:_      Zuleika Rosas, PT 7/25/2019  5:03 PM    Future Appointments   Date Time Provider Winter Arguello   7/29/2019  5:00 PM Gerry Velasquez, PT [facility]   7/31/2019  8:45 AM Madhu Lloyd  [address]    8/1/2019  5:00 PM Izaiah Cordero, PT [facility]   12/5/2019  8:40 AM Ricco Villagomez MD [address] [facility]

## 2019-07-29 ENCOUNTER — HOSPITAL ENCOUNTER (OUTPATIENT)
Dept: PHYSICAL THERAPY | Age: 54
Discharge: HOME | End: 2019-07-29
Payer: OTHER GOVERNMENT

## 2019-07-29 PROCEDURE — 97140 MANUAL THERAPY 1/> REGIONS: CPT

## 2019-07-29 PROCEDURE — 97110 THERAPEUTIC EXERCISES: CPT

## 2019-07-29 PROCEDURE — 97032 APPL MODALITY 1+ESTIM EA 15: CPT

## 2019-07-29 NOTE — PROGRESS NOTES
PT DAILY TREATMENT NOTE 10-18    Patient Name: Escobar Giang  Date:2019  : 1965  [x]  Patient  Verified  Payor:  / Plan: [facility] / Product Type:  /    In time: 5:05     Out time: 6:05  Total Treatment Time (min): 60  Visit #: 11 of 12    Medicare/BCBS Only   Total Timed Codes (min):  n/a  1:1 Treatment Time:  n/a       Treatment Area: Neck pain [M54.2]  Cervical facet joint syndrome [M47.812]  DDD (degenerative disc disease), cervical [M50.30]    SUBJECTIVE  Pain Level (0-10 scale): 4  Any medication changes, allergies to medications, adverse drug reactions, diagnosis change, or new procedure performed?: [x] No    [] Yes (see summary sheet for update)  Subjective functional status/changes:   [] No changes reported  Pt reports she feels therapy is helping but has increased pain with work tasks. She states she does change positions often and her computer is in an ergonomic correct position.      OBJECTIVE    Modality rationale: decrease pain and increase tissue extensibility to improve the patients ability to aid with increase tolerance to ADLs and activities   Min Type Additional Details    [] Estim:  []Unatt       []IFC  []Premod                        []Other:  []w/ice   []w/heat  Position:  Location:   8 [x] Estim: [x]Att    []TENS instruct  []NMES                    [x]Other:LTO  []w/US   []w/ice   []w/heat  Position:sitting  Location:left UT and shoulder    []  Traction: [] Cervical       []Lumbar                       [] Prone          []Supine                       []Intermittent   []Continuous Lbs:  [] before manual  [] after manual    []  Ultrasound: []Continuous   [] Pulsed                           []1MHz   []3MHz W/cm2:  Location:    []  Iontophoresis with dexamethasone         Location: [] Take home patch   [] In clinic   10 [x]  Ice    []  heat  []  Ice massage  []  Laser   []  Anodyne Position:sitting  Location:Left UT/Csp/Shoulder    []  Laser with stim  []  Other:  Position:  Location:    []  Vasopneumatic Device Pressure:       [] lo [] med [] hi   Temperature: [] lo [] med [] hi   [] Skin assessment post-treatment:  []intact []redness- no adverse reaction    []redness - adverse reaction:     32 min Therapeutic Exercise:  [x] See flow sheet :   Rationale: increase ROM, increase strength and improve coordination to improve the patients ability to aid with increase tolerance to ADLs and activities    10 min Manual Therapy: DTM/TPR Left UT/surapinatius/levator, grade 2-3 t/s PA mobs   Rationale: decrease pain, increase ROM, increase tissue extensibility and decrease trigger points to aid with increase tolerance to ADLs and activities          With   [x] TE   [x] TA   [] neuro   [] other: Patient Education: [x] Review HEP    [x] Progressed/Changed HEP based on:   [] positioning   [] body mechanics   [] transfers   [] heat/ice application    [] other:      Other Objective/Functional Measures: Decreased t/s mobility and increased left UT/surapinatius/levator tightness noted with manual interventions. Pain Level (0-10 scale) post treatment: 2    ASSESSMENT/Changes in Function: Pt reported improvement in pain post session. Pt continues to have limitations with muscle tightness and impaired t/s mobility. Educated pt that she can perform t/s EXT with a rolled up towel at work to improve t/s mobility. Added open books to improve T/s mobility as well. Pt states she follows up with the MD on Wednesday. Continue POC as tolerated. Patient will continue to benefit from skilled PT services to modify and progress therapeutic interventions, address ROM deficits, address strength deficits, analyze and address soft tissue restrictions, analyze and cue movement patterns, analyze and modify body mechanics/ergonomics, assess and modify postural abnormalities and instruct in home and community integration to attain remaining goals.      [x]  See Plan of Care  []  See progress note/recertification  []  See Discharge Summary         Progress towards goals / Updated goals:   1 patient will have pain 1-2/10 to aid with increase tolerance to her work demands               PN 4               CURRENT   3-4 7/25/19               2 patient will have FOTO 70 to show significant improvement with turning to look behind her               ZU 88               [id number]                 3 patient will have overall 50% improvement to aid with increase tolerance to reaching to work overhead for 2 minutes               PN NA               ISAFXGX               4 patient will have B shoulder F 145 to aid with overhead reaching and placing things on a shelf                PN NA               [id number]    PLAN  [x]  Upgrade activities as tolerated     [x]  Continue plan of care  [x]  Update interventions per flow sheet       []  Discharge due to:_  []  Other:_      Oswaldo Esteves, PT 7/29/2019  5:56 PM    Future Appointments   Date Time Provider Winter Arguello   7/29/2019  5:00 PM Ce Rae, PT [facility]   7/31/2019  8:45 AM Naveen Davila  [address]    8/1/2019  5:00 PM Marivel Venegas, PT [facility]   12/5/2019  8:40 AM Lawanda Carvajal MD [address] [facility]

## 2019-07-31 ENCOUNTER — OFFICE VISIT (OUTPATIENT)
Dept: ORTHOPEDIC SURGERY | Age: 54
End: 2019-07-31

## 2019-07-31 VITALS
WEIGHT: 183 LBS | SYSTOLIC BLOOD PRESSURE: 138 MMHG | HEIGHT: 66 IN | HEART RATE: 90 BPM | BODY MASS INDEX: 29.41 KG/M2 | OXYGEN SATURATION: 100 % | TEMPERATURE: 98 F | DIASTOLIC BLOOD PRESSURE: 74 MMHG

## 2019-07-31 DIAGNOSIS — M79.18 MYOFASCIAL PAIN: ICD-10-CM

## 2019-07-31 DIAGNOSIS — M54.12 LEFT CERVICAL RADICULOPATHY: Primary | ICD-10-CM

## 2019-07-31 DIAGNOSIS — M50.30 DDD (DEGENERATIVE DISC DISEASE), CERVICAL: ICD-10-CM

## 2019-07-31 DIAGNOSIS — M47.812 CERVICAL FACET JOINT SYNDROME: ICD-10-CM

## 2019-07-31 RX ORDER — METHYLPREDNISOLONE 4 MG/1
TABLET ORAL
Qty: 1 DOSE PACK | Refills: 0 | Status: SHIPPED | OUTPATIENT
Start: 2019-07-31 | End: 2019-08-19 | Stop reason: ALTCHOICE

## 2019-07-31 NOTE — PATIENT INSTRUCTIONS
Neck Arthritis: Exercises  Introduction  Here are some examples of exercises for you to try. The exercises may be suggested for a condition or for rehabilitation. Start each exercise slowly. Ease off the exercises if you start to have pain. You will be told when to start these exercises and which ones will work best for you. How to do the exercises  Neck stretches to the side    1. This stretch works best if you keep your shoulder down as you lean away from it. To help you remember to do this, start by relaxing your shoulders and lightly holding on to your thighs or your chair. 2. Tilt your head toward your shoulder and hold for 15 to 30 seconds. Let the weight of your head stretch your muscles. 3. Repeat 2 to 4 times toward each shoulder. Chin tuck    1. Lie on the floor with a rolled-up towel under your neck. Your head should be touching the floor. 2. Slowly bring your chin toward your chest.  3. Hold for a count of 6, and then relax for up to 10 seconds. 4. Repeat 8 to 12 times. Active cervical rotation    1. Sit in a firm chair, or stand up straight. 2. Keeping your chin level, turn your head to the right, and hold for 15 to 30 seconds. 3. Turn your head to the left and hold for 15 to 30 seconds. 4. Repeat 2 to 4 times to each side. Shoulder blade squeeze    1. While standing, squeeze your shoulder blades together. 2. Do not raise your shoulders up as you are squeezing. 3. Hold for 6 seconds. 4. Repeat 8 to 12 times. Shoulder rolls    1. Sit comfortably with your feet shoulder-width apart. You can also do this exercise standing up. 2. Roll your shoulders up, then back, and then down in a smooth, circular motion. 3. Repeat 2 to 4 times. Follow-up care is a key part of your treatment and safety. Be sure to make and go to all appointments, and call your doctor if you are having problems.  It's also a good idea to know your test results and keep a list of the medicines you take.  Where can you learn more? Go to http://jose guadalupe-juan josé.info/. Enter F086 in the search box to learn more about \"Neck Arthritis: Exercises. \"  Current as of: September 20, 2018  Content Version: 12.1  © 7247-2000 Healthwise, Incorporated. Care instructions adapted under license by [employer] (which disclaims liability or warranty for this information). If you have questions about a medical condition or this instruction, always ask your healthcare professional. [place] [address] any warranty or liability for your use of this information.

## 2019-07-31 NOTE — LETTER
7/31/19 Patient: Jose Fossa YOB: 1965 Date of Visit: 7/31/2019 Duncan Latham MD 
[address] [facility] [address] [facility] [place] [address] VIA In Basket Dear Duncan Latham MD, Thank you for referring Ms. Sissy Mcghee to [facility] for evaluation. My notes for this consultation are attached. If you have questions, please do not hesitate to call me. I look forward to following your patient along with you. Sincerely, Italo Vidal MD

## 2019-07-31 NOTE — PROGRESS NOTES
[facility]  [address] [place], [facility] [address]  Phone: [phone]  Fax: [phone]    Pt's YOB: 1965    ASSESSMENT   Diagnoses and all orders for this visit:    1. Left cervical radiculopathy  -     MRI CERV SPINE WO CONT; Future    2. DDD (degenerative disc disease), cervical  -     MRI CERV SPINE WO CONT; Future    3. Myofascial pain  -     MRI CERV SPINE WO CONT; Future    4. Cervical facet joint syndrome  -     MRI CERV SPINE WO CONT; Future  -     methylPREDNISolone (MEDROL DOSEPACK) 4 mg tablet; Per dose pack instructions         IMPRESSION AND PLAN:  Nessa Acevedo is a 48 y.o. female with history of cervical pain. Pt complains of pain in the neck that extends to the left shoulder and radiates down the arm. She notes minimal improvement with physical therapy and temporary relief with a Medrol Dosepak. 1) Pt was given information on cervical arthritis exercises. 2) Pt was given information on how to use a TheraCane. 3) A cervical MRI was ordered. She has persistent cervical pain with left radicular symptoms and has completed physical therapy. 4) Pt was prescribed a Medrol Dosepak. 4) Ms. Kedar Avalos has a reminder for a \"due or due soon\" health maintenance. I have asked that she contact her primary care provider, Princess Rodrigues MD, for follow-up on this health maintenance. 5)  demonstrated consistency with prescribing. Follow-up and Dispositions    · Return in about 3 weeks (around 8/21/2019) for Medication follow up, Diagnostic Test follow up. HISTORY OF PRESENT ILLNESS:  Nessa Acevedo is a 48 y.o. female with history of cervical pain and presents to the office today for follow up. Pt complains of pain in the neck that extends to the left shoulder and radiates down the arm. She notes minimal improvement with physical therapy since her last office visit.  Pt notes that she was unable to receive dry needling since it was not offered at the [address] [place] [address],[address] location. She notes temporary relief with a Medrol Dosepak and minimal relief with a Toradol injection. 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 Pt at this time desires to proceed with a cervical MRI.     Pain Scale: 4/10    PCP: Jessica Bentley MD     Past Medical History:   Diagnosis Date    Facet arthropathy     Heart murmur 2006    High cholesterol     Iritis     Low back pain     Nausea     Spinal stenosis         Social History     Socioeconomic History    Marital status:      Spouse name: Not on file    Number of children: Not on file    Years of education: Not on file    Highest education level: Not on file   Occupational History    Not on file   Social Needs    Financial resource strain: Not on file    Food insecurity:     Worry: Not on file     Inability: Not on file    Transportation needs:     Medical: Not on file     Non-medical: Not on file   Tobacco Use    Smoking status: Never Smoker    Smokeless tobacco: Never Used   Substance and Sexual Activity    Alcohol use: No    Drug use: No    Sexual activity: Not on file     Comment:    Lifestyle    Physical activity:     Days per week: Not on file     Minutes per session: Not on file    Stress: Not on file   Relationships    Social connections:     Talks on phone: Not on file     Gets together: Not on file     Attends Restorationist service: Not on file     Active member of club or organization: Not on file     Attends meetings of clubs or organizations: Not on file     Relationship status: Not on file    Intimate partner violence:     Fear of current or ex partner: Not on file     Emotionally abused: Not on file     Physically abused: Not on file     Forced sexual activity: Not on file   Other Topics Concern   [address] Service Not Asked    Blood Transfusions Not Asked    Caffeine Concern Yes     Comment: 4 oz three times a week    Occupational Exposure Not Asked    Hobby Hazards Not Asked    Sleep Concern Not Asked    Stress Concern Not Asked    Weight Concern Not Asked    Special Diet Not Asked    Back Care Not Asked    Exercise Yes     Comment: walks 30 minutes 3 to 4 times a week or 5,000 steps a day     Bike Helmet Not Asked    Seat Belt Yes    Self-Exams Not Asked   Social History Narrative    Not on file       Current Outpatient Medications   Medication Sig Dispense Refill    methylPREDNISolone (MEDROL DOSEPACK) 4 mg tablet Per dose pack instructions 1 Dose Pack 0    hydroCHLOROthiazide (HYDRODIURIL) 25 mg tablet Take 1 Tab by mouth daily. 30 Tab 6    simvastatin (ZOCOR) 40 mg tablet take 1 tablet by mouth every evening 30 Tab 6    medroxyprogesterone (DEPO-PROVERA) 150 mg/mL Syrg 150 mg by IntraMUSCular route once.  MULTIVITAMINS (MULTI-VITAMIN PO) Take  by mouth.  methylPREDNISolone (MEDROL DOSEPACK) 4 mg tablet Per dose pack instructions 1 Dose Pack 0    diclofenac EC (VOLTAREN) 75 mg EC tablet Take 1 Tab by mouth two (2) times daily (with meals). 60 Tab 1    metaxalone (SKELAXIN) 800 mg tablet Take 1 Tab by mouth two (2) times daily as needed (muscle spasms). 60 Tab 1       No Known Allergies      REVIEW OF SYSTEMS    Constitutional: Negative for fever, chills, or weight change. Respiratory: Negative for cough or shortness of breath. Cardiovascular: Negative for chest pain or palpitations. Gastrointestinal: Negative for acid reflux, change in bowel habits, or constipation. Genitourinary: Negative for dysuria and flank pain. Musculoskeletal: Positive for cervical pain. Skin: Negative for rash. Neurological: Negative for headaches, dizziness, or numbness. Endo/Heme/Allergies: Negative for increased bruising. Psychiatric/Behavioral: Positve for difficulty with sleep.       PHYSICAL EXAMINATION  Visit Vitals  /74 (BP 1 Location: Right arm, BP Patient Position: Sitting)   Pulse 90   Temp 98 °F (36.7 °C) (Oral)   Ht 5' 6\" (1.676 m)   Wt 183 lb (83 kg)   SpO2 100%   BMI 29.54 kg/m²       Constitutional: Awake, alert, and in no acute distress. Neurological: 1+ symmetrical DTRs in the upper extremities. 1+ symmetrical DTRs in the lower extremities. Sensation to light touch is intact. Negative Gannon's sign bilaterally. Skin: warm, dry, and intact. Musculoskeletal: Decreased range of motion with side to side cervical flexion. Positive Spurling's test on the left. Biceps  Triceps Deltoids Wrist Ext Wrist Flex Hand Intrin   Right +4/5 +4/5 +4/5 +4/5 +4/5 +4/5   Left +4/5 +4/5 +4/5 +4/5 +4/5 +4/5     IMAGING:    Cervical spine 2V x-rays from 06/17/2019 were personally reviewed with the patient and demonstrated:  Degenerative disc at C2-3, C5-6 and C7-T1. Mild degenerative discs. Written by Megha Erazo, as dictated by Sana Mcqueen MD.  I, Dr. Sana Mcqueen confirm that all documentation is accurate.

## 2019-08-01 ENCOUNTER — APPOINTMENT (OUTPATIENT)
Dept: PHYSICAL THERAPY | Age: 54
End: 2019-08-01

## 2019-08-10 ENCOUNTER — HOSPITAL ENCOUNTER (OUTPATIENT)
Age: 54
Discharge: HOME | End: 2019-08-10
Attending: PHYSICAL MEDICINE & REHABILITATION
Payer: OTHER GOVERNMENT

## 2019-08-10 DIAGNOSIS — M50.30 DDD (DEGENERATIVE DISC DISEASE), CERVICAL: ICD-10-CM

## 2019-08-10 DIAGNOSIS — M54.12 LEFT CERVICAL RADICULOPATHY: ICD-10-CM

## 2019-08-10 DIAGNOSIS — M79.18 MYOFASCIAL PAIN: ICD-10-CM

## 2019-08-10 DIAGNOSIS — M47.812 CERVICAL FACET JOINT SYNDROME: ICD-10-CM

## 2019-08-10 PROCEDURE — 72141 MRI NECK SPINE W/O DYE: CPT

## 2019-08-19 ENCOUNTER — OFFICE VISIT (OUTPATIENT)
Dept: ORTHOPEDIC SURGERY | Age: 54
End: 2019-08-19

## 2019-08-19 VITALS
WEIGHT: 182.4 LBS | SYSTOLIC BLOOD PRESSURE: 128 MMHG | OXYGEN SATURATION: 99 % | DIASTOLIC BLOOD PRESSURE: 72 MMHG | TEMPERATURE: 98.4 F | RESPIRATION RATE: 14 BRPM | HEART RATE: 102 BPM | BODY MASS INDEX: 29.32 KG/M2 | HEIGHT: 66 IN

## 2019-08-19 DIAGNOSIS — M79.2 RADICULAR PAIN IN LEFT ARM: ICD-10-CM

## 2019-08-19 DIAGNOSIS — M62.838 MUSCLE SPASM: ICD-10-CM

## 2019-08-19 DIAGNOSIS — M48.02 CERVICAL SPINAL STENOSIS: Primary | ICD-10-CM

## 2019-08-19 DIAGNOSIS — M79.18 MYOFASCIAL PAIN: ICD-10-CM

## 2019-08-19 NOTE — LETTER
8/21/19 Patient: Dante Rao YOB: 1965 Date of Visit: 8/19/2019 Talmage Kanner, MD 
[address] [facility] [address] [place] [address] VIA In Basket Dear Talmage Kanner, MD, Thank you for referring Ms. Sissy Mcghee to [facility] for evaluation. My notes for this consultation are attached. If you have questions, please do not hesitate to call me. I look forward to following your patient along with you. Sincerely, Arturo Walker MD

## 2019-08-19 NOTE — PATIENT INSTRUCTIONS
Neck Arthritis: Exercises  Introduction  Here are some examples of exercises for you to try. The exercises may be suggested for a condition or for rehabilitation. Start each exercise slowly. Ease off the exercises if you start to have pain. You will be told when to start these exercises and which ones will work best for you. How to do the exercises  Neck stretches to the side    1. This stretch works best if you keep your shoulder down as you lean away from it. To help you remember to do this, start by relaxing your shoulders and lightly holding on to your thighs or your chair. 2. Tilt your head toward your shoulder and hold for 15 to 30 seconds. Let the weight of your head stretch your muscles. 3. Repeat 2 to 4 times toward each shoulder. Chin tuck    1. Lie on the floor with a rolled-up towel under your neck. Your head should be touching the floor. 2. Slowly bring your chin toward your chest.  3. Hold for a count of 6, and then relax for up to 10 seconds. 4. Repeat 8 to 12 times. Active cervical rotation    1. Sit in a firm chair, or stand up straight. 2. Keeping your chin level, turn your head to the right, and hold for 15 to 30 seconds. 3. Turn your head to the left and hold for 15 to 30 seconds. 4. Repeat 2 to 4 times to each side. Shoulder blade squeeze    1. While standing, squeeze your shoulder blades together. 2. Do not raise your shoulders up as you are squeezing. 3. Hold for 6 seconds. 4. Repeat 8 to 12 times. Shoulder rolls    1. Sit comfortably with your feet shoulder-width apart. You can also do this exercise standing up. 2. Roll your shoulders up, then back, and then down in a smooth, circular motion. 3. Repeat 2 to 4 times. Follow-up care is a key part of your treatment and safety. Be sure to make and go to all appointments, and call your doctor if you are having problems.  It's also a good idea to know your test results and keep a list of the medicines you take.  Where can you learn more? Go to http://jose guadalupe-juan josé.info/. Enter Y782 in the search box to learn more about \"Neck Arthritis: Exercises. \"  Current as of: September 20, 2018  Content Version: 12.1  © 3764-7090 Healthwise, Incorporated. Care instructions adapted under license by [employer] (which disclaims liability or warranty for this information). If you have questions about a medical condition or this instruction, always ask your healthcare professional. [address] any warranty or liability for your use of this information.

## 2019-08-19 NOTE — PROGRESS NOTES
[facility]  [address], [facility] [address]  Phone: [phone]  Fax: [phone]    Pt's YOB: 1965    ASSESSMENT   Diagnoses and all orders for this visit:    1. Cervical spinal stenosis  -     REFERRAL TO SPINE SURGERY    2. Muscle spasm  -     REFERRAL TO SPINE SURGERY    3. Myofascial pain  -     REFERRAL TO SPINE SURGERY    4. Radicular pain in left arm  -     REFERRAL TO SPINE SURGERY         IMPRESSION AND PLAN:  Debo Parham is a 48 y.o. female with history of cervical pain. Pt complains of tightness in the neck that extends to the left shoulder but she denies any weakness or pain radiating down the arms at this time. She takes Aleve as needed and notes minimal relief with previous physical therapy. 1) Pt was given information on cervical arthritis exercises. 2) She was referred to Dr. Yazan Levy for surgical evaluation regarding her cervical spinal stenosis. 3) Ms. Lucille Chavez has a reminder for a \"due or due soon\" health maintenance. I have asked that she contact her primary care provider, Susie Gibbs MD, for follow-up on this health maintenance. 4)  demonstrated consistency with prescribing. 5) Pt declines any other medications. Follow-up and Dispositions    · Return in about 2 weeks (around 9/2/2019) for surgical evaluation with Dr. Yazan Levy. HISTORY OF PRESENT ILLNESS:  Debo Parham is a 48 y.o. female with history of cervical pain and presents to the office today for follow up. Pt complains of tightness in the neck that extends to the left shoulder but she denies any weakness or pain radiating down the arms at this time. Pt also denies any loss of manual dexterity or difficulty swallowing. Pt reports she takes 2 tabs of Aleve as needed and notes minimal relief with a Medrol Dosepak.  She notes minimal improvement when she previously attended physical therapy with traction but denies ever undergoing dry needling. Pt at this time desires to proceed with referral to Dr. Brandon Marrero for surgical evaluation.     Pain Scale: 4/10    PCP: Jean Barraza MD     Past Medical History:   Diagnosis Date    Facet arthropathy     Heart murmur 2006    High cholesterol     Iritis     Low back pain     Nausea     Spinal stenosis         Social History     Socioeconomic History    Marital status:      Spouse name: Not on file    Number of children: Not on file    Years of education: Not on file    Highest education level: Not on file   Occupational History    Not on file   Social Needs    Financial resource strain: Not on file    Food insecurity:     Worry: Not on file     Inability: Not on file    Transportation needs:     Medical: Not on file     Non-medical: Not on file   Tobacco Use    Smoking status: Never Smoker    Smokeless tobacco: Never Used   Substance and Sexual Activity    Alcohol use: No    Drug use: No    Sexual activity: Not on file     Comment:    Lifestyle    Physical activity:     Days per week: Not on file     Minutes per session: Not on file    Stress: Not on file   Relationships    Social connections:     Talks on phone: Not on file     Gets together: Not on file     Attends Moravian service: Not on file     Active member of club or organization: Not on file     Attends meetings of clubs or organizations: Not on file     Relationship status: Not on file    Intimate partner violence:     Fear of current or ex partner: Not on file     Emotionally abused: Not on file     Physically abused: Not on file     Forced sexual activity: Not on file   Other Topics Concern     Service Not Asked    Blood Transfusions Not Asked    Caffeine Concern Yes     Comment: 4 oz three times a week    Occupational Exposure Not Asked   [place] Hazards Not Asked    Sleep Concern Not Asked    Stress Concern Not Asked    Weight Concern Not Asked    Special Diet Not Asked  Back Care Not Asked    Exercise Yes     Comment: walks 30 minutes 3 to 4 times a week or 5,000 steps a day     Bike Helmet Not Asked    Seat Belt Yes    Self-Exams Not Asked   Social History Narrative    Not on file       Current Outpatient Medications   Medication Sig Dispense Refill    diclofenac EC (VOLTAREN) 75 mg EC tablet Take 1 Tab by mouth two (2) times daily (with meals). 60 Tab 1    metaxalone (SKELAXIN) 800 mg tablet Take 1 Tab by mouth two (2) times daily as needed (muscle spasms). 60 Tab 1    hydroCHLOROthiazide (HYDRODIURIL) 25 mg tablet Take 1 Tab by mouth daily. 30 Tab 6    simvastatin (ZOCOR) 40 mg tablet take 1 tablet by mouth every evening 30 Tab 6    medroxyprogesterone (DEPO-PROVERA) 150 mg/mL Syrg 150 mg by IntraMUSCular route once.  MULTIVITAMINS (MULTI-VITAMIN PO) Take  by mouth. No Known Allergies      REVIEW OF SYSTEMS    Constitutional: Negative for fever, chills, or weight change. Respiratory: Negative for cough or shortness of breath. Cardiovascular: Negative for chest pain or palpitations. Gastrointestinal: Negative for acid reflux, change in bowel habits, or constipation. Genitourinary: Negative for dysuria and flank pain. Musculoskeletal: Positive for cervical and left arm pain. Skin: Negative for rash. Neurological: Negative for headaches, dizziness, or numbness. Endo/Heme/Allergies: Negative for increased bruising. Psychiatric/Behavioral: Negative for difficulty with sleep. PHYSICAL EXAMINATION  Visit Vitals  /72   Pulse (!) 102   Temp 98.4 °F (36.9 °C) (Oral)   Resp 14   Ht 5' 6\" (1.676 m)   Wt 182 lb 6.4 oz (82.7 kg)   SpO2 99%   BMI 29.44 kg/m²       Constitutional: Awake, alert, and in no acute distress. Neurological: 1+ symmetrical DTRs in the upper extremities. 1+ symmetrical DTRs in the lower extremities. Sensation to light touch is intact. Negative Gannon's sign bilaterally. Skin: warm, dry, and intact. Musculoskeletal: Tight across the upper trapezius bilaterally. Decreased range of motion with side to side cervical flexion. No pain with extension, axial loading, or forward flexion. No pain with internal or external rotation of her hips. Negative straight leg raise bilaterally. Biceps  Triceps Deltoids Wrist Ext Wrist Flex Hand Intrin   Right +4/5 +4/5 +4/5 +4/5 +4/5 +4/5   Left +4/5 +4/5 +4/5 +4/5 +4/5 +4/5      Hip Flex  Quads Hamstrings Ankle DF EHL Ankle PF   Right +4/5 +4/5 +4/5 +4/5 +4/5 +4/5   Left +4/5 +4/5 +4/5 +4/5 +4/5 +4/5     IMAGING:    Cervical MRI from 08/10/2019 was personally reviewed with the patient and demonstrated:  FINDINGS:     Multinodular thyroid is partially evaluated. Reference ultrasound better  evaluates the thyroid nodules.     S-shaped scoliosis partially evaluated. Loss of cervical lordosis.     Multilevel degenerative vertebral body height loss and disc height loss present.     C2-C3: Broad-based bulge and left uncal vertebral joint hypertrophy. Left facet  arthropathy. Minimal effacement of anterior thecal sac. Mild to moderate left  neuroforaminal narrowing. Patent right neural foramen.     C3-C4: Broad-based disc osteophyte complex. Right neuroforamen patent. Moderate  spinal canal narrowing. Moderate left neuroforaminal narrowing. Mass effect on  spinal cord.     C4-C5: Broad-based disc osteophyte complex and uncovertebral joint hypertrophy. Mild bilateral neuroforaminal narrowing and mild spinal canal narrowing. Mass  effect on spinal cord.     C5-C6: Broad-based disc osteophyte complex and bilateral uncovertebral joint  hypertrophy. Moderate severe bilateral neuroforaminal narrowing, left greater  than right. Moderate severe spinal canal narrowing with mass effect on spinal  cord. Suggestive of developing abnormal spinal cord signal.     C6-C7: Broad-based disc osteophyte complex and bilateral uncovertebral joint  hypertrophy.  Moderate bilateral neuroforaminal narrowing. Minimal spinal canal  narrowing.     C7-T1: Bilateral uncovertebral joint hypertrophy causing mild to moderate  neuroforaminal narrowing. Spinal canal patent.     Nonspecific lymph nodes are noted bilaterally. These are seen in the cervical  chains predominantly but also in the left supraclavicular chain and in the  posterior soft tissues series 6 image 46.     IMPRESSION:     Multilevel degenerative findings causing various degrees of spinal canal and  neuroforaminal narrowing. There is suggestion of developing abnormal spinal cord  signal in the mid to lower cervical spine.     Alignment as discussed.     Multinodular thyroid better characterized on reference ultrasound.     Nonspecific lymph nodes. Correlate clinically. Written by Tana Us, as dictated by Margy Flores MD.  I, Dr. Margy Flores confirm that all documentation is accurate.

## 2019-08-28 NOTE — PROGRESS NOTES
[facility] [address], [address] [place] [address], [address]  [phone] fax    Discharge Summary    Patient name: Channing Henderson     Start of Care: 19  Referral source: Carlos Craig MD    : 1965  Medical/Treatment Diagnosis: Neck pain [M54.2]  Cervical facet joint syndrome [M47.812]  DDD (degenerative disc disease), cervical [M50.30]  Payor: [employer] / Plan: [facility]  [facility] / Product Type: Romeo Brandon /        Onset Date:May 2019  Prior Hospitalization: see medical history   Provider#: [id number]  Comorbidities: HTN, 2011 lumbar laminectomy   Prior Level of Function:all areas of ADLs and activities, no AD use, working, household chores, walking, community activitiesMedications: Verified on Patient Summary List    Visits from [facility] Care: 11    Missed Visits: 0  Reporting Period : 19 to 19      Summary of Care:patient seen for 11 skilled sessions with HEP issued and residual pain 4/10 and TTP with trigger points and STC neck and UT regions. She was cooperative and compliant. She was to have an MRI and she requested DC at this time. Thank you.    Goal:Patient will have established and be I with HEP to aid with future self management of S/S at discharge  Status at last note/certification:eval  Status at discharge: met    Goal:patient will have pain 2-3/10 to aid with increase tolerance to her work demands      Status at last note/certification:eval  Status at discharge: not met, 4    Goal: patient will have FOTO 70 to show significant improvement with turning to look behind her  Status at last note/certification:eval  Status at discharge: not met, 77    Goal:patient will have overall 50% improvement to aid with increase tolerance to reaching to work overhead for 2 minutes  Status at last note/certification:eval  Status at discharge: not met, progressing      ASSESSMENT/RECOMMENDATIONS:  []Discontinue therapy progressing towards or have reached established goals  []Discontinue therapy due to lack of appreciable progress towards goals  []Discontinue therapy due to lack of attendance or compliance  [x]Other:self DC due to pending MRI    Thank you for this referral.     Tahir Pitts, PT 8/28/2019 12:36 PM

## 2019-09-10 ENCOUNTER — OFFICE VISIT (OUTPATIENT)
Dept: ORTHOPEDIC SURGERY | Age: 54
End: 2019-09-10

## 2019-09-10 VITALS
TEMPERATURE: 98.3 F | BODY MASS INDEX: 29.44 KG/M2 | HEIGHT: 66 IN | SYSTOLIC BLOOD PRESSURE: 143 MMHG | RESPIRATION RATE: 16 BRPM | DIASTOLIC BLOOD PRESSURE: 83 MMHG | HEART RATE: 100 BPM

## 2019-09-10 DIAGNOSIS — M50.20 HNP (HERNIATED NUCLEUS PULPOSUS), CERVICAL: Primary | ICD-10-CM

## 2019-09-10 DIAGNOSIS — M48.02 CERVICAL SPINAL STENOSIS: ICD-10-CM

## 2019-09-10 NOTE — PROGRESS NOTES
[address]  [address]  [place]  Phone: [phone]  Fax: [phone]  INITIAL CONSULTATION  Patient: Jose Senior                MRN: [id number]       SSN: [id number]  YOB: 1965        AGE: 48 y.o. SEX: female  Body mass index is 29.44 kg/m². PCP: Mirella Collins MD  09/10/19    Chief Complaint   Patient presents with    Neck Pain     SC         HISTORY OF PRESENT ILLNESS, RADIOGRAPHS, and PLAN:         HISTORY OF PRESENT ILLNESS:  Ms. Luz Reina is seen today at the request of Dr. Sung Arreaga. I took care of Ms. Luz Reina for some back problems some years ago. Ms. Luz Reina returns today. She is a 51-year-old female, otherwise healthy, who works as an . She presents with progressive pain in her neck with some shoulder stiffness. It has been unresponsive to conservative treatments. MRIs have disclosed her to have central disc herniation with mild to moderate stenosis at C3-4 and spondylitic change with stenosis significant with gliotic changes to the cord at C5-6 classically stenotic. There is no instability. PHYSICAL EXAMINATION:  The patient on physical exam has remarkably normal strength, sensation, and reflexes. She denies myelopathic symptoms. There is no gait disturbance or loss of fine motor control, just axial neck pain. ASSESSMENT/PLAN: I discussed the matter at length with her and the controversies with regards to surgery, the issues with regards to her pain. She has significant stenosis at two levels with an intermediate level that his benign in appearance.   My recommendation to her, given her pain and this signal change involved in the cord, would be a cervical decompression and fusion at the C5-6 level to resolve the stenosis there that is global where there is clear facet disease and anterior and posterior column issues and a cervical disc arthroplasty at C3-4 where there is simply an anterior column disc issue with no facet issue. That would allow us to leave the C4-5 segment alone and not create a junctional issue for it, which would likely end up necessitating a three-level fusion for her with a markedly different prognosis, especially with neck pain. I discussed with her the risks, benefits, complications, and alternatives to surgery. The patient wishes to proceed. We will proceed with surgery once the appropriate approvals and clearances take place. cc: Nate Gutierrez. Padma Owusu M.D. Past Medical History:   Diagnosis Date    Facet arthropathy     Heart murmur 2006    High cholesterol     Iritis     Low back pain     Nausea     Spinal stenosis        Family History   Problem Relation Age of Onset    Diabetes Mother     Heart Disease Father         CAD       Current Outpatient Medications   Medication Sig Dispense Refill    diclofenac EC (VOLTAREN) 75 mg EC tablet Take 1 Tab by mouth two (2) times daily (with meals). 60 Tab 1    metaxalone (SKELAXIN) 800 mg tablet Take 1 Tab by mouth two (2) times daily as needed (muscle spasms). 60 Tab 1    hydroCHLOROthiazide (HYDRODIURIL) 25 mg tablet Take 1 Tab by mouth daily. 30 Tab 6    simvastatin (ZOCOR) 40 mg tablet take 1 tablet by mouth every evening 30 Tab 6    medroxyprogesterone (DEPO-PROVERA) 150 mg/mL Syrg 150 mg by IntraMUSCular route once.  MULTIVITAMINS (MULTI-VITAMIN PO) Take  by mouth.          No Known Allergies    Past Surgical History:   Procedure Laterality Date    HX ORTHOPAEDIC  2009    back surgery       Past Medical History:   Diagnosis Date    Facet arthropathy     Heart murmur 2006    High cholesterol     Iritis     Low back pain     Nausea     Spinal stenosis        Social History     Socioeconomic History    Marital status:      Spouse name: Not on file    Number of children: Not on file    Years of education: Not on file    Highest education level: Not on file   Occupational History    Not on file   Social Needs    Financial resource strain: Not on file    Food insecurity:     Worry: Not on file     Inability: Not on file    Transportation needs:     Medical: Not on file     Non-medical: Not on file   Tobacco Use    Smoking status: Never Smoker    Smokeless tobacco: Never Used   Substance and Sexual Activity    Alcohol use: No    Drug use: No    Sexual activity: Not on file     Comment:    Lifestyle    Physical activity:     Days per week: Not on file     Minutes per session: Not on file    Stress: Not on file   Relationships    Social connections:     Talks on phone: Not on file     Gets together: Not on file     Attends Oriental orthodox service: Not on file     Active member of club or organization: Not on file     Attends meetings of clubs or organizations: Not on file     Relationship status: Not on file    Intimate partner violence:     Fear of current or ex partner: Not on file     Emotionally abused: Not on file     Physically abused: Not on file     Forced sexual activity: Not on file   Other Topics Concern     Service Not Asked    Blood Transfusions Not Asked    Caffeine Concern Yes     Comment: 4 oz three times a week    Occupational Exposure Not Asked   [place] Hazards Not Asked    Sleep Concern Not Asked    Stress Concern Not Asked    Weight Concern Not Asked    Special Diet Not Asked    Back Care Not Asked    Exercise Yes     Comment: walks 30 minutes 3 to 4 times a week or 5,000 steps a day     Bike Helmet Not Asked    Seat Belt Yes    Self-Exams Not Asked   Social History Narrative    Not on file           REVIEW OF SYSTEMS:   CONSTITUTIONAL SYMPTOMS:  Negative. EYES:  Negative. EARS, NOSE, THROAT AND MOUTH:  Negative. CARDIOVASCULAR:  Negative. RESPIRATORY:  Negative. GENITOURINARY: Per HPI. GASTROINTESTINAL:  Per HPI. INTEGUMENTARY (SKIN AND/OR BREAST):  Negative.    MUSCULOSKELETAL: Per HPI.   ENDOCRINE/RHEUMATOLOGIC:  Negative. NEUROLOGICAL:  Per HPI. HEMATOLOGIC/LYMPHATIC:  Negative. ALLERGIC/IMMUNOLOGIC:  Negative. PSYCHIATRIC:  Negative. PHYSICAL EXAMINATION:   Visit Vitals  /83 (BP 1 Location: Left arm, BP Patient Position: Sitting)   Pulse 100   Temp 98.3 °F (36.8 °C) (Oral)   Resp 16   Ht 5' 6\" (1.676 m)   BMI 29.44 kg/m²    PAIN SCALE: 2/10    CONSTITUTIONAL: The patient is in no apparent distress and is alert and oriented x 3. HEENT: Normocephalic. Hearing grossly intact. NECK: Supple and symmetric. no tenderness, or masses were felt. RESPIRATORY: No labored breathing. CARDIOVASCULAR: The carotid pulses were normal. Peripheral pulses were 2+. CHEST: Normal AP diameter and normal contour without any kyphoscoliosis. LYMPHATIC: No lymphadenopathy was appreciated in the neck, axillae or groin. SKIN:  Negative for scars, rashes, lesions, or ulcers on the right upper, right lower, left upper, left lower and trunk. NEUROLOGICAL: Alert and oriented x 3. Ambulation without assistive device. FWB. EXTREMITIES:  See musculoskeletal.  MUSCULOSKELETAL:   Head and Neck: Neck pain and stiffness. Negative for misalignment, asymmetry, crepitation, defects, tenderness masses or effusions.  Left Upper Extremity: Inspection, percussion and palpation performed. Gannons sign is negative.  Right Upper Extremity: Inspection, percussion and palpation performed. Gannons sign is negative.  Spine, Ribs and Pelvis: Inspection, percussion and palpation performed. Negative for misalignment, asymmetry, crepitation, defects, tenderness masses or effusions.  Left Lower Extremity: Inspection, percussion and palpation performed. Negative straight leg raise.  Right Lower Extremity: Inspection, percussion and palpation performed. Negative straight leg raise. SPINE EXAM:     Cervical spine: Neck is midline. Normal muscle tone. No focal atrophy is noted.     Lumbar spine: No rash, ecchymosis, or gross obliquity. No focal atrophy is noted. ASSESSMENT    ICD-10-CM ICD-9-CM    1. HNP (herniated nucleus pulposus), cervical M50.20 722.0    2. Cervical spinal stenosis M48.02 723.0        Written by [employer], as dictated by Bee Richard MD.    I, Dr. Bee Rcihard MD, confirm that all documentation is accurate.

## 2019-09-10 NOTE — PROGRESS NOTES
[address] [facility]   Pre-Surgical Worksheet    Patient: Bisi Guajardo                         MRN: [id number]     Age:  48 y.o.,      Sex: female    YOB: 1965           ISAIAS: September 10, 2019  PCP: Jessica Bentley MD    No Known Allergies      ICD-10-CM ICD-9-CM    1. HNP (herniated nucleus pulposus), cervical M50.20 722.0 CBC W/O DIFF      EKG, 12 LEAD, INITIAL      METABOLIC PANEL, COMPREHENSIVE      SCHEDULE SURGERY   2. Cervical spinal stenosis M48.02 723.0        Surgery: Cervical Disc Arthroplasty C3/4, Cervical Fusion C5/6. Pain Assessment   Pain Assessment  9/10/2019   Location of Pain Neck   Pain Location Comment -   Location Modifiers -   Severity of Pain 2   Quality of Pain Aching   Duration of Pain Persistent   Frequency of Pain Constant   Aggravating Factors (No Data)   Aggravating Factors Comment its just there   Limiting Behavior -   Relieving Factors Nothing   Result of Injury -       Visit Vitals  /83 (BP 1 Location: Left arm, BP Patient Position: Sitting)   Pulse 100   Temp 98.3 °F (36.8 °C) (Oral)   Resp 16   Ht 5' 6\" (1.676 m)   BMI 29.44 kg/m²       ADL Limits: Bathing and Dressing    [facility] Injections?: No:  When . Where. Physical Therapy?: Yes  When NA. Where In [address] [place] [address] Road . NSAID's?: No:    Pain Medications?: No:   Type: [facility]: NO, Where:     Current Outpatient Medications   Medication Sig    diclofenac EC (VOLTAREN) 75 mg EC tablet Take 1 Tab by mouth two (2) times daily (with meals).  metaxalone (SKELAXIN) 800 mg tablet Take 1 Tab by mouth two (2) times daily as needed (muscle spasms).  hydroCHLOROthiazide (HYDRODIURIL) 25 mg tablet Take 1 Tab by mouth daily.  simvastatin (ZOCOR) 40 mg tablet take 1 tablet by mouth every evening    medroxyprogesterone (DEPO-PROVERA) 150 mg/mL Syrg 150 mg by IntraMUSCular route once.     MULTIVITAMINS (MULTI-VITAMIN PO) Take  by mouth. No current facility-administered medications for this visit.         Past Medical History:   Diagnosis Date    Facet arthropathy     Heart murmur 2006    High cholesterol     Iritis     Low back pain     Nausea     Spinal stenosis        Past Surgical History:   Procedure Laterality Date    HX ORTHOPAEDIC  2009    back surgery       Social History     Socioeconomic History    Marital status:      Spouse name: Not on file    Number of children: Not on file    Years of education: Not on file    Highest education level: Not on file   Tobacco Use    Smoking status: Never Smoker    Smokeless tobacco: Never Used   Substance and Sexual Activity    Alcohol use: No    Drug use: No   Other Topics Concern    Caffeine Concern Yes     Comment: 4 oz three times a week    Exercise Yes     Comment: walks 30 minutes 3 to 4 times a week or 5,000 steps a day    [address] [facility] Yes

## 2019-09-10 NOTE — PROGRESS NOTES
Jazlyn Guaman presents today for   Chief Complaint   Patient presents with    Neck Pain     SC       Is someone accompanying this pt? NO    Is the patient using any DME equipment during OV? NO    Depression Screening:  3 most recent PHQ Screens 9/10/2019   Little interest or pleasure in doing things Not at all   Feeling down, depressed, irritable, or hopeless Not at all   Total Score PHQ 2 0       Learning Assessment:  Learning Assessment 9/10/2019   PRIMARY LEARNER Patient   HIGHEST LEVEL OF EDUCATION - PRIMARY LEARNER  -   BARRIERS PRIMARY LEARNER -   CO-LEARNER CAREGIVER -   PRIMARY LANGUAGE ENGLISH   LEARNER PREFERENCE PRIMARY READING     -     -   ANSWERED BY patient   RELATIONSHIP SELF       Abuse Screening:  Abuse Screening Questionnaire 3/14/2018   Do you ever feel afraid of your partner? N   Are you in a relationship with someone who physically or mentally threatens you? N   Is it safe for you to go home? Y       Coordination of Care:  1. Have you been to the ER, urgent care clinic since your last visit? NO  Hospitalized since your last visit? NO    2. Have you seen or consulted any other health care providers outside of the [address] [place] [address] since your last visit? NO Include any pap smears or colon screening.  NO

## 2020-01-21 ENCOUNTER — OFFICE VISIT (OUTPATIENT)
Dept: FAMILY MEDICINE CLINIC | Age: 55
End: 2020-01-21

## 2020-01-21 ENCOUNTER — HOSPITAL ENCOUNTER (OUTPATIENT)
Dept: LAB | Age: 55
Discharge: HOME | End: 2020-01-21
Payer: OTHER GOVERNMENT

## 2020-01-21 VITALS
HEIGHT: 66 IN | DIASTOLIC BLOOD PRESSURE: 80 MMHG | BODY MASS INDEX: 29.67 KG/M2 | SYSTOLIC BLOOD PRESSURE: 130 MMHG | TEMPERATURE: 98.6 F | WEIGHT: 184.6 LBS | HEART RATE: 98 BPM | RESPIRATION RATE: 18 BRPM | OXYGEN SATURATION: 98 %

## 2020-01-21 DIAGNOSIS — R82.81 PYURIA: ICD-10-CM

## 2020-01-21 DIAGNOSIS — E78.00 HYPERCHOLESTEREMIA: ICD-10-CM

## 2020-01-21 DIAGNOSIS — Z13.6 SCREENING FOR CARDIOVASCULAR CONDITION: ICD-10-CM

## 2020-01-21 DIAGNOSIS — I10 ESSENTIAL HYPERTENSION: ICD-10-CM

## 2020-01-21 DIAGNOSIS — Z00.00 WELL WOMAN EXAM (NO GYNECOLOGICAL EXAM): Primary | ICD-10-CM

## 2020-01-21 LAB
ALT SERPL-CCNC: 21 U/L (ref 13–56)
ANION GAP SERPL CALC-SCNC: 5 MMOL/L (ref 3–18)
APPEARANCE UR: CLEAR
AST SERPL-CCNC: 17 U/L (ref 10–38)
BACTERIA URNS QL MICRO: ABNORMAL /HPF
BILIRUB UR QL: NEGATIVE
BUN SERPL-MCNC: 16 MG/DL (ref 7–18)
BUN/CREAT SERPL: 20 (ref 12–20)
CALCIUM SERPL-MCNC: 9.7 MG/DL (ref 8.5–10.1)
CHLORIDE SERPL-SCNC: 106 MMOL/L (ref 100–111)
CHOLEST SERPL-MCNC: 209 MG/DL
CO2 SERPL-SCNC: 29 MMOL/L (ref 21–32)
COLOR UR: YELLOW
CREAT SERPL-MCNC: 0.82 MG/DL (ref 0.6–1.3)
EPITH CASTS URNS QL MICRO: ABNORMAL /LPF (ref 0–5)
GLUCOSE SERPL-MCNC: 85 MG/DL (ref 74–99)
GLUCOSE UR STRIP.AUTO-MCNC: NEGATIVE MG/DL
HDLC SERPL-MCNC: 44 MG/DL (ref 40–60)
HDLC SERPL: 4.8 {RATIO} (ref 0–5)
HGB UR QL STRIP: ABNORMAL
KETONES UR QL STRIP.AUTO: NEGATIVE MG/DL
LDLC SERPL CALC-MCNC: 152 MG/DL (ref 0–100)
LEUKOCYTE ESTERASE UR QL STRIP.AUTO: NEGATIVE
LIPID PROFILE,FLP: ABNORMAL
NITRITE UR QL STRIP.AUTO: NEGATIVE
PH UR STRIP: 7 [PH] (ref 5–8)
POTASSIUM SERPL-SCNC: 3.2 MMOL/L (ref 3.5–5.5)
PROT UR STRIP-MCNC: NEGATIVE MG/DL
RBC #/AREA URNS HPF: ABNORMAL /HPF (ref 0–5)
SODIUM SERPL-SCNC: 140 MMOL/L (ref 136–145)
SP GR UR REFRACTOMETRY: 1.02 (ref 1–1.03)
TRIGL SERPL-MCNC: 65 MG/DL (ref ?–150)
UROBILINOGEN UR QL STRIP.AUTO: 1 EU/DL (ref 0.2–1)
VLDLC SERPL CALC-MCNC: 13 MG/DL
WBC URNS QL MICRO: ABNORMAL /HPF (ref 0–4)

## 2020-01-21 PROCEDURE — 80048 BASIC METABOLIC PNL TOTAL CA: CPT

## 2020-01-21 PROCEDURE — 84460 ALANINE AMINO (ALT) (SGPT): CPT

## 2020-01-21 PROCEDURE — 81001 URINALYSIS AUTO W/SCOPE: CPT

## 2020-01-21 PROCEDURE — 36415 COLL VENOUS BLD VENIPUNCTURE: CPT

## 2020-01-21 PROCEDURE — 84450 TRANSFERASE (AST) (SGOT): CPT

## 2020-01-21 PROCEDURE — 80061 LIPID PANEL: CPT

## 2020-01-21 NOTE — PROGRESS NOTES
Patient is in the office today for physical.      1. Have you been to the ER, urgent care clinic since your last visit? Hospitalized since your last visit? No    2. Have you seen or consulted any other health care providers outside of the [address] [place] [address] since your last visit? Include any pap smears or colon screening.  No

## 2020-01-21 NOTE — PATIENT INSTRUCTIONS
A Healthy Lifestyle: Care Instructions Your Care Instructions A healthy lifestyle can help you feel good, stay at a healthy weight, and have plenty of energy for both work and play. A healthy lifestyle is something you can share with your whole family. A healthy lifestyle also can lower your risk for serious health problems, such as high blood pressure, heart disease, and diabetes. You can follow a few steps listed below to improve your health and the health of your family. Follow-up care is a key part of your treatment and safety. Be sure to make and go to all appointments, and call your doctor if you are having problems. It's also a good idea to know your test results and keep a list of the medicines you take. How can you care for yourself at home? · Do not eat too much sugar, fat, or fast foods. You can still have dessert and treats now and then. The goal is moderation. · Start small to improve your eating habits. Pay attention to portion sizes, drink less juice and soda pop, and eat more fruits and vegetables. ? Eat a healthy amount of food. A 3-ounce serving of meat, for example, is about the size of a deck of cards. Fill the rest of your plate with vegetables and whole grains. ? Limit the amount of soda and sports drinks you have every day. Drink more water when you are thirsty. ? Eat at least 5 servings of fruits and vegetables every day. It may seem like a lot, but it is not hard to reach this goal. A serving or helping is 1 piece of fruit, 1 cup of vegetables, or 2 cups of leafy, raw vegetables. Have an apple or some carrot sticks as an afternoon snack instead of a candy bar. Try to have fruits and/or vegetables at every meal. 
· Make exercise part of your daily routine. You may want to start with simple activities, such as walking, bicycling, or slow swimming. Try to be active 30 to 60 minutes every day.  You do not need to do all 30 to 60 minutes all at once. For example, you can exercise 3 times a day for 10 or 20 minutes. Moderate exercise is safe for most people, but it is always a good idea to talk to your doctor before starting an exercise program. 
· Keep moving. [employer] Bun the lawn, work in the garden, or [employer]. Take the stairs instead of the elevator at work. · If you smoke, quit. People who smoke have an increased risk for heart attack, stroke, cancer, and other lung illnesses. Quitting is hard, but there are ways to boost your chance of quitting tobacco for good. ? Use nicotine gum, patches, or lozenges. ? Ask your doctor about stop-smoking programs and medicines. ? Keep trying. In addition to reducing your risk of diseases in the future, you will notice some benefits soon after you stop using tobacco. If you have shortness of breath or asthma symptoms, they will likely get better within a few weeks after you quit. · Limit how much alcohol you drink. Moderate amounts of alcohol (up to 2 drinks a day for men, 1 drink a day for women) are okay. But drinking too much can lead to liver problems, high blood pressure, and other health problems. Family health If you have a family, there are many things you can do together to improve your health. · Eat meals together as a family as often as possible. · Eat healthy foods. This includes fruits, vegetables, lean meats and dairy, and whole grains. · Include your family in your fitness plan. Most people think of activities such as jogging or tennis as the way to fitness, but there are many ways you and your family can be more active. Anything that makes you breathe hard and gets your heart pumping is exercise. Here are some tips: 
? Walk to do errands or to take your child to school or the bus. 
? Go for a family bike ride after dinner instead of watching TV. Where can you learn more? Go to http://jose guadalupe-juan josé.info/. Enter X393 in the search box to learn more about \"A Healthy Lifestyle: Care Instructions. \" Current as of: May 28, 2019 Content Version: 12.2 © 7746-2541 [employer]. Care instructions adapted under license by [employer] (which disclaims liability or warranty for this information). If you have questions about a medical condition or this instruction, always ask your healthcare professional. [place] [address] any warranty or liability for your use of this information.

## 2020-03-08 RX ORDER — SIMVASTATIN 40 MG/1
TABLET, FILM COATED ORAL
Qty: 30 TAB | Refills: 6 | Status: SHIPPED | OUTPATIENT
Start: 2020-03-08 | End: 2020-11-03 | Stop reason: SDUPTHER

## 2020-03-21 ENCOUNTER — HOSPITAL ENCOUNTER (OUTPATIENT)
Dept: CT IMAGING | Age: 55
Discharge: HOME | End: 2020-03-21
Attending: UROLOGY
Payer: OTHER GOVERNMENT

## 2020-03-21 DIAGNOSIS — R31.29 MICROSCOPIC HEMATURIA: ICD-10-CM

## 2020-03-21 PROCEDURE — 74011636320 HC RX REV CODE- 636/320: Performed by: UROLOGY

## 2020-03-21 PROCEDURE — 74178 CT ABD&PLV WO CNTR FLWD CNTR: CPT

## 2020-03-21 RX ADMIN — IOPAMIDOL 100 ML: 755 INJECTION, SOLUTION INTRAVENOUS at 14:06

## 2020-04-20 ENCOUNTER — E-VISIT (OUTPATIENT)
Dept: FAMILY MEDICINE CLINIC | Age: 55
End: 2020-04-20

## 2020-04-22 ENCOUNTER — VIRTUAL VISIT (OUTPATIENT)
Dept: FAMILY MEDICINE CLINIC | Age: 55
End: 2020-04-22

## 2020-04-22 DIAGNOSIS — J30.89 SEASONAL ALLERGIC RHINITIS DUE TO OTHER ALLERGIC TRIGGER: Primary | ICD-10-CM

## 2020-04-22 NOTE — PROGRESS NOTES
.  Consent: Laisha Trujillo, who was seen by synchronous (real-time) audio-video technology, and/or her healthcare decision maker, is aware that this patient-initiated, Telehealth encounter on 4/22/2020 is a billable service, with coverage as determined by her insurance carrier. She is aware that she may receive a bill and has provided verbal consent to proceed: Yes. Assessment & Plan:   Diagnoses and all orders for this visit:    1. Seasonal allergic rhinitis due to other allergic trigger      As above, new   treatment plan as listed below  No orders of the defined types were placed in this encounter. Advised claritin and flonase use, she will get these OTC    Follow-up and Dispositions    · Return if symptoms worsen or fail to improve. 712  Subjective:   Laisha Trujillo is a 50 y.o. female who was seen for Allergic Rhinitis        Pt has been having postnasal drip; Worse in am and ,   No fever,   No cough. She has tried no meds. No ear pain; Has to clear her throat a lot; Nasal passages are clear. NO TTP ;  Pt was advised once some time ago to used claritin. Denies tenderness around the face. Prior to Admission medications    Medication Sig Start Date End Date Taking? Authorizing Provider   simvastatin (ZOCOR) 40 mg tablet take 1 tablet by mouth every evening 3/8/20  Yes Aneta Hernández MD   hydroCHLOROthiazide (HYDRODIURIL) 25 mg tablet Take 1 Tab by mouth daily. 6/6/19  Yes Aneta Hernández MD   medroxyprogesterone (DEPO-PROVERA) 150 mg/mL Syrg 150 mg by IntraMUSCular route once. Yes Provider, Historical   MULTIVITAMINS (MULTI-VITAMIN PO) Take  by mouth.    Yes Provider, Historical     No Known Allergies    Patient Active Problem List    Diagnosis Date Noted    Multiple thyroid nodules 01/11/2016    Nausea     Facet arthropathy     Heart murmur     Iritis      No Known Allergies  Past Medical History:   Diagnosis Date    Facet arthropathy     Heart murmur 2006  High cholesterol     Iritis     Low back pain     Nausea     Spinal stenosis      Past Surgical History:   Procedure Laterality Date    HX ORTHOPAEDIC  2009    back surgery     Family History   Problem Relation Age of Onset    Diabetes Mother     Heart Disease Father         CAD       Review of Systems   Constitutional: Negative for chills and fever. Respiratory: Negative for shortness of breath and wheezing. Cardiovascular: Negative for chest pain and palpitations. Objective: There were no vitals taken for this visit. General: alert, cooperative, no distress   Mental  status: normal mood, behavior, speech, dress, motor activity, and thought processes, able to follow commands   HENT: NCAT   Neck: no visualized mass   Resp: no respiratory distress   Neuro: no gross deficits   Skin: no discoloration or lesions of concern on visible areas   Psychiatric: normal affect, consistent with stated mood, no evidence of hallucinations     Additional exam findings: none      We discussed the expected course, resolution and complications of the diagnosis(es) in detail. Medication risks, benefits, costs, interactions, and alternatives were discussed as indicated. I advised her to contact the office if her condition worsens, changes or fails to improve as anticipated. She expressed understanding with the diagnosis(es) and plan. Kirstie Downs is a 50 y.o. female being evaluated by a video visit encounter for concerns as above. A caregiver was present when appropriate. Due to this being a TeleHealth encounter (During SXZTP-97 public health emergency), evaluation of the following organ systems was limited: Vitals/Constitutional/EENT/Resp/CV/GI//MS/Neuro/Skin/Heme-Lymph-Imm.   Pursuant to the emergency declaration under the [address] [place] [address] and the [employer] and [employer] General Act, this Virtual  Visit was conducted, with patient's (and/or legal guardian's) consent, to reduce the patient's risk of exposure to COVID-19 and provide necessary medical care. Services were provided through a video synchronous discussion virtually to substitute for in-person clinic visit.    Patient appeared to be at home and provider was at office    Kianna Harrison MD

## 2020-04-23 NOTE — PATIENT INSTRUCTIONS
Allergies: Care Instructions Your Care Instructions Allergies occur when your body's defense system (immune system) overreacts to certain substances. The immune system treats a harmless substance as if it were a harmful germ or virus. Many things can cause this overreaction, including pollens, medicine, food, dust, animal dander, and mold. Allergies can be mild or severe. Mild allergies can be managed with home treatment. But medicine may be needed to prevent problems. Managing your allergies is an important part of staying healthy. Your doctor may suggest that you have allergy testing to help find out what is causing your allergies. When you know what things trigger your symptoms, you can avoid them. This can prevent allergy symptoms and other health problems. For severe allergies that cause reactions that affect your whole body (anaphylactic reactions), your doctor may prescribe a shot of epinephrine to carry with you in case you have a severe reaction. Learn how to give yourself the shot and keep it with you at all times. Make sure it is not . Follow-up care is a key part of your treatment and safety. Be sure to make and go to all appointments, and call your doctor if you are having problems. It's also a good idea to know your test results and keep a list of the medicines you take. How can you care for yourself at home? · If you have been told by your doctor that dust or dust mites are causing your allergy, decrease the dust around your bed: 
? Wash sheets, pillowcases, and other bedding in hot water every week. ? Use dust-proof covers for pillows, duvets, and mattresses. Avoid plastic covers because they tear easily and do not \"breathe. \" Wash as instructed on the label. ? Do not use any blankets and pillows that you do not need. ? Use blankets that you can wash in your washing machine. ? Consider removing drapes and carpets, which attract and hold dust, from your bedroom. · If you are allergic to house dust and mites, do not use home humidifiers. Your doctor can suggest ways you can control dust and mites. · Look for signs of cockroaches. Cockroaches cause allergic reactions. Use cockroach baits to get rid of them. Then, clean your home well. Cockroaches like areas where grocery bags, newspapers, empty bottles, or cardboard boxes are stored. Do not keep these inside your home, and keep trash and food containers sealed. Seal off any spots where cockroaches might enter your home. · If you are allergic to mold, get rid of furniture, rugs, and drapes that smell musty. Check for mold in the bathroom. · If you are allergic to outdoor pollen or mold spores, use air-conditioning. Change or clean all filters every month. Keep windows closed. · If you are allergic to pollen, stay inside when pollen counts are high. Use a vacuum  with a HEPA filter or a double-thickness filter at least two times each week. · Stay inside when air pollution is bad. Avoid paint fumes, perfumes, and other strong odors. · Avoid conditions that make your allergies worse. Stay away from smoke. Do not smoke or let anyone else smoke in your house. Do not use fireplaces or wood-burning stoves. · If you are allergic to your pets, change the air filter in your furnace every month. Use high-efficiency filters. · If you are allergic to pet dander, keep pets outside or out of your bedroom. Old carpet and cloth furniture can hold a lot of animal dander. You may need to replace them. When should you call for help? Give an epinephrine shot if: 
  · You think you are having a severe allergic reaction.  
  · You have symptoms in more than one body area, such as mild nausea and an itchy mouth.  
 After giving an epinephrine shot call  911, even if you feel better. 
 Call 911 if: 
  · You have symptoms of a severe allergic reaction. These may include: 
? Sudden raised, red areas (hives) all over your body. ? Swelling of the throat, mouth, lips, or tongue. ? Trouble breathing. ? Passing out (losing consciousness). Or you may feel very lightheaded or suddenly feel weak, confused, or restless.  
  · You have been given an epinephrine shot, even if you feel better.  
 Call your doctor now or seek immediate medical care if: 
  · You have symptoms of an allergic reaction, such as: ? A rash or hives (raised, red areas on the skin). ? Itching. ? Swelling. ? Belly pain, nausea, or vomiting.  
 Watch closely for changes in your health, and be sure to contact your doctor if: 
  · You do not get better as expected. Where can you learn more? Go to http://jose guadalupe-juan josé.info/ Enter A565 in the search box to learn more about \"Allergies: Care Instructions. \" Current as of: October 6, 2019Content Version: 12.4 © 2914-3042 [employer]. Care instructions adapted under license by [employer] (which disclaims liability or warranty for this information). If you have questions about a medical condition or this instruction, always ask your healthcare professional. [address] any warranty or liability for your use of this information.

## 2020-05-08 RX ORDER — HYDROCHLOROTHIAZIDE 25 MG/1
TABLET ORAL
Qty: 30 TAB | Refills: 6 | Status: SHIPPED | OUTPATIENT
Start: 2020-05-08 | End: 2020-12-17 | Stop reason: SDUPTHER

## 2020-07-21 ENCOUNTER — VIRTUAL VISIT (OUTPATIENT)
Dept: FAMILY MEDICINE CLINIC | Age: 55
End: 2020-07-21

## 2020-07-21 DIAGNOSIS — I10 ESSENTIAL HYPERTENSION: Primary | ICD-10-CM

## 2020-07-21 DIAGNOSIS — E78.00 HYPERCHOLESTEROLEMIA: ICD-10-CM

## 2020-07-21 NOTE — PATIENT INSTRUCTIONS
DASH Diet: Care Instructions  Your Care Instructions     The DASH diet is an eating plan that can help lower your blood pressure. DASH stands for Dietary Approaches to Stop Hypertension. Hypertension is high blood pressure. The DASH diet focuses on eating foods that are high in calcium, potassium, and magnesium. These nutrients can lower blood pressure. The foods that are highest in these nutrients are fruits, vegetables, low-fat dairy products, nuts, seeds, and legumes. But taking calcium, potassium, and magnesium supplements instead of eating foods that are high in those nutrients does not have the same effect. The DASH diet also includes whole grains, fish, and poultry. The DASH diet is one of several lifestyle changes your doctor may recommend to lower your high blood pressure. Your doctor may also want you to decrease the amount of sodium in your diet. Lowering sodium while following the DASH diet can lower blood pressure even further than just the DASH diet alone. Follow-up care is a key part of your treatment and safety. Be sure to make and go to all appointments, and call your doctor if you are having problems. It's also a good idea to know your test results and keep a list of the medicines you take. How can you care for yourself at home? Following the DASH diet  · Eat 4 to 5 servings of fruit each day. A serving is 1 medium-sized piece of fruit, ½ cup chopped or canned fruit, 1/4 cup dried fruit, or 4 ounces (½ cup) of fruit juice. Choose fruit more often than fruit juice. · Eat 4 to 5 servings of vegetables each day. A serving is 1 cup of lettuce or raw leafy vegetables, ½ cup of chopped or cooked vegetables, or 4 ounces (½ cup) of vegetable juice. Choose vegetables more often than vegetable juice. · Get 2 to 3 servings of low-fat and fat-free dairy each day. A serving is 8 ounces of milk, 1 cup of yogurt, or 1 ½ ounces of cheese. · Eat 6 to 8 servings of grains each day.  A serving is 1 slice of bread, 1 ounce of dry cereal, or ½ cup of cooked rice, pasta, or cooked cereal. Try to choose whole-grain products as much as possible. · Limit lean meat, poultry, and fish to 2 servings each day. A serving is 3 ounces, about the size of a deck of cards. · Eat 4 to 5 servings of nuts, seeds, and legumes (cooked dried beans, lentils, and split peas) each week. A serving is 1/3 cup of nuts, 2 tablespoons of seeds, or ½ cup of cooked beans or peas. · Limit fats and oils to 2 to 3 servings each day. A serving is 1 teaspoon of vegetable oil or 2 tablespoons of salad dressing. · Limit sweets and added sugars to 5 servings or less a week. A serving is 1 tablespoon jelly or jam, ½ cup sorbet, or 1 cup of lemonade. · Eat less than 2,300 milligrams (mg) of sodium a day. If you limit your sodium to 1,500 mg a day, you can lower your blood pressure even more. Tips for success  · Start small. Do not try to make dramatic changes to your diet all at once. You might feel that you are missing out on your favorite foods and then be more likely to not follow the plan. Make small changes, and stick with them. Once those changes become habit, add a few more changes. · Try some of the following:  ? Make it a goal to eat a fruit or vegetable at every meal and at snacks. This will make it easy to get the recommended amount of fruits and vegetables each day. ? Try yogurt topped with fruit and nuts for a snack or healthy dessert. ? Add lettuce, tomato, cucumber, and onion to sandwiches. ? Combine a ready-made pizza crust with low-fat mozzarella cheese and lots of vegetable toppings. Try using tomatoes, squash, spinach, broccoli, carrots, cauliflower, and onions. ? Have a variety of cut-up vegetables with a low-fat dip as an appetizer instead of chips and dip. ? Sprinkle sunflower seeds or chopped almonds over salads. Or try adding chopped walnuts or almonds to cooked vegetables.   ? Try some vegetarian meals using beans and peas. Add garbanzo or kidney beans to salads. Make burritos and tacos with mashed trin[place] beans or black beans. Where can you learn more? Go to http://jose guadalupe-juan josé.info/  Enter H967 in the search box to learn more about \"DASH Diet: Care Instructions. \"  Current as of: December 16, 2019               Content Version: 12.5  © 7018-8479 [email or web address]. Care instructions adapted under license by [employer] (which disclaims liability or warranty for this information). If you have questions about a medical condition or this instruction, always ask your healthcare professional. [address] any warranty or liability for your use of this information.

## 2020-07-21 NOTE — PROGRESS NOTES
Arthur Crump is a 50 y.o. female who was seen by synchronous (real-time) audio-video technology on 7/21/2020 for Hypertension and Blood in Urine        Assessment & Plan:   Diagnoses and all orders for this visit:    1. Essential hypertension  -     METABOLIC PANEL, BASIC; Future    2. Hypercholesterolemia  -     AST; Future  -     ALT; Future  -     LIPID PANEL; Future        As above,   above all stable unless otherwise noted   treatment plan as listed below  Orders Placed This Encounter    AST    ALT    LIPID PANEL    METABOLIC PANEL, BASIC         As above,   above all stable unless otherwise noted   treatment plan as listed below  Orders Placed This Encounter    AST    ALT    LIPID PANEL    METABOLIC PANEL, BASIC     Follow-up and Dispositions    · Return in about 4 months (around 11/21/2020) for htn, Chol. This has been fully explained to the patient, who indicates understanding. 712  Subjective:     Here to follow up on HTN:    BP was stable at a recent dental appointment. .  Tolerates HCTZ. On zocor; Takes med daily. Attempts a lower cholesterol diet. Stays active. Has had a negative work up at urology for microscopic hematuria. She has been well and has no new complaints. Lab Results   Component Value Date/Time    Cholesterol, total 209 (H) 01/21/2020 11:20 AM    HDL Cholesterol 44 01/21/2020 11:20 AM    LDL, calculated 152 (H) 01/21/2020 11:20 AM    VLDL, calculated 13 01/21/2020 11:20 AM    Triglyceride 65 01/21/2020 11:20 AM    CHOL/HDL Ratio 4.8 01/21/2020 11:20 AM       Prior to Admission medications    Medication Sig Start Date End Date Taking?  Authorizing Provider   hydroCHLOROthiazide (HYDRODIURIL) 25 mg tablet take 1 tablet by mouth once daily 5/8/20  Yes Clay Aldridge MD   simvastatin (ZOCOR) 40 mg tablet take 1 tablet by mouth every evening 3/8/20  Yes Clay Aldridge MD   medroxyprogesterone (DEPO-PROVERA) 150 mg/mL Syrg 150 mg by IntraMUSCular route once.   Yes Provider, Historical   MULTIVITAMINS (MULTI-VITAMIN PO) Take  by mouth. Yes Provider, Historical     Patient Active Problem List    Diagnosis Date Noted    Multiple thyroid nodules 01/11/2016    Nausea     Facet arthropathy     Heart murmur     Iritis      No Known Allergies  Past Medical History:   Diagnosis Date    Facet arthropathy     Heart murmur 2006    High cholesterol     Iritis     Low back pain     Nausea     Spinal stenosis      Past Surgical History:   Procedure Laterality Date    HX ORTHOPAEDIC  2009    back surgery     Family History   Problem Relation Age of Onset    Diabetes Mother     Heart Disease Father         CAD     Social History     Tobacco Use    Smoking status: Never Smoker    Smokeless tobacco: Never Used   Substance Use Topics    Alcohol use: No       Review of Systems   Constitutional: Negative for chills and fever. Respiratory: Negative for shortness of breath and wheezing. Cardiovascular: Negative for chest pain and palpitations. All other systems reviewed and are negative. Objective:   No flowsheet data found. General: alert, cooperative, no distress   Mental  status: normal mood, behavior, speech, dress, motor activity, and thought processes, able to follow commands   HENT: NCAT   Neck: no visualized mass   Resp: no respiratory distress   Neuro: no gross deficits   Skin: no discoloration or lesions of concern on visible areas   Psychiatric: normal affect, consistent with stated mood, no evidence of hallucinations     Additional exam findings: none      We discussed the expected course, resolution and complications of the diagnosis(es) in detail. Medication risks, benefits, costs, interactions, and alternatives were discussed as indicated. I advised her to contact the office if her condition worsens, changes or fails to improve as anticipated. She expressed understanding with the diagnosis(es) and plan.        Monica Yo, who was evaluated through a patient-initiated, synchronous (real-time) audio-video encounter, and/or her healthcare decision maker, is aware that it is a billable service, with coverage as determined by her insurance carrier. She provided verbal consent to proceed: Yes, and patient identification was verified. It was conducted pursuant to the emergency declaration under the [address] [place] [address] and the [employer] and [employer] General Act. A caregiver was present when appropriate. Ability to conduct physical exam was limited. I was in the office. The patient was appeared to be at work. Greg Arguello MD

## 2020-07-27 ENCOUNTER — HOSPITAL ENCOUNTER (OUTPATIENT)
Dept: LAB | Age: 55
Discharge: HOME | End: 2020-07-27
Payer: OTHER GOVERNMENT

## 2020-07-27 DIAGNOSIS — I10 ESSENTIAL HYPERTENSION: ICD-10-CM

## 2020-07-27 DIAGNOSIS — E78.00 HYPERCHOLESTEROLEMIA: ICD-10-CM

## 2020-07-27 LAB
ALT SERPL-CCNC: 23 U/L (ref 13–56)
ANION GAP SERPL CALC-SCNC: 6 MMOL/L (ref 3–18)
AST SERPL-CCNC: 15 U/L (ref 10–38)
BUN SERPL-MCNC: 11 MG/DL (ref 7–18)
BUN/CREAT SERPL: 13 (ref 12–20)
CALCIUM SERPL-MCNC: 9.4 MG/DL (ref 8.5–10.1)
CHLORIDE SERPL-SCNC: 106 MMOL/L (ref 100–111)
CHOLEST SERPL-MCNC: 187 MG/DL
CO2 SERPL-SCNC: 30 MMOL/L (ref 21–32)
CREAT SERPL-MCNC: 0.87 MG/DL (ref 0.6–1.3)
GLUCOSE SERPL-MCNC: 105 MG/DL (ref 74–99)
HDLC SERPL-MCNC: 42 MG/DL (ref 40–60)
HDLC SERPL: 4.5 {RATIO} (ref 0–5)
LDLC SERPL CALC-MCNC: 134.2 MG/DL (ref 0–100)
LIPID PROFILE,FLP: ABNORMAL
POTASSIUM SERPL-SCNC: 3.2 MMOL/L (ref 3.5–5.5)
SODIUM SERPL-SCNC: 142 MMOL/L (ref 136–145)
TRIGL SERPL-MCNC: 54 MG/DL (ref ?–150)
VLDLC SERPL CALC-MCNC: 10.8 MG/DL

## 2020-07-27 PROCEDURE — 80048 BASIC METABOLIC PNL TOTAL CA: CPT

## 2020-07-27 PROCEDURE — 36415 COLL VENOUS BLD VENIPUNCTURE: CPT

## 2020-07-27 PROCEDURE — 84460 ALANINE AMINO (ALT) (SGPT): CPT

## 2020-07-27 PROCEDURE — 84450 TRANSFERASE (AST) (SGOT): CPT

## 2020-07-27 PROCEDURE — 80061 LIPID PANEL: CPT

## 2020-09-25 ENCOUNTER — OFFICE VISIT (OUTPATIENT)
Dept: ORTHOPEDIC SURGERY | Age: 55
End: 2020-09-25
Payer: OTHER GOVERNMENT

## 2020-09-25 VITALS
HEIGHT: 66 IN | RESPIRATION RATE: 16 BRPM | BODY MASS INDEX: 29.25 KG/M2 | TEMPERATURE: 97 F | WEIGHT: 182 LBS | HEART RATE: 100 BPM | SYSTOLIC BLOOD PRESSURE: 142 MMHG | DIASTOLIC BLOOD PRESSURE: 79 MMHG | OXYGEN SATURATION: 100 %

## 2020-09-25 DIAGNOSIS — M48.02 CERVICAL SPINAL STENOSIS: ICD-10-CM

## 2020-09-25 DIAGNOSIS — M50.20 HNP (HERNIATED NUCLEUS PULPOSUS), CERVICAL: Primary | ICD-10-CM

## 2020-09-25 PROCEDURE — 99213 OFFICE O/P EST LOW 20 MIN: CPT | Performed by: ORTHOPAEDIC SURGERY

## 2020-09-25 NOTE — PROGRESS NOTES
Josselin Bhatia presents today for   Chief Complaint   Patient presents with    Neck Pain       Is someone accompanying this pt? no    Is the patient using any DME equipment during OV? no    Depression Screening:  3 most recent PHQ Screens 9/10/2019   Little interest or pleasure in doing things Not at all   Feeling down, depressed, irritable, or hopeless Not at all   Total Score PHQ 2 0       Learning Assessment:  Learning Assessment 9/10/2019   PRIMARY LEARNER Patient   HIGHEST LEVEL OF EDUCATION - PRIMARY LEARNER  -   BARRIERS PRIMARY LEARNER -   CO-LEARNER CAREGIVER -   PRIMARY LANGUAGE ENGLISH   LEARNER PREFERENCE PRIMARY READING     -     -   ANSWERED BY patient   RELATIONSHIP SELF       Abuse Screening:  Abuse Screening Questionnaire 3/14/2018   Do you ever feel afraid of your partner? N   Are you in a relationship with someone who physically or mentally threatens you? N   Is it safe for you to go home? Y         Coordination of Care:  1. Have you been to the ER, urgent care clinic since your last visit? no  Hospitalized since your last visit? no    2. Have you seen or consulted any other health care providers outside of the [address] [place] [address] since your last visit? no Include any pap smears or colon screening.  no

## 2020-09-25 NOTE — LETTER
9/25/20 Patient: Lilly Dejesus YOB: 1965 Date of Visit: 9/25/2020 Yareli Cao MD 
[address] [facility] [address] VIA In Basket Dear Yareli Cao MD, Thank you for referring Ms. Sissy Mcghee to [facility] for evaluation. My notes for this consultation are attached. If you have questions, please do not hesitate to call me. I look forward to following your patient along with you.  
 
 
Sincerely, 
 
Jc Forbes MD

## 2020-09-25 NOTE — PROGRESS NOTES
[facility]   Pre-Surgical Worksheet    Patient: Ovidio Marsh                         MRN: [id number]     Age:  50 y.o.,      Sex: female    YOB: 1965           ISAIAS: September 25, 2020  PCP: Jones Andersen MD    No Known Allergies      ICD-10-CM ICD-9-CM    1. HNP (herniated nucleus pulposus), cervical  M50.20 722.0    2. Cervical spinal stenosis  M48.02 723.0        Surgery: C3/4 cervical disc arthroplasty. Pain Assessment   Pain Assessment  9/25/2020   Location of Pain Neck; Shoulder;Arm   Pain Location Comment -   Location Modifiers Left   Severity of Pain 5   Quality of Pain Aching;Burning   Duration of Pain Persistent   Frequency of Pain Constant   Aggravating Factors Other (Comment)   Aggravating Factors Comment hurts no matter what i do   Limiting Behavior Yes   Relieving Factors Nothing   Result of Injury -       Visit Vitals  BP (!) 142/79 (BP 1 Location: Right arm, BP Patient Position: Sitting) Comment: pt asymptomatic, MD aware   Pulse 100   Temp 97 °F (36.1 °C) (Skin)   Resp 16   Ht 5' 6\" (1.676 m)   Wt 182 lb (82.6 kg)   SpO2 100% Comment: RA   BMI 29.38 kg/m²       ADL Limits: pt states she can do things but has to go slow and take breaks. Spine Surgery?: Yes When 2011. Where [facility]. Spinal Injections?: Yes  When 2009. Where [facility]. Physical Therapy?: No:   When . Where. NSAID's?: No:     Pain Medications?: No:   Type: Kristie Plaster In Pain Management: NO, Where:     Current Outpatient Medications   Medication Sig    hydroCHLOROthiazide (HYDRODIURIL) 25 mg tablet take 1 tablet by mouth once daily    simvastatin (ZOCOR) 40 mg tablet take 1 tablet by mouth every evening    medroxyprogesterone (DEPO-PROVERA) 150 mg/mL Syrg 150 mg by IntraMUSCular route every three (3) months.  MULTIVITAMINS (MULTI-VITAMIN PO) Take 1 Tab by mouth daily. No current facility-administered medications for this visit.         Past Medical History:   Diagnosis Date  Facet arthropathy     Heart murmur 2006    High cholesterol     Iritis     Low back pain     Nausea     Spinal stenosis        Past Surgical History:   Procedure Laterality Date    HX ORTHOPAEDIC  2009    back surgery       Social History     Socioeconomic History    Marital status:      Spouse name: Not on file    Number of children: Not on file    Years of education: Not on file    Highest education level: Not on file   Tobacco Use    Smoking status: Never Smoker    Smokeless tobacco: Never Used   Substance and Sexual Activity    Alcohol use: No    Drug use: No   Other Topics Concern    Caffeine Concern Yes     Comment: 4 oz three times a week    Exercise Yes     Comment: walks 30 minutes 3 to 4 times a week or 5,000 steps a day    [address] [facility] Yes

## 2020-09-25 NOTE — PROGRESS NOTES
[address]  [place]  Phone: [phone]  Fax: [phone]  PROGRESS NOTE  Patient: Chantelle Clark                MRN: [id number]       SSN: [id number]  YOB: 1965        AGE: 50 y.o. SEX: female  Body mass index is 29.38 kg/m². PCP: Elvi Granda MD  09/25/20    Chief Complaint   Patient presents with    Neck Pain       HISTORY OF PRESENT ILLNESS, RADIOGRAPHS, and PLAN:     This returns today. She is continuing to have severe neck pain. We reviewed the nature of her problems. She has disc herniation at 3 4 disc protrusion with facet arthropathy at 5 6 this signal change to the left for her pain is been progressive. At this time the determination was to be surgical intervention I believe either a two-level disc arthroplasty at 3 4 and 561 arthroplasty at 3 4 and a fusion 6 would be most appropriate. I would not want to fuse both levels aside to have the doing a fusion of the intervening segment. I discussed with her the risks benefits complications and alternatives to surgery. She wishes to proceed, uncertain how the insurer will handle levels this of arthroplasty or combination of the left knee at 1 level with an anxiety and then benign and fusion at a level below. I think a hybrid approach is medically appropriate and engaging. Patient wishes to proceed with surgery as permitted. This dictation was created utilizing voice recognition software. Errors may be present.        Past Medical History:   Diagnosis Date    Facet arthropathy     Heart murmur 2006    High cholesterol     Iritis     Low back pain     Nausea     Spinal stenosis        Family History   Problem Relation Age of Onset    Diabetes Mother     Heart Disease Father         CAD       Current Outpatient Medications   Medication Sig Dispense Refill    hydroCHLOROthiazide (HYDRODIURIL) 25 mg tablet take 1 tablet by mouth once daily 30 Tab 6    simvastatin (ZOCOR) 40 mg tablet take 1 tablet by mouth every evening 30 Tab 6    medroxyprogesterone (DEPO-PROVERA) 150 mg/mL Syrg 150 mg by IntraMUSCular route every three (3) months.  MULTIVITAMINS (MULTI-VITAMIN PO) Take 1 Tab by mouth daily.          No Known Allergies    Past Surgical History:   Procedure Laterality Date    HX ORTHOPAEDIC  2009    back surgery       Past Medical History:   Diagnosis Date    Facet arthropathy     Heart murmur 2006    High cholesterol     Iritis     Low back pain     Nausea     Spinal stenosis        Social History     Socioeconomic History    Marital status:      Spouse name: Not on file    Number of children: Not on file    Years of education: Not on file    Highest education level: Not on file   Occupational History    Not on file   Social Needs    Financial resource strain: Not on file    Food insecurity     Worry: Not on file     Inability: Not on file    Transportation needs     Medical: Not on file     Non-medical: Not on file   Tobacco Use    Smoking status: Never Smoker    Smokeless tobacco: Never Used   Substance and Sexual Activity    Alcohol use: No    Drug use: No    Sexual activity: Not on file     Comment:    Lifestyle    Physical activity     Days per week: Not on file     Minutes per session: Not on file    Stress: Not on file   Relationships    Social connections     Talks on phone: Not on file     Gets together: Not on file     Attends Zoroastrian service: Not on file     Active member of club or organization: Not on file     Attends meetings of clubs or organizations: Not on file     Relationship status: Not on file    Intimate partner violence     Fear of current or ex partner: Not on file     Emotionally abused: Not on file     Physically abused: Not on file     Forced sexual activity: Not on file   Other Topics Concern   [address] Service Not Asked    Blood Transfusions Not Asked   Jordon Eubanks Caffeine Concern Yes     Comment: 4 oz three times a week    Occupational Exposure Not Asked    Hobby Hazards Not Asked    Sleep Concern Not Asked    Stress Concern Not Asked    Weight Concern Not Asked    Special Diet Not Asked    Back Care Not Asked    Exercise Yes     Comment: walks 30 minutes 3 to 4 times a week or 5,000 steps a day     Bike Helmet Not Asked    Seat Belt Yes    Self-Exams Not Asked   Social History Narrative    Not on file         REVIEW OF SYSTEMS:   CONSTITUTIONAL SYMPTOMS:  Negative. EYES:  Negative. EARS, NOSE, THROAT AND MOUTH:  Negative. CARDIOVASCULAR:  Negative. RESPIRATORY:  Negative. GENITOURINARY: Per HPI. GASTROINTESTINAL:  Per HPI. INTEGUMENTARY (SKIN AND/OR BREAST):  Negative. MUSCULOSKELETAL: Per HPI.   ENDOCRINE/RHEUMATOLOGIC:  Negative. NEUROLOGICAL:  Per HPI. HEMATOLOGIC/LYMPHATIC:  Negative. ALLERGIC/IMMUNOLOGIC:  Negative. PSYCHIATRIC:  Negative. PHYSICAL EXAMINATION:   Visit Vitals  BP (!) 142/79 (BP 1 Location: Right arm, BP Patient Position: Sitting) Comment: pt asymptomatic, MD aware   Pulse 100   Temp 97 °F (36.1 °C) (Skin)   Resp 16   Ht 5' 6\" (1.676 m)   Wt 182 lb (82.6 kg)   SpO2 100% Comment: RA   BMI 29.38 kg/m²    PAIN SCALE: 5/10    CONSTITUTIONAL: The patient is in no apparent distress and is alert and oriented x 3. HEENT: Normocephalic. Hearing grossly intact. NECK: Supple and symmetric. no tenderness, or masses were felt. RESPIRATORY: No labored breathing. CARDIOVASCULAR: The carotid pulses were normal. Peripheral pulses were 2+. CHEST: Normal AP diameter and normal contour without any kyphoscoliosis. LYMPHATIC: No lymphadenopathy was appreciated in the neck, axillae or groin. SKIN: Negative for scars, rashes, lesions, or ulcers on the right upper, right lower, left upper, left lower and trunk. NEUROLOGICAL: Alert and oriented x 3. Ambulation without assistive device. FWB.   EXTREMITIES: See musculoskeletal.  MUSCULOSKELETAL:   Head and Neck: Neck pain and stiffness. Negative for misalignment, asymmetry, crepitation, defects, tenderness masses or effusions.  Left Upper Extremity: Inspection, percussion and palpation performed. Gannons sign is negative.  Right Upper Extremity: Inspection, percussion and palpation performed. Gannons sign is negative.  Spine, Ribs and Pelvis: Inspection, percussion and palpation performed. Negative for misalignment, asymmetry, crepitation, defects, tenderness masses or effusions.  Left Lower Extremity: Inspection, percussion and palpation performed. Negative straight leg raise.  Right Lower Extremity: Inspection, percussion and palpation performed. Negative straight leg raise. SPINE EXAM:     Cervical spine: Neck is midline. Normal muscle tone. No focal atrophy is noted. Lumbar spine: No rash, ecchymosis, or gross obliquity. No focal atrophy is noted. ASSESSMENT    ICD-10-CM ICD-9-CM    1. HNP (herniated nucleus pulposus), cervical  M50.20 722.0    2. Cervical spinal stenosis  M48.02 723.0        Written by Cassidy Leslie, as dictated by Reyna Pruitt MD.    I, Dr. Reyna Pruitt MD, confirm that all documentation is accurate.

## 2020-11-03 ENCOUNTER — VIRTUAL VISIT (OUTPATIENT)
Dept: FAMILY MEDICINE CLINIC | Age: 55
End: 2020-11-03
Payer: OTHER GOVERNMENT

## 2020-11-03 DIAGNOSIS — I10 ESSENTIAL HYPERTENSION: Primary | ICD-10-CM

## 2020-11-03 DIAGNOSIS — R73.9 ELEVATED BLOOD SUGAR: ICD-10-CM

## 2020-11-03 DIAGNOSIS — E78.00 HYPERCHOLESTEROLEMIA: ICD-10-CM

## 2020-11-03 PROCEDURE — 99214 OFFICE O/P EST MOD 30 MIN: CPT | Performed by: FAMILY MEDICINE

## 2020-11-03 RX ORDER — SIMVASTATIN 40 MG/1
40 TABLET, FILM COATED ORAL
Qty: 90 TAB | Refills: 3 | Status: SHIPPED | OUTPATIENT
Start: 2020-11-03 | End: 2022-01-14

## 2020-11-03 NOTE — PATIENT INSTRUCTIONS
Learning About Low-Carbohydrate Diets What is a low-carbohydrate diet? A low-carbohydrate (or \"low-carb\") diet limits foods and drinks that have carbohydrates. This includes grains, fruits, milk and yogurt, and starchy vegetables like potatoes, beans, and corn. It also avoids foods and drinks that have added sugar. Instead, low-carb diets include foods that are high in protein and fat. Why might you follow a low-carb diet? Low-carb diets may be used for a variety of reasons, such as for weight loss. People who have diabetes may use a low-carb diet to help manage their blood sugar levels. What should you do before you start the diet? Talk to your doctor before you try any diet. This is even more important if you have health problems like kidney disease, heart disease, or diabetes. Your doctor may suggest that you meet with a registered dietitian. A dietitian can help you make an eating plan that works for you. What foods do you eat on a low-carb diet? On a low-carb diet, you choose foods that are high in protein and fat. Examples of these are: · Meat, poultry, and fish. · Eggs. · Nuts, such as walnuts, pecans, almonds, and peanuts. · Peanut butter and other nut butters. · Tofu. · Avocado. · Gar Hero. · Non-starchy vegetables like broccoli, cauliflower, green beans, mushrooms, peppers, lettuce, and spinach. · Unsweetened non-dairy milks like almond milk and coconut milk. · Cheese, cottage cheese, and cream cheese. Current as of: August 22, 2019               Content Version: 12.6 © 0766-8380 [employer]. Care instructions adapted under license by [employer] (which disclaims liability or warranty for this information). If you have questions about a medical condition or this instruction, always ask your healthcare professional. [address] any warranty or liability for your use of this information.

## 2020-11-03 NOTE — PROGRESS NOTES
Shahla Resendiz is a 50 y.o. female who was seen by synchronous (real-time) audio-video technology on 11/3/2020 for Hypertension        Assessment & Plan:   Diagnoses and all orders for this visit:    1. Essential hypertension  -     METABOLIC PANEL, COMPREHENSIVE; Future    2. Elevated blood sugar  -     HEMOGLOBIN A1C WITH EAG; Future    3. Hypercholesterolemia  -     LIPID PANEL; Future    Other orders  -     simvastatin (ZOCOR) 40 mg tablet; Take 1 Tab by mouth nightly. As above,  treatment plan as listed below  Orders Placed This Encounter    LIPID PANEL    METABOLIC PANEL, COMPREHENSIVE    HEMOGLOBIN A1C WITH EAG    simvastatin (ZOCOR) 40 mg tablet     Refilled zocor; Lower sugar/carb diet advised  Follow-up and Dispositions    · Return in about 4 months (around 3/3/2021) for well exam.       This has been fully explained to the patient, who indicates understanding. AVS is accessible thru [facility] and pt has been advised of same. 712  Subjective:     Pt here to follow up on HTN and Chol    HTN:  On meds as listed below; Does not take BPs at home; compliant with her BP meds    Chol: attempts a lower chol diet; On zocor, needs a refill    Noted to have an elevated blood sugar at last check; will recheck and also check an A1c    Lower sugar diet is advised. Info on AVS.    Has been to spine in routine follow up for her neck. Still has some pain at times; Does not want surgery as of yet. Prior to Admission medications    Medication Sig Start Date End Date Taking? Authorizing Provider   simvastatin (ZOCOR) 40 mg tablet Take 1 Tab by mouth nightly. 11/3/20  Yes Ardyth Staff, MD   hydroCHLOROthiazide (HYDRODIURIL) 25 mg tablet take 1 tablet by mouth once daily 5/8/20  Yes Arlorith Staff, MD   medroxyprogesterone (DEPO-PROVERA) 150 mg/mL Syrg 150 mg by IntraMUSCular route every three (3) months. Yes Provider, Lauren   MULTIVITAMINS (MULTI-VITAMIN PO) Take 1 Tab by mouth daily. Yes Provider, Historical     Patient Active Problem List    Diagnosis Date Noted    Multiple thyroid nodules 01/11/2016    Nausea     Facet arthropathy     Heart murmur     Iritis      No Known Allergies  Past Medical History:   Diagnosis Date    Facet arthropathy     Heart murmur 2006    High cholesterol     Iritis     Low back pain     Nausea     Spinal stenosis      Past Surgical History:   Procedure Laterality Date    HX ORTHOPAEDIC  2009    back surgery     Family History   Problem Relation Age of Onset    Diabetes Mother     Heart Disease Father         CAD     Social History     Tobacco Use    Smoking status: Never Smoker    Smokeless tobacco: Never Used   Substance Use Topics    Alcohol use: No       Review of Systems   Constitutional: Negative for chills and fever. Respiratory: Negative for shortness of breath and wheezing. Cardiovascular: Negative for chest pain and palpitations. All other systems reviewed and are negative. Objective:   No flowsheet data found. General: alert, cooperative, no distress   Mental  status: normal mood, behavior, speech, dress, motor activity, and thought processes, able to follow commands   HENT: NCAT   Neck: no visualized mass   Resp: no respiratory distress   Neuro: no gross deficits   Skin: no discoloration or lesions of concern on visible areas   Psychiatric: normal affect, consistent with stated mood, no evidence of hallucinations     Additional exam findings: none    We discussed the expected course, resolution and complications of the diagnosis(es) in detail. Medication risks, benefits, costs, interactions, and alternatives were discussed as indicated. I advised her to contact the office if her condition worsens, changes or fails to improve as anticipated. She expressed understanding with the diagnosis(es) and plan.        Jay Jay Lowe, who was evaluated through a patient-initiated, synchronous (real-time) audio-video encounter, and/or her healthcare decision maker, is aware that it is a billable service, with coverage as determined by her insurance carrier. She provided verbal consent to proceed: Yes, and patient identification was verified. It was conducted pursuant to the emergency declaration under the [address] [place] [address], [address] [place] [address] authority and the Care at Hand and [employer] General Act. A caregiver was present when appropriate. Ability to conduct physical exam was limited. I was in the office. The patient was at home.       Franklin Valles MD

## 2020-11-16 ENCOUNTER — HOSPITAL ENCOUNTER (OUTPATIENT)
Dept: LAB | Age: 55
Discharge: HOME | End: 2020-11-16
Payer: OTHER GOVERNMENT

## 2020-11-16 ENCOUNTER — APPOINTMENT (OUTPATIENT)
Dept: FAMILY MEDICINE CLINIC | Age: 55
End: 2020-11-16

## 2020-11-16 DIAGNOSIS — E78.00 HYPERCHOLESTEROLEMIA: ICD-10-CM

## 2020-11-16 DIAGNOSIS — R73.9 ELEVATED BLOOD SUGAR: ICD-10-CM

## 2020-11-16 DIAGNOSIS — I10 ESSENTIAL HYPERTENSION: ICD-10-CM

## 2020-11-16 LAB
ALBUMIN SERPL-MCNC: 3.7 G/DL (ref 3.4–5)
ALBUMIN/GLOB SERPL: 1 {RATIO} (ref 0.8–1.7)
ALP SERPL-CCNC: 76 U/L (ref 45–117)
ALT SERPL-CCNC: 20 U/L (ref 13–56)
ANION GAP SERPL CALC-SCNC: 5 MMOL/L (ref 3–18)
AST SERPL-CCNC: 15 U/L (ref 10–38)
BILIRUB SERPL-MCNC: 0.6 MG/DL (ref 0.2–1)
BUN SERPL-MCNC: 10 MG/DL (ref 7–18)
BUN/CREAT SERPL: 11 (ref 12–20)
CALCIUM SERPL-MCNC: 9.3 MG/DL (ref 8.5–10.1)
CHLORIDE SERPL-SCNC: 106 MMOL/L (ref 100–111)
CHOLEST SERPL-MCNC: 195 MG/DL
CO2 SERPL-SCNC: 30 MMOL/L (ref 21–32)
CREAT SERPL-MCNC: 0.92 MG/DL (ref 0.6–1.3)
EST. AVERAGE GLUCOSE BLD GHB EST-MCNC: 111 MG/DL
GLOBULIN SER CALC-MCNC: 3.8 G/DL (ref 2–4)
GLUCOSE SERPL-MCNC: 97 MG/DL (ref 74–99)
HBA1C MFR BLD: 5.5 % (ref 4.2–5.6)
HDLC SERPL-MCNC: 45 MG/DL (ref 40–60)
HDLC SERPL: 4.3 {RATIO} (ref 0–5)
LDLC SERPL CALC-MCNC: 141 MG/DL (ref 0–100)
LIPID PROFILE,FLP: ABNORMAL
POTASSIUM SERPL-SCNC: 3.3 MMOL/L (ref 3.5–5.5)
PROT SERPL-MCNC: 7.5 G/DL (ref 6.4–8.2)
SODIUM SERPL-SCNC: 141 MMOL/L (ref 136–145)
TRIGL SERPL-MCNC: 45 MG/DL (ref ?–150)
VLDLC SERPL CALC-MCNC: 9 MG/DL

## 2020-11-16 PROCEDURE — 80061 LIPID PANEL: CPT

## 2020-11-16 PROCEDURE — 83036 HEMOGLOBIN GLYCOSYLATED A1C: CPT

## 2020-11-16 PROCEDURE — 80053 COMPREHEN METABOLIC PANEL: CPT

## 2020-11-16 PROCEDURE — 36415 COLL VENOUS BLD VENIPUNCTURE: CPT

## 2020-12-17 NOTE — TELEPHONE ENCOUNTER
Last Visit: 11/3/20 with MD Bill Edward  Next Appointment: 3/8/21 with MD Bill Edward  Previous Refill Encounter(s): 5/8/20 #30 with 6 refills    Requested Prescriptions     Pending Prescriptions Disp Refills    hydroCHLOROthiazide (HYDRODIURIL) 25 mg tablet 90 Tab 1     Sig: Take 1 Tab by mouth daily.

## 2020-12-22 RX ORDER — HYDROCHLOROTHIAZIDE 25 MG/1
TABLET ORAL
Qty: 30 TAB | Refills: 6 | Status: CANCELLED | OUTPATIENT
Start: 2020-12-22

## 2020-12-24 RX ORDER — HYDROCHLOROTHIAZIDE 25 MG/1
25 TABLET ORAL DAILY
Qty: 90 TAB | Refills: 3 | Status: SHIPPED | OUTPATIENT
Start: 2020-12-24 | End: 2022-01-14

## 2021-03-08 ENCOUNTER — OFFICE VISIT (OUTPATIENT)
Dept: FAMILY MEDICINE CLINIC | Age: 56
End: 2021-03-08
Payer: OTHER GOVERNMENT

## 2021-03-08 VITALS
DIASTOLIC BLOOD PRESSURE: 72 MMHG | RESPIRATION RATE: 20 BRPM | HEART RATE: 97 BPM | SYSTOLIC BLOOD PRESSURE: 134 MMHG | WEIGHT: 179 LBS | TEMPERATURE: 97.6 F | HEIGHT: 66 IN | BODY MASS INDEX: 28.77 KG/M2

## 2021-03-08 DIAGNOSIS — R73.9 ELEVATED BLOOD SUGAR: Primary | ICD-10-CM

## 2021-03-08 DIAGNOSIS — I10 ESSENTIAL HYPERTENSION: ICD-10-CM

## 2021-03-08 DIAGNOSIS — E78.00 HYPERCHOLESTEROLEMIA: ICD-10-CM

## 2021-03-08 PROCEDURE — 99214 OFFICE O/P EST MOD 30 MIN: CPT | Performed by: FAMILY MEDICINE

## 2021-03-08 NOTE — PATIENT INSTRUCTIONS
DASH Diet: Care Instructions Your Care Instructions The DASH diet is an eating plan that can help lower your blood pressure. DASH stands for Dietary Approaches to Stop Hypertension. Hypertension is high blood pressure. The DASH diet focuses on eating foods that are high in calcium, potassium, and magnesium. These nutrients can lower blood pressure. The foods that are highest in these nutrients are fruits, vegetables, low-fat dairy products, nuts, seeds, and legumes. But taking calcium, potassium, and magnesium supplements instead of eating foods that are high in those nutrients does not have the same effect. The DASH diet also includes whole grains, fish, and poultry. The DASH diet is one of several lifestyle changes your doctor may recommend to lower your high blood pressure. Your doctor may also want you to decrease the amount of sodium in your diet. Lowering sodium while following the DASH diet can lower blood pressure even further than just the DASH diet alone. Follow-up care is a key part of your treatment and safety. Be sure to make and go to all appointments, and call your doctor if you are having problems. It's also a good idea to know your test results and keep a list of the medicines you take. How can you care for yourself at home? Following the DASH diet · Eat 4 to 5 servings of fruit each day. A serving is 1 medium-sized piece of fruit, ½ cup chopped or canned fruit, 1/4 cup dried fruit, or 4 ounces (½ cup) of fruit juice. Choose fruit more often than fruit juice. · Eat 4 to 5 servings of vegetables each day. A serving is 1 cup of lettuce or raw leafy vegetables, ½ cup of chopped or cooked vegetables, or 4 ounces (½ cup) of vegetable juice. Choose vegetables more often than vegetable juice. · Get 2 to 3 servings of low-fat and fat-free dairy each day. A serving is 8 ounces of milk, 1 cup of yogurt, or 1 ½ ounces of cheese. · Eat 6 to 8 servings of grains each day.  A serving is 1 slice of bread, 1 ounce of dry cereal, or ½ cup of cooked rice, pasta, or cooked cereal. Try to choose whole-grain products as much as possible. · Limit lean meat, poultry, and fish to 2 servings each day. A serving is 3 ounces, about the size of a deck of cards. · Eat 4 to 5 servings of nuts, seeds, and legumes (cooked dried beans, lentils, and split peas) each week. A serving is 1/3 cup of nuts, 2 tablespoons of seeds, or ½ cup of cooked beans or peas. · Limit fats and oils to 2 to 3 servings each day. A serving is 1 teaspoon of vegetable oil or 2 tablespoons of salad dressing. · Limit sweets and added sugars to 5 servings or less a week. A serving is 1 tablespoon jelly or jam, ½ cup sorbet, or 1 cup of lemonade. · Eat less than 2,300 milligrams (mg) of sodium a day. If you limit your sodium to 1,500 mg a day, you can lower your blood pressure even more. Tips for success · Start small. Do not try to make dramatic changes to your diet all at once. You might feel that you are missing out on your favorite foods and then be more likely to not follow the plan. Make small changes, and stick with them. Once those changes become habit, add a few more changes. · Try some of the following: ? Make it a goal to eat a fruit or vegetable at every meal and at snacks. This will make it easy to get the recommended amount of fruits and vegetables each day. ? Try yogurt topped with fruit and nuts for a snack or healthy dessert. ? Add lettuce, tomato, cucumber, and onion to sandwiches. ? Combine a ready-made pizza crust with low-fat mozzarella cheese and lots of vegetable toppings. Try using tomatoes, squash, spinach, broccoli, carrots, cauliflower, and onions. ? Have a variety of cut-up vegetables with a low-fat dip as an appetizer instead of chips and dip. ? Sprinkle sunflower seeds or chopped almonds over salads. Or try adding chopped walnuts or almonds to cooked vegetables.  
? Try some vegetarian meals using beans and peas. Add garbanzo or kidney beans to salads. Make burritos and tacos with mashed trinidad beans or black beans. Where can you learn more? Go to http://www.gray.com/ Enter X829 in the search box to learn more about \"DASH Diet: Care Instructions. \" Current as of: December 16, 2019               Content Version: 12.6 © 3945-8306 [employer] Care instructions adapted under license by [employer] (which disclaims liability or warranty for this information). If you have questions about a medical condition or this instruction, always ask your healthcare professional. [address] any warranty or liability for your use of this information.

## 2021-03-13 NOTE — PROGRESS NOTES
HPI:  Patrick Blanco is a 50 y.o. female who presents today with   Chief Complaint   Patient presents with    Hypertension    Cholesterol Problem        PT has been well;   Has no new complaints  On meds as listed below  Tolerates med well;             3 most recent PHQ Screens 3/8/2021   Little interest or pleasure in doing things Not at all   Feeling down, depressed, irritable, or hopeless Not at all   Total Score PHQ 2 0               PMH,  Meds, Allergies, Family History, Social history reviewed      Current Outpatient Medications   Medication Sig Dispense Refill    hydroCHLOROthiazide (HYDRODIURIL) 25 mg tablet Take 1 Tab by mouth daily. 90 Tab 3    simvastatin (ZOCOR) 40 mg tablet Take 1 Tab by mouth nightly. 90 Tab 3    medroxyprogesterone (DEPO-PROVERA) 150 mg/mL Syrg 150 mg by IntraMUSCular route every three (3) months.  MULTIVITAMINS (MULTI-VITAMIN PO) Take 1 Tab by mouth daily. No Known Allergies               Review of Systems   Constitutional: Negative for chills and fever. Respiratory: Negative for shortness of breath and wheezing. Cardiovascular: Negative for chest pain and palpitations.          Visit Vitals  /72 (BP 1 Location: Left arm, BP Patient Position: Sitting, BP Cuff Size: Adult)   Pulse 97   Temp 97.6 °F (36.4 °C) (Temporal)   Resp 20   Ht 5' 6\" (1.676 m)   Wt 179 lb (81.2 kg)   BMI 28.89 kg/m²     Physical Exam   Visit Vitals  /72 (BP 1 Location: Left arm, BP Patient Position: Sitting, BP Cuff Size: Adult)   Pulse 97   Temp 97.6 °F (36.4 °C) (Temporal)   Resp 20   Ht 5' 6\" (1.676 m)   Wt 179 lb (81.2 kg)   BMI 28.89 kg/m²     General appearance: alert, cooperative, no distress, appears stated age  Neck: supple, symmetrical, trachea midline, no adenopathy, thyroid: not enlarged, symmetric, no tenderness/mass/nodules, no carotid bruit and no JVD  Lungs: clear to auscultation bilaterally  Heart: regular rate and rhythm, S1, S2 normal, no murmur, click, rub or gallop  Extremities: extremities normal, atraumatic, no cyanosis or edema    Lab Results   Component Value Date/Time    Cholesterol, total 195 11/16/2020 09:16 AM    HDL Cholesterol 45 11/16/2020 09:16 AM    LDL, calculated 141 (H) 11/16/2020 09:16 AM    VLDL, calculated 9 11/16/2020 09:16 AM    Triglyceride 45 11/16/2020 09:16 AM    CHOL/HDL Ratio 4.3 11/16/2020 09:16 AM     Lab Results   Component Value Date/Time    Sodium 141 11/16/2020 09:16 AM    Potassium 3.3 (L) 11/16/2020 09:16 AM    Chloride 106 11/16/2020 09:16 AM    CO2 30 11/16/2020 09:16 AM    Anion gap 5 11/16/2020 09:16 AM    Glucose 97 11/16/2020 09:16 AM    BUN 10 11/16/2020 09:16 AM    Creatinine 0.92 11/16/2020 09:16 AM    BUN/Creatinine ratio 11 (L) 11/16/2020 09:16 AM    GFR est AA >60 11/16/2020 09:16 AM    GFR est non-AA >60 11/16/2020 09:16 AM    Calcium 9.3 11/16/2020 09:16 AM       Assessment/Plan:    1. Elevated blood sugar  stable  - HEMOGLOBIN A1C WITH EAG; Future    2. Essential hypertension  stable  - CBC WITH AUTOMATED DIFF; Future  - METABOLIC PANEL, COMPREHENSIVE; Future  - TSH 3RD GENERATION; Future    3. Hypercholesterolemia  stable  - LIPID PANEL; Future     As above,   above all stable unless otherwise noted   treatment plan as listed below  Orders Placed This Encounter    CBC WITH AUTOMATED DIFF    METABOLIC PANEL, COMPREHENSIVE    LIPID PANEL    HEMOGLOBIN A1C WITH EAG    TSH 3RD GENERATION     Labs as ordered  This has been fully explained to the patient, who indicates understanding. Follow-up and Dispositions    · Return in about 6 months (around 9/8/2021) for htn.             Shae Wilkins MD

## 2021-03-15 ENCOUNTER — HOSPITAL ENCOUNTER (OUTPATIENT)
Dept: LAB | Age: 56
Discharge: HOME | End: 2021-03-15
Payer: OTHER GOVERNMENT

## 2021-03-15 ENCOUNTER — APPOINTMENT (OUTPATIENT)
Dept: FAMILY MEDICINE CLINIC | Age: 56
End: 2021-03-15

## 2021-03-15 DIAGNOSIS — I10 ESSENTIAL HYPERTENSION: ICD-10-CM

## 2021-03-15 DIAGNOSIS — E78.00 HYPERCHOLESTEROLEMIA: ICD-10-CM

## 2021-03-15 DIAGNOSIS — R73.9 ELEVATED BLOOD SUGAR: ICD-10-CM

## 2021-03-15 LAB
ALBUMIN SERPL-MCNC: 4 G/DL (ref 3.4–5)
ALBUMIN/GLOB SERPL: 1.1 {RATIO} (ref 0.8–1.7)
ALP SERPL-CCNC: 75 U/L (ref 45–117)
ALT SERPL-CCNC: 19 U/L (ref 13–56)
ANION GAP SERPL CALC-SCNC: 4 MMOL/L (ref 3–18)
AST SERPL-CCNC: 14 U/L (ref 10–38)
BASOPHILS # BLD: 0 K/UL (ref 0–0.1)
BASOPHILS NFR BLD: 0 % (ref 0–2)
BILIRUB SERPL-MCNC: 0.6 MG/DL (ref 0.2–1)
BUN SERPL-MCNC: 13 MG/DL (ref 7–18)
BUN/CREAT SERPL: 15 (ref 12–20)
CALCIUM SERPL-MCNC: 9 MG/DL (ref 8.5–10.1)
CHLORIDE SERPL-SCNC: 106 MMOL/L (ref 100–111)
CHOLEST SERPL-MCNC: 190 MG/DL
CO2 SERPL-SCNC: 31 MMOL/L (ref 21–32)
CREAT SERPL-MCNC: 0.85 MG/DL (ref 0.6–1.3)
DIFFERENTIAL METHOD BLD: NORMAL
EOSINOPHIL # BLD: 0.1 K/UL (ref 0–0.4)
EOSINOPHIL NFR BLD: 1 % (ref 0–5)
ERYTHROCYTE [DISTWIDTH] IN BLOOD BY AUTOMATED COUNT: 14.4 % (ref 11.6–14.5)
EST. AVERAGE GLUCOSE BLD GHB EST-MCNC: 114 MG/DL
GLOBULIN SER CALC-MCNC: 3.7 G/DL (ref 2–4)
GLUCOSE SERPL-MCNC: 98 MG/DL (ref 74–99)
HBA1C MFR BLD: 5.6 % (ref 4.2–5.6)
HCT VFR BLD AUTO: 37.8 % (ref 35–45)
HDLC SERPL-MCNC: 41 MG/DL (ref 40–60)
HDLC SERPL: 4.6 {RATIO} (ref 0–5)
HGB BLD-MCNC: 12.4 G/DL (ref 12–16)
LDLC SERPL CALC-MCNC: 140 MG/DL (ref 0–100)
LIPID PROFILE,FLP: ABNORMAL
LYMPHOCYTES # BLD: 3 K/UL (ref 0.9–3.6)
LYMPHOCYTES NFR BLD: 34 % (ref 21–52)
MCH RBC QN AUTO: 28.3 PG (ref 24–34)
MCHC RBC AUTO-ENTMCNC: 32.8 G/DL (ref 31–37)
MCV RBC AUTO: 86.3 FL (ref 74–97)
MONOCYTES # BLD: 0.4 K/UL (ref 0.05–1.2)
MONOCYTES NFR BLD: 4 % (ref 3–10)
NEUTS SEG # BLD: 5.5 K/UL (ref 1.8–8)
NEUTS SEG NFR BLD: 61 % (ref 40–73)
PLATELET # BLD AUTO: 293 K/UL (ref 135–420)
PMV BLD AUTO: 10 FL (ref 9.2–11.8)
POTASSIUM SERPL-SCNC: 3.2 MMOL/L (ref 3.5–5.5)
PROT SERPL-MCNC: 7.7 G/DL (ref 6.4–8.2)
RBC # BLD AUTO: 4.38 M/UL (ref 4.2–5.3)
SODIUM SERPL-SCNC: 141 MMOL/L (ref 136–145)
TRIGL SERPL-MCNC: 45 MG/DL (ref ?–150)
TSH SERPL DL<=0.05 MIU/L-ACNC: 0.61 UIU/ML (ref 0.36–3.74)
VLDLC SERPL CALC-MCNC: 9 MG/DL
WBC # BLD AUTO: 8.9 K/UL (ref 4.6–13.2)

## 2021-03-15 PROCEDURE — 83036 HEMOGLOBIN GLYCOSYLATED A1C: CPT

## 2021-03-15 PROCEDURE — 84443 ASSAY THYROID STIM HORMONE: CPT

## 2021-03-15 PROCEDURE — 85025 COMPLETE CBC W/AUTO DIFF WBC: CPT

## 2021-03-15 PROCEDURE — 36415 COLL VENOUS BLD VENIPUNCTURE: CPT

## 2021-03-15 PROCEDURE — 80061 LIPID PANEL: CPT

## 2021-03-15 PROCEDURE — 80053 COMPREHEN METABOLIC PANEL: CPT

## 2021-04-16 ENCOUNTER — TRANSCRIBE ORDER (OUTPATIENT)
Dept: SCHEDULING | Age: 56
End: 2021-04-16

## 2021-04-16 DIAGNOSIS — M50.20 HNP (HERNIATED NUCLEUS PULPOSUS), CERVICAL: ICD-10-CM

## 2021-04-16 DIAGNOSIS — M54.12 CERVICAL RADICULOPATHY: Primary | ICD-10-CM

## 2021-04-23 NOTE — PROGRESS NOTES
Reviewed with pt. Will check labs again. Check a1c.   Lower sugar diet advised axillary axillary axillary

## 2021-05-03 ENCOUNTER — HOSPITAL ENCOUNTER (OUTPATIENT)
Dept: GENERAL RADIOLOGY | Age: 56
Discharge: HOME | End: 2021-05-03
Payer: OTHER GOVERNMENT

## 2021-05-03 DIAGNOSIS — M54.12 CERVICAL RADICULOPATHY: ICD-10-CM

## 2021-05-03 PROCEDURE — 72050 X-RAY EXAM NECK SPINE 4/5VWS: CPT

## 2021-05-04 ENCOUNTER — HOSPITAL ENCOUNTER (OUTPATIENT)
Age: 56
Discharge: HOME | End: 2021-05-04
Attending: ORTHOPAEDIC SURGERY
Payer: OTHER GOVERNMENT

## 2021-05-04 DIAGNOSIS — M54.12 CERVICAL RADICULOPATHY: ICD-10-CM

## 2021-05-04 DIAGNOSIS — M50.20 HNP (HERNIATED NUCLEUS PULPOSUS), CERVICAL: ICD-10-CM

## 2021-05-04 PROCEDURE — 72141 MRI NECK SPINE W/O DYE: CPT

## 2021-06-02 ENCOUNTER — TRANSCRIBE ORDER (OUTPATIENT)
Dept: REGISTRATION | Age: 56
End: 2021-06-02

## 2021-06-02 ENCOUNTER — HOSPITAL ENCOUNTER (OUTPATIENT)
Dept: PREADMISSION TESTING | Age: 56
Discharge: HOME | End: 2021-06-02
Payer: OTHER GOVERNMENT

## 2021-06-02 DIAGNOSIS — Z01.818 PRE-OP EXAM: Primary | ICD-10-CM

## 2021-06-02 DIAGNOSIS — Z01.818 PRE-OP EXAM: ICD-10-CM

## 2021-06-02 LAB
ANION GAP SERPL CALC-SCNC: 5 MMOL/L (ref 3–18)
ATRIAL RATE: 94 BPM
BUN SERPL-MCNC: 16 MG/DL (ref 7–18)
BUN/CREAT SERPL: 19 (ref 12–20)
CALCIUM SERPL-MCNC: 9.3 MG/DL (ref 8.5–10.1)
CALCULATED P AXIS, ECG09: 55 DEGREES
CALCULATED R AXIS, ECG10: 38 DEGREES
CALCULATED T AXIS, ECG11: 54 DEGREES
CHLORIDE SERPL-SCNC: 104 MMOL/L (ref 100–111)
CO2 SERPL-SCNC: 30 MMOL/L (ref 21–32)
CREAT SERPL-MCNC: 0.84 MG/DL (ref 0.6–1.3)
DIAGNOSIS, 93000: NORMAL
ERYTHROCYTE [DISTWIDTH] IN BLOOD BY AUTOMATED COUNT: 13.8 % (ref 11.6–14.5)
GLUCOSE SERPL-MCNC: 122 MG/DL (ref 74–99)
HCT VFR BLD AUTO: 37.4 % (ref 35–45)
HGB BLD-MCNC: 12.3 G/DL (ref 12–16)
MCH RBC QN AUTO: 28.3 PG (ref 24–34)
MCHC RBC AUTO-ENTMCNC: 32.9 G/DL (ref 31–37)
MCV RBC AUTO: 86 FL (ref 74–97)
P-R INTERVAL, ECG05: 140 MS
PLATELET # BLD AUTO: 271 K/UL (ref 135–420)
PMV BLD AUTO: 9.4 FL (ref 9.2–11.8)
POTASSIUM SERPL-SCNC: 3.1 MMOL/L (ref 3.5–5.5)
Q-T INTERVAL, ECG07: 374 MS
QRS DURATION, ECG06: 90 MS
QTC CALCULATION (BEZET), ECG08: 467 MS
RBC # BLD AUTO: 4.35 M/UL (ref 4.2–5.3)
SODIUM SERPL-SCNC: 139 MMOL/L (ref 136–145)
VENTRICULAR RATE, ECG03: 94 BPM
WBC # BLD AUTO: 9.2 K/UL (ref 4.6–13.2)

## 2021-06-02 PROCEDURE — 93005 ELECTROCARDIOGRAM TRACING: CPT

## 2021-06-02 PROCEDURE — 36415 COLL VENOUS BLD VENIPUNCTURE: CPT

## 2021-06-02 PROCEDURE — U0003 INFECTIOUS AGENT DETECTION BY NUCLEIC ACID (DNA OR RNA); SEVERE ACUTE RESPIRATORY SYNDROME CORONAVIRUS 2 (SARS-COV-2) (CORONAVIRUS DISEASE [COVID-19]), AMPLIFIED PROBE TECHNIQUE, MAKING USE OF HIGH THROUGHPUT TECHNOLOGIES AS DESCRIBED BY CMS-2020-01-R: HCPCS

## 2021-06-02 PROCEDURE — 80048 BASIC METABOLIC PNL TOTAL CA: CPT

## 2021-06-02 PROCEDURE — 85027 COMPLETE CBC AUTOMATED: CPT

## 2021-06-03 ENCOUNTER — OFFICE VISIT (OUTPATIENT)
Dept: FAMILY MEDICINE CLINIC | Age: 56
End: 2021-06-03
Payer: OTHER GOVERNMENT

## 2021-06-03 VITALS
HEIGHT: 66 IN | DIASTOLIC BLOOD PRESSURE: 77 MMHG | WEIGHT: 178 LBS | TEMPERATURE: 97.1 F | SYSTOLIC BLOOD PRESSURE: 128 MMHG | HEART RATE: 100 BPM | OXYGEN SATURATION: 98 % | RESPIRATION RATE: 18 BRPM | BODY MASS INDEX: 28.61 KG/M2

## 2021-06-03 DIAGNOSIS — Z01.818 PREOPERATIVE CLEARANCE: ICD-10-CM

## 2021-06-03 DIAGNOSIS — G95.9 CERVICAL MYELOPATHY (HCC): Primary | ICD-10-CM

## 2021-06-03 DIAGNOSIS — E87.6 HYPOKALEMIA: ICD-10-CM

## 2021-06-03 DIAGNOSIS — I10 ESSENTIAL HYPERTENSION: ICD-10-CM

## 2021-06-03 LAB — SARS-COV-2, COV2NT: NOT DETECTED

## 2021-06-03 PROCEDURE — 99214 OFFICE O/P EST MOD 30 MIN: CPT | Performed by: NURSE PRACTITIONER

## 2021-06-03 RX ORDER — POTASSIUM CHLORIDE 750 MG/1
10 TABLET, EXTENDED RELEASE ORAL DAILY
Qty: 7 TABLET | Refills: 0 | Status: SHIPPED | OUTPATIENT
Start: 2021-06-03 | End: 2021-06-10

## 2021-06-03 NOTE — PROGRESS NOTES
Preoperative Evaluation    Date of Exam: 6/3/2021    Lucio Denton is a 49 y.o. female (:1965) who presents for preoperative evaluation. Procedure/Surgery: Anterior Cervical Discectomy with Fusion  Date of Procedure/Surgery: 2021  Surgeon: Laurie Dalton MD  Hospital/Surgical Facility: [facility]  Primary Physician: Tonio Diamond MD  Latex Allergy: no    Reports she has been doing well. She does have paresthesias in left arm. Denies weakness. Problem List:     Patient Active Problem List    Diagnosis Date Noted    Multiple thyroid nodules 2016    Nausea     Facet arthropathy     Heart murmur     Iritis      Medical History:     Past Medical History:   Diagnosis Date    Facet arthropathy     Heart murmur 2006    High cholesterol     Hypertension     Iritis     Low back pain     Nausea     Spinal stenosis     Thyroid disease     Goiter      Allergies:   No Known Allergies   Medications:     Current Outpatient Medications   Medication Sig    hydroCHLOROthiazide (HYDRODIURIL) 25 mg tablet Take 1 Tab by mouth daily.  simvastatin (ZOCOR) 40 mg tablet Take 1 Tab by mouth nightly.  medroxyprogesterone (DEPO-PROVERA) 150 mg/mL Syrg 150 mg by IntraMUSCular route every three (3) months.  MULTIVITAMINS (MULTI-VITAMIN PO) Take 1 Tab by mouth daily. No current facility-administered medications for this visit. Surgical History:     Past Surgical History:   Procedure Laterality Date    HX ORTHOPAEDIC  2009    back surgery     Social History:     Social History     Socioeconomic History    Marital status:      Spouse name: Not on file    Number of children: Not on file    Years of education: Not on file    Highest education level: Not on file   Tobacco Use    Smoking status: Never Smoker    Smokeless tobacco: Never Used   Vaping Use    Vaping Use: Never used   Substance and Sexual Activity    Alcohol use: No    Drug use:  No Other Topics Concern    Caffeine Concern Yes     Comment: 4 oz three times a week    Exercise Yes     Comment: walks 30 minutes 3 to 4 times a week or 5,000 steps a day     Seat Belt Yes     Social Determinants of Health     Financial Resource Strain:     Difficulty of Paying Living Expenses:    Food Insecurity:     Worried About Running Out of Food in the Last Year:     [address] in the Last Year:    Transportation Needs:     Lack of Transportation (Medical):  Lack of Transportation (Non-Medical):    Physical Activity:     Days of Exercise per Week:     Minutes of Exercise per Session:    Stress:     Feeling of Stress :    Social Connections:     Frequency of Communication with Friends and Family:     Frequency of Social Gatherings with Friends and Family:     Attends [employer] Services:     Active Member of Clubs or Organizations:     Attends Club or Organization Meetings:     Marital Status:        Recent use of: No recent use of aspirin (ASA), NSAIDS or steroids    Tetanus up to date: tetanus status unknown to the patient      Anesthesia Complications: None  History of abnormal bleeding : None  History of Blood Transfusions: no  Health Care Directive or Living Will: no    REVIEW OF SYSTEMS:  Review of Systems   Constitutional: Negative for activity change, appetite change, chills, fatigue and fever. Respiratory: Negative for chest tightness and shortness of breath. Cardiovascular: Negative for chest pain. Neurological: Positive for numbness (left arm). Negative for dizziness, weakness and headaches. EXAM:   Visit Vitals  /77 (BP 1 Location: Left arm, BP Patient Position: Sitting, BP Cuff Size: Adult)   Pulse 100   Temp 97.1 °F (36.2 °C) (Temporal)   Resp 18   Ht 5' 6\" (1.676 m)   Wt 178 lb (80.7 kg)   SpO2 98%   BMI 28.73 kg/m²     Physical Exam  Constitutional:       General: She is not in acute distress. Appearance: She is well-developed.    HENT:      Head: Normocephalic and atraumatic. Nose: Nose normal.      Mouth/Throat:      Mouth: Mucous membranes are moist.      Pharynx: Oropharynx is clear. Neck:      Vascular: No carotid bruit. Cardiovascular:      Rate and Rhythm: Normal rate and regular rhythm. Heart sounds: Normal heart sounds. No murmur heard. No friction rub. No gallop. Pulmonary:      Effort: Pulmonary effort is normal.      Breath sounds: Normal breath sounds. No decreased breath sounds, wheezing, rhonchi or rales. Abdominal:      General: Bowel sounds are normal.      Palpations: Abdomen is soft. Tenderness: There is no abdominal tenderness. Musculoskeletal:      Cervical back: Normal range of motion and neck supple. Lymphadenopathy:      Cervical: No cervical adenopathy. Skin:     General: Skin is warm and dry. Neurological:      Mental Status: She is alert and oriented to person, place, and time. DIAGNOSTICS:   Hospital Outpatient Visit on 06/02/2021   Component Date Value Ref Range Status    Ventricular Rate 06/02/2021 94  BPM Final    Atrial Rate 06/02/2021 94  BPM Final    P-R Interval 06/02/2021 140  ms Final    QRS Duration 06/02/2021 90  ms Final    Q-T Interval 06/02/2021 374  ms Final    QTC Calculation (Bezet) 06/02/2021 467  ms Final    Calculated P Axis 06/02/2021 55  degrees Final    Calculated R Axis 06/02/2021 38  degrees Final    Calculated T Axis 06/02/2021 54  degrees Final    Diagnosis 06/02/2021    Final                    Value:Normal sinus rhythm  Possible Left atrial enlargement  Borderline ECG  When compared with ECG of 01-JUN-2011 08:39,  No significant change was found  Confirmed by Mahamed Urbina MD, Margarita Aguilar ([id number]) on 6/2/2021 3:08:18 PM      SARS-CoV-2 06/02/2021 Not Detected  Not Detected   Final    Comment: (NOTE)  This nucleic acid amplification test was developed and its  performance characteristics determined by [employer].   Nucleic acid amplification tests include RT-PCR and TMA. This test  has not been FDA cleared or approved. This test has been authorized  by FDA under an Emergency Use Authorization (EUA). This test is only  authorized for the duration of time the declaration that  circumstances exist justifying the authorization of the emergency use  of in vitro diagnostic tests for detection of SARS-CoV-2 virus and/or  diagnosis of COVID-19 infection under section 564(b)(1) of the Act,  21 U. S.C. 365TDG-0(E) (1), unless the authorization is terminated or  revoked sooner. When diagnostic testing is negative, the possibility of a false  negative result should be considered in the context of a patient's  recent exposures and the presence of clinical signs and symptoms  consistent with COVID-19. An individual without symptoms of COVID-  19 and who is not shedding SARS-CoV-2                            virus would expect to have a  negative (not detected) result in this assay.   Performed At: [address] [id number]  Boy Joy MD TQ:[id number]      WBC 06/02/2021 9.2  4.6 - 13.2 K/uL Final    RBC 06/02/2021 4.35  4.20 - 5.30 M/uL Final    HGB 06/02/2021 12.3  12.0 - 16.0 g/dL Final    HCT 06/02/2021 37.4  35.0 - 45.0 % Final    MCV 06/02/2021 86.0  74.0 - 97.0 FL Final    MCH 06/02/2021 28.3  24.0 - 34.0 PG Final    MCHC 06/02/2021 32.9  31.0 - 37.0 g/dL Final    RDW 06/02/2021 13.8  11.6 - 14.5 % Final    PLATELET 73/45/8450 880  135 - 420 K/uL Final    MPV 06/02/2021 9.4  9.2 - 11.8 FL Final    Sodium 06/02/2021 139  136 - 145 mmol/L Final    Potassium 06/02/2021 3.1* 3.5 - 5.5 mmol/L Final    Chloride 06/02/2021 104  100 - 111 mmol/L Final    CO2 06/02/2021 30  21 - 32 mmol/L Final    Anion gap 06/02/2021 5  3.0 - 18 mmol/L Final    Glucose 06/02/2021 122* 74 - 99 mg/dL Final    BUN 06/02/2021 16  7.0 - 18 MG/DL Final    Creatinine 06/02/2021 0.84  0.6 - 1.3 MG/DL Final    BUN/Creatinine ratio 06/02/2021 19  12 - 20   Final    GFR est AA 06/02/2021 >60  >60 ml/min/1.73m2 Final    GFR est non-AA 06/02/2021 >60  >60 ml/min/1.73m2 Final    Comment: (NOTE)  Estimated GFR is calculated using the Modification of Diet in Renal   Disease (MDRD) Study equation, reported for both  Americans   (GFRAA) and non- Americans (GFRNA), and normalized to 1.73m2   body surface area. The physician must decide which value applies to   the patient. The MDRD study equation should only be used in   individuals age 25 or older. It has not been validated for the   following: pregnant women, patients with serious comorbid conditions,   or on certain medications, or persons with extremes of body size,   muscle mass, or nutritional status.  Calcium 06/02/2021 9.3  8.5 - 10.1 MG/DL Final       IMPRESSION:     ICD-10-CM ICD-9-CM    1. Cervical myelopathy (HCC)  G95.9 721.1    2. Preoperative clearance  Z01.818 V72.84    3. Hypokalemia  E87.6 276.8 potassium chloride (KLOR-CON) 10 mEq tablet      METABOLIC PANEL, BASIC   4.  Essential hypertension  I10 401.9      Orders Placed This Encounter    METABOLIC PANEL, BASIC    potassium chloride (KLOR-CON) 10 mEq tablet       No contraindications to planned surgery    Constantin Soriano NP   6/3/2021          Aris Adame MD

## 2021-06-03 NOTE — PATIENT INSTRUCTIONS
Hypokalemia: Care Instructions Your Care Instructions Hypokalemia (say \"lp-yr-mjy-JAYLIN-bibi-uh\") is a low level of potassium. The heart, muscles, kidneys, and nervous system all need potassium to work well. This problem has many different causes. Kidney problems, diet, and medicines like diuretics and laxatives can cause it. So can vomiting or diarrhea. In some cases, cancer is the cause. Your doctor may do tests to find the cause of your low potassium levels. You may need medicines to bring your potassium levels back to normal. You may also need regular blood tests to check your potassium. If you have very low potassium, you may need intravenous (IV) medicines. You also may need tests to check the electrical activity of your heart. Heart problems caused by low potassium levels can be very serious. Follow-up care is a key part of your treatment and safety. Be sure to make and go to all appointments, and call your doctor if you are having problems. It's also a good idea to know your test results and keep a list of the medicines you take. How can you care for yourself at home? · If your doctor recommends it, eat foods that have a lot of potassium. These include fresh fruits, juices, and vegetables. They also include nuts, beans, and milk. · Be safe with medicines. If your doctor prescribes medicines or potassium supplements, take them exactly as directed. Call your doctor if you have any problems with your medicines. · Get your potassium levels tested as often as your doctor tells you. When should you call for help? Call 911 anytime you think you may need emergency care. For example, call if: 
  · You feel like your heart is missing beats. Heart problems caused by low potassium can cause death.  
  · You passed out (lost consciousness).  
  · You have a seizure. Call your doctor now or seek immediate medical care if: 
  · You feel weak or unusually tired.  
  · You have severe arm or leg cramps.   · You have tingling or numbness.  
  · You feel sick to your stomach, or you vomit.  
  · You have belly cramps.  
  · You feel bloated or constipated.  
  · You have to urinate a lot.  
  · You feel very thirsty most of the time.  
  · You are dizzy or lightheaded, or you feel like you may faint.  
  · You feel depressed, or you lose touch with reality. Watch closely for changes in your health, and be sure to contact your doctor if: 
  · You do not get better as expected. Where can you learn more? Go to http://www.gray.com/ Enter G358 in the search box to learn more about \"Hypokalemia: Care Instructions. \" Current as of: March 31, 2020               Content Version: 12.8 © 3231-6611 [employer]. Care instructions adapted under license by [employer] (which disclaims liability or warranty for this information). If you have questions about a medical condition or this instruction, always ask your healthcare professional. Saundra Calderon any warranty or liability for your use of this information. Learning About High-Potassium Foods What foods are high in potassium? The foods you eat contain nutrients, such as vitamins and minerals. Potassium is a nutrient. Your body needs the right amount to stay healthy and work as it should. You can use the list below to help you make choices about which foods to eat. Foods are high in potassium if they have more than 200 mg per serving. Fruits · Apricots, 2 raw · Avocado, ½ fruit · Banana, 1 medium · [place], 1 fruit · Nectarine, 1 fruit · Orange, 1 fruit · Prunes, 5 fruits · Raisins, ¼ cup Vegetables · Artichoke, 1 medium · Beets, ½ cup · Broccoli, ½ cup · Kale (raw), 1 cup · Potato with skin, 1 medium · Spinach, ½ cup · Sweet potato, 1 medium · Tomato sauce, ½ cup · Zucchini, ½ cup Dairy and dairy alternatives · Milk, 1 cup · Soy milk, 1 cup · Yogurt, 6 oz Meats and other protein foods · Beans (lima, navy, white), ½ cup · Beef, ground, 3 oz · Chicken, 3 oz · Fish (halibut, tuna, cod, snapper), 3 oz · Nuts (almonds, hazelnuts, [place], cashew, pistachios), 1 oz · Peanut butter, 2 Tbsp · Peanuts, 1 oz · [place] 3 oz Seasonings · Salt substitutes Work with your doctor to find out how much of this nutrient you need. Depending on your health, you may need more or less of it in your diet. Where can you learn more? Go to http://www.gray.com/ Enter P450 in the search box to learn more about \"Learning About High-Potassium Foods. \" Current as of: December 17, 2020               Content Version: 12.8 © 5992-1768 [employer]. Care instructions adapted under license by [employer] (which disclaims liability or warranty for this information). If you have questions about a medical condition or this instruction, always ask your healthcare professional. [place] [address] any warranty or liability for your use of this information. Potassium-Rich Diet: Care Instructions Your Care Instructions Potassium is a mineral. It helps keep the right mix of fluids in your body. It also helps your nerves and muscles work as they should. You'll find it in milk and meats. It's also in all fresh foods, including fruits and vegetables. Most adults need about 5 grams of potassium a day. The foods you eat should supply all that you need. Some health conditions can cause a loss of potassium. For example, kidney problems and stomach problems with vomiting and diarrhea can cause you to lose this mineral. Some medicines, such as water pills (diuretics), can cause low potassium. If you can't get enough potassium from what you eat, your doctor may advise you to take supplements. Follow-up care is a key part of your treatment and safety.  Be sure to make and go to all appointments, and call your doctor if you are having problems. It's also a good idea to know your test results and keep a list of the medicines you take. How can you care for yourself at home? · Plan your diet around foods that are rich in potassium. Fresh, unprocessed whole foods have the most. These foods include: ? Milk and other dairy products. ? Vegetables, especially broccoli, cooked dry beans, tomatoes, potatoes, artichokes, winter squash, and spinach. ? Fruits, especially citrus fruits, bananas, and apricots. Dried apricots contain more potassium than fresh apricots. ? Meat, poultry, and fish. · Ask your doctor about using a salt substitute or \"light\" salt. These often contain potassium. Where can you learn more? Go to http://www.gray.com/ Enter H315 in the search box to learn more about \"Potassium-Rich Diet: Care Instructions. \" Current as of: December 17, 2020               Content Version: 12.8 © 2006-2021 [employer]. Care instructions adapted under license by [employer] (which disclaims liability or warranty for this information). If you have questions about a medical condition or this instruction, always ask your healthcare professional. [place] [address] any warranty or liability for your use of this information.

## 2021-06-04 ENCOUNTER — TELEPHONE (OUTPATIENT)
Dept: FAMILY MEDICINE CLINIC | Age: 56
End: 2021-06-04

## 2021-06-04 ENCOUNTER — ANESTHESIA EVENT (OUTPATIENT)
Dept: SURGERY | Age: 56
End: 2021-06-04
Payer: OTHER GOVERNMENT

## 2021-06-04 NOTE — TELEPHONE ENCOUNTER
Kenn Citizen called form  office requesting update on pt pre op yinka. Pt is scheduled for surgery on Monday June 7th.     Please advise  [phone]  Ext 21

## 2021-06-07 ENCOUNTER — APPOINTMENT (OUTPATIENT)
Dept: GENERAL RADIOLOGY | Age: 56
End: 2021-06-07
Attending: ORTHOPAEDIC SURGERY
Payer: OTHER GOVERNMENT

## 2021-06-07 ENCOUNTER — ANESTHESIA (OUTPATIENT)
Dept: SURGERY | Age: 56
End: 2021-06-07
Payer: OTHER GOVERNMENT

## 2021-06-07 ENCOUNTER — HOSPITAL ENCOUNTER (OUTPATIENT)
Age: 56
Setting detail: OUTPATIENT SURGERY
Discharge: HOME | End: 2021-06-08
Attending: ORTHOPAEDIC SURGERY | Admitting: ORTHOPAEDIC SURGERY
Payer: OTHER GOVERNMENT

## 2021-06-07 DIAGNOSIS — Z98.1 S/P CERVICAL SPINAL FUSION: Primary | ICD-10-CM

## 2021-06-07 PROBLEM — M47.12 CERVICAL SPONDYLOSIS WITH MYELOPATHY: Status: ACTIVE | Noted: 2021-06-07

## 2021-06-07 LAB
ANION GAP SERPL CALC-SCNC: 6 MMOL/L (ref 3–18)
BUN SERPL-MCNC: 11 MG/DL (ref 7–18)
BUN/CREAT SERPL: 15 (ref 12–20)
CALCIUM SERPL-MCNC: 9.1 MG/DL (ref 8.5–10.1)
CHLORIDE SERPL-SCNC: 107 MMOL/L (ref 100–111)
CO2 SERPL-SCNC: 27 MMOL/L (ref 21–32)
CREAT SERPL-MCNC: 0.75 MG/DL (ref 0.6–1.3)
GLUCOSE SERPL-MCNC: 111 MG/DL (ref 74–99)
HCG UR QL: NEGATIVE
POTASSIUM SERPL-SCNC: 3.4 MMOL/L (ref 3.5–5.5)
SODIUM SERPL-SCNC: 140 MMOL/L (ref 136–145)

## 2021-06-07 PROCEDURE — 77030014005 HC SPNG HEMSTAT GEL CARD -B: Performed by: ORTHOPAEDIC SURGERY

## 2021-06-07 PROCEDURE — 74011000250 HC RX REV CODE- 250: Performed by: ORTHOPAEDIC SURGERY

## 2021-06-07 PROCEDURE — 77030029372 HC ADH SKN CLSR PRINEO J&J -C: Performed by: ORTHOPAEDIC SURGERY

## 2021-06-07 PROCEDURE — 97161 PT EVAL LOW COMPLEX 20 MIN: CPT

## 2021-06-07 PROCEDURE — 00600 ANES PX CRV SPINE&CORD NOS: CPT | Performed by: ANESTHESIOLOGY

## 2021-06-07 PROCEDURE — 77030027138 HC INCENT SPIROMETER -A: Performed by: ORTHOPAEDIC SURGERY

## 2021-06-07 PROCEDURE — C1713 ANCHOR/SCREW BN/BN,TIS/BN: HCPCS | Performed by: ORTHOPAEDIC SURGERY

## 2021-06-07 PROCEDURE — 77030029099 HC BN WAX SSPC -A: Performed by: ORTHOPAEDIC SURGERY

## 2021-06-07 PROCEDURE — 76010000131 HC OR TIME 2 TO 2.5 HR: Performed by: ORTHOPAEDIC SURGERY

## 2021-06-07 PROCEDURE — 76060000035 HC ANESTHESIA 2 TO 2.5 HR: Performed by: ORTHOPAEDIC SURGERY

## 2021-06-07 PROCEDURE — 77030039266 HC ADH SKN EXOFIN S2SG -A: Performed by: ORTHOPAEDIC SURGERY

## 2021-06-07 PROCEDURE — 74011000636 HC RX REV CODE- 636: Performed by: ORTHOPAEDIC SURGERY

## 2021-06-07 PROCEDURE — 65270000029 HC RM PRIVATE

## 2021-06-07 PROCEDURE — 77030012012 HC AIRWY OP SFT TELE -A: Performed by: ANESTHESIOLOGY

## 2021-06-07 PROCEDURE — L0120 CERV FLEX N/ADJ FOAM PRE OTS: HCPCS | Performed by: ORTHOPAEDIC SURGERY

## 2021-06-07 PROCEDURE — 77030012890: Performed by: ORTHOPAEDIC SURGERY

## 2021-06-07 PROCEDURE — 77030003029 HC SUT VCRL J&J -B: Performed by: ORTHOPAEDIC SURGERY

## 2021-06-07 PROCEDURE — 74011250637 HC RX REV CODE- 250/637: Performed by: NURSE ANESTHETIST, CERTIFIED REGISTERED

## 2021-06-07 PROCEDURE — 2709999900 HC NON-CHARGEABLE SUPPLY: Performed by: ORTHOPAEDIC SURGERY

## 2021-06-07 PROCEDURE — 77030011265 HC ELECTRD BLD HEX COVD -A: Performed by: ORTHOPAEDIC SURGERY

## 2021-06-07 PROCEDURE — 00600 ANES PX CRV SPINE&CORD NOS: CPT | Performed by: NURSE ANESTHETIST, CERTIFIED REGISTERED

## 2021-06-07 PROCEDURE — 77030019908 HC STETH ESOPH SIMS -A: Performed by: ANESTHESIOLOGY

## 2021-06-07 PROCEDURE — 74011250637 HC RX REV CODE- 250/637: Performed by: ORTHOPAEDIC SURGERY

## 2021-06-07 PROCEDURE — 80048 BASIC METABOLIC PNL TOTAL CA: CPT

## 2021-06-07 PROCEDURE — 77030040922 HC BLNKT HYPOTHRM STRY -A: Performed by: ORTHOPAEDIC SURGERY

## 2021-06-07 PROCEDURE — 77030030163 HC BN WAX J&J -A: Performed by: ORTHOPAEDIC SURGERY

## 2021-06-07 PROCEDURE — 77030008683 HC TU ET CUF COVD -A: Performed by: ANESTHESIOLOGY

## 2021-06-07 PROCEDURE — 74011000250 HC RX REV CODE- 250: Performed by: NURSE ANESTHETIST, CERTIFIED REGISTERED

## 2021-06-07 PROCEDURE — 77030026438 HC STYL ET INTUB CARD -A: Performed by: ANESTHESIOLOGY

## 2021-06-07 PROCEDURE — 77030013079 HC BLNKT BAIR HGGR 3M -A: Performed by: ANESTHESIOLOGY

## 2021-06-07 PROCEDURE — 77030010512 HC APPL CLP LIG J&J -C: Performed by: ORTHOPAEDIC SURGERY

## 2021-06-07 PROCEDURE — 77030011267 HC ELECTRD BLD COVD -A: Performed by: ORTHOPAEDIC SURGERY

## 2021-06-07 PROCEDURE — 77030027960 HC SPCR CERV VIKOS K2M -G1: Performed by: ORTHOPAEDIC SURGERY

## 2021-06-07 PROCEDURE — 74011250636 HC RX REV CODE- 250/636: Performed by: ORTHOPAEDIC SURGERY

## 2021-06-07 PROCEDURE — 81025 URINE PREGNANCY TEST: CPT

## 2021-06-07 PROCEDURE — 74011250636 HC RX REV CODE- 250/636: Performed by: NURSE ANESTHETIST, CERTIFIED REGISTERED

## 2021-06-07 PROCEDURE — 77030012406 HC DRN WND PENRS BARD -A: Performed by: ORTHOPAEDIC SURGERY

## 2021-06-07 PROCEDURE — 74011250636 HC RX REV CODE- 250/636

## 2021-06-07 PROCEDURE — 76210000006 HC OR PH I REC 0.5 TO 1 HR: Performed by: ORTHOPAEDIC SURGERY

## 2021-06-07 PROCEDURE — 74011000258 HC RX REV CODE- 258: Performed by: NURSE ANESTHETIST, CERTIFIED REGISTERED

## 2021-06-07 PROCEDURE — 97530 THERAPEUTIC ACTIVITIES: CPT

## 2021-06-07 PROCEDURE — 77030002933 HC SUT MCRYL J&J -A: Performed by: ORTHOPAEDIC SURGERY

## 2021-06-07 PROCEDURE — 77030031139 HC SUT VCRL2 J&J -A: Performed by: ORTHOPAEDIC SURGERY

## 2021-06-07 PROCEDURE — 74011000272 HC RX REV CODE- 272: Performed by: ORTHOPAEDIC SURGERY

## 2021-06-07 PROCEDURE — 77030013567 HC DRN WND RESERV BARD -A: Performed by: ORTHOPAEDIC SURGERY

## 2021-06-07 PROCEDURE — 77030040361 HC SLV COMPR DVT MDII -B: Performed by: ORTHOPAEDIC SURGERY

## 2021-06-07 PROCEDURE — 77030004402 HC BUR NEUR STRY -C: Performed by: ORTHOPAEDIC SURGERY

## 2021-06-07 DEVICE — GRAFT SPNL W14XH7XL11MM CERV LORD W PLUG VIKOS: Type: IMPLANTABLE DEVICE | Site: SPINE CERVICAL | Status: FUNCTIONAL

## 2021-06-07 DEVICE — PLATE ANT CERV CONSTRND 2 LVL 38MM OZARK VIEW: Type: IMPLANTABLE DEVICE | Site: SPINE CERVICAL | Status: FUNCTIONAL

## 2021-06-07 DEVICE — SCREW SPNL L14MM DIA4MM SELF STARTING VAR ANT CERV TI OZARK: Type: IMPLANTABLE DEVICE | Site: SPINE CERVICAL | Status: FUNCTIONAL

## 2021-06-07 DEVICE — DISC ARTIFICIAL W13XH5XL15MM CERV CO CHROM MOLYBDENUM ALLY: Type: IMPLANTABLE DEVICE | Site: SPINE CERVICAL | Status: FUNCTIONAL

## 2021-06-07 RX ORDER — DIAZEPAM 5 MG/1
5 TABLET ORAL
Status: DISCONTINUED | OUTPATIENT
Start: 2021-06-07 | End: 2021-06-08 | Stop reason: HOSPADM

## 2021-06-07 RX ORDER — SODIUM CHLORIDE, SODIUM LACTATE, POTASSIUM CHLORIDE, CALCIUM CHLORIDE 600; 310; 30; 20 MG/100ML; MG/100ML; MG/100ML; MG/100ML
50 INJECTION, SOLUTION INTRAVENOUS CONTINUOUS
Status: DISCONTINUED | OUTPATIENT
Start: 2021-06-07 | End: 2021-06-07 | Stop reason: HOSPADM

## 2021-06-07 RX ORDER — SODIUM CHLORIDE, SODIUM LACTATE, POTASSIUM CHLORIDE, CALCIUM CHLORIDE 600; 310; 30; 20 MG/100ML; MG/100ML; MG/100ML; MG/100ML
100 INJECTION, SOLUTION INTRAVENOUS CONTINUOUS
Status: DISCONTINUED | OUTPATIENT
Start: 2021-06-07 | End: 2021-06-07 | Stop reason: HOSPADM

## 2021-06-07 RX ORDER — SODIUM CHLORIDE 0.9 % (FLUSH) 0.9 %
5-40 SYRINGE (ML) INJECTION AS NEEDED
Status: DISCONTINUED | OUTPATIENT
Start: 2021-06-07 | End: 2021-06-08 | Stop reason: HOSPADM

## 2021-06-07 RX ORDER — HYDROMORPHONE HYDROCHLORIDE 2 MG/ML
INJECTION, SOLUTION INTRAMUSCULAR; INTRAVENOUS; SUBCUTANEOUS AS NEEDED
Status: DISCONTINUED | OUTPATIENT
Start: 2021-06-07 | End: 2021-06-07 | Stop reason: HOSPADM

## 2021-06-07 RX ORDER — HYDROMORPHONE HYDROCHLORIDE 2 MG/ML
INJECTION, SOLUTION INTRAMUSCULAR; INTRAVENOUS; SUBCUTANEOUS
Status: COMPLETED
Start: 2021-06-07 | End: 2021-06-07

## 2021-06-07 RX ORDER — SUCCINYLCHOLINE CHLORIDE 20 MG/ML
INJECTION INTRAMUSCULAR; INTRAVENOUS AS NEEDED
Status: DISCONTINUED | OUTPATIENT
Start: 2021-06-07 | End: 2021-06-07 | Stop reason: HOSPADM

## 2021-06-07 RX ORDER — OXYCODONE HYDROCHLORIDE 5 MG/1
5-10 TABLET ORAL
Status: DISCONTINUED | OUTPATIENT
Start: 2021-06-07 | End: 2021-06-08 | Stop reason: HOSPADM

## 2021-06-07 RX ORDER — NEOSTIGMINE METHYLSULFATE 1 MG/ML
INJECTION, SOLUTION INTRAVENOUS AS NEEDED
Status: DISCONTINUED | OUTPATIENT
Start: 2021-06-07 | End: 2021-06-07 | Stop reason: HOSPADM

## 2021-06-07 RX ORDER — ONDANSETRON 2 MG/ML
4 INJECTION INTRAMUSCULAR; INTRAVENOUS
Status: DISCONTINUED | OUTPATIENT
Start: 2021-06-07 | End: 2021-06-08 | Stop reason: HOSPADM

## 2021-06-07 RX ORDER — HYDROMORPHONE HYDROCHLORIDE 1 MG/ML
1 INJECTION, SOLUTION INTRAMUSCULAR; INTRAVENOUS; SUBCUTANEOUS
Status: DISCONTINUED | OUTPATIENT
Start: 2021-06-07 | End: 2021-06-08 | Stop reason: HOSPADM

## 2021-06-07 RX ORDER — NALOXONE HYDROCHLORIDE 0.4 MG/ML
0.4 INJECTION, SOLUTION INTRAMUSCULAR; INTRAVENOUS; SUBCUTANEOUS AS NEEDED
Status: DISCONTINUED | OUTPATIENT
Start: 2021-06-07 | End: 2021-06-08 | Stop reason: HOSPADM

## 2021-06-07 RX ORDER — LIDOCAINE HYDROCHLORIDE 20 MG/ML
INJECTION, SOLUTION EPIDURAL; INFILTRATION; INTRACAUDAL; PERINEURAL AS NEEDED
Status: DISCONTINUED | OUTPATIENT
Start: 2021-06-07 | End: 2021-06-07 | Stop reason: HOSPADM

## 2021-06-07 RX ORDER — DIPHENHYDRAMINE HYDROCHLORIDE 50 MG/ML
12.5 INJECTION, SOLUTION INTRAMUSCULAR; INTRAVENOUS
Status: DISCONTINUED | OUTPATIENT
Start: 2021-06-07 | End: 2021-06-07 | Stop reason: HOSPADM

## 2021-06-07 RX ORDER — HYDROMORPHONE HYDROCHLORIDE 1 MG/ML
0.5 INJECTION, SOLUTION INTRAMUSCULAR; INTRAVENOUS; SUBCUTANEOUS
Status: DISCONTINUED | OUTPATIENT
Start: 2021-06-07 | End: 2021-06-07 | Stop reason: HOSPADM

## 2021-06-07 RX ORDER — PROPOFOL 10 MG/ML
INJECTION, EMULSION INTRAVENOUS AS NEEDED
Status: DISCONTINUED | OUTPATIENT
Start: 2021-06-07 | End: 2021-06-07 | Stop reason: HOSPADM

## 2021-06-07 RX ORDER — DIPHENHYDRAMINE HYDROCHLORIDE 50 MG/ML
12.5 INJECTION, SOLUTION INTRAMUSCULAR; INTRAVENOUS
Status: DISCONTINUED | OUTPATIENT
Start: 2021-06-07 | End: 2021-06-08 | Stop reason: HOSPADM

## 2021-06-07 RX ORDER — ONDANSETRON 2 MG/ML
4 INJECTION INTRAMUSCULAR; INTRAVENOUS ONCE
Status: DISCONTINUED | OUTPATIENT
Start: 2021-06-07 | End: 2021-06-07 | Stop reason: HOSPADM

## 2021-06-07 RX ORDER — SODIUM CHLORIDE 0.9 % (FLUSH) 0.9 %
5-40 SYRINGE (ML) INJECTION EVERY 8 HOURS
Status: DISCONTINUED | OUTPATIENT
Start: 2021-06-07 | End: 2021-06-08 | Stop reason: HOSPADM

## 2021-06-07 RX ORDER — ONDANSETRON 2 MG/ML
INJECTION INTRAMUSCULAR; INTRAVENOUS AS NEEDED
Status: DISCONTINUED | OUTPATIENT
Start: 2021-06-07 | End: 2021-06-07 | Stop reason: HOSPADM

## 2021-06-07 RX ORDER — DOCUSATE SODIUM 100 MG/1
100 CAPSULE, LIQUID FILLED ORAL 2 TIMES DAILY
Status: DISCONTINUED | OUTPATIENT
Start: 2021-06-07 | End: 2021-06-08 | Stop reason: HOSPADM

## 2021-06-07 RX ORDER — FAMOTIDINE 20 MG/1
20 TABLET, FILM COATED ORAL ONCE
Status: COMPLETED | OUTPATIENT
Start: 2021-06-07 | End: 2021-06-07

## 2021-06-07 RX ORDER — CELECOXIB 400 MG/1
400 CAPSULE ORAL ONCE
Status: COMPLETED | OUTPATIENT
Start: 2021-06-07 | End: 2021-06-07

## 2021-06-07 RX ORDER — MIDAZOLAM HYDROCHLORIDE 1 MG/ML
INJECTION, SOLUTION INTRAMUSCULAR; INTRAVENOUS AS NEEDED
Status: DISCONTINUED | OUTPATIENT
Start: 2021-06-07 | End: 2021-06-07 | Stop reason: HOSPADM

## 2021-06-07 RX ORDER — HYDROMORPHONE HYDROCHLORIDE 1 MG/ML
0.2 INJECTION, SOLUTION INTRAMUSCULAR; INTRAVENOUS; SUBCUTANEOUS AS NEEDED
Status: DISCONTINUED | OUTPATIENT
Start: 2021-06-07 | End: 2021-06-07 | Stop reason: HOSPADM

## 2021-06-07 RX ORDER — DEXAMETHASONE SODIUM PHOSPHATE 4 MG/ML
INJECTION, SOLUTION INTRA-ARTICULAR; INTRALESIONAL; INTRAMUSCULAR; INTRAVENOUS; SOFT TISSUE AS NEEDED
Status: DISCONTINUED | OUTPATIENT
Start: 2021-06-07 | End: 2021-06-07 | Stop reason: HOSPADM

## 2021-06-07 RX ORDER — PREGABALIN 75 MG/1
75 CAPSULE ORAL ONCE
Status: COMPLETED | OUTPATIENT
Start: 2021-06-07 | End: 2021-06-07

## 2021-06-07 RX ORDER — LORAZEPAM 2 MG/ML
1 INJECTION INTRAMUSCULAR
Status: DISCONTINUED | OUTPATIENT
Start: 2021-06-07 | End: 2021-06-08 | Stop reason: HOSPADM

## 2021-06-07 RX ORDER — SODIUM CHLORIDE 0.9 % (FLUSH) 0.9 %
5-40 SYRINGE (ML) INJECTION AS NEEDED
Status: DISCONTINUED | OUTPATIENT
Start: 2021-06-07 | End: 2021-06-07 | Stop reason: HOSPADM

## 2021-06-07 RX ORDER — DIPHENHYDRAMINE HCL 25 MG
25 CAPSULE ORAL
Status: DISCONTINUED | OUTPATIENT
Start: 2021-06-07 | End: 2021-06-08 | Stop reason: HOSPADM

## 2021-06-07 RX ORDER — ACETAMINOPHEN 500 MG
1000 TABLET ORAL EVERY 6 HOURS
Status: DISCONTINUED | OUTPATIENT
Start: 2021-06-07 | End: 2021-06-08 | Stop reason: HOSPADM

## 2021-06-07 RX ORDER — LIDOCAINE HYDROCHLORIDE 10 MG/ML
0.1 INJECTION, SOLUTION EPIDURAL; INFILTRATION; INTRACAUDAL; PERINEURAL AS NEEDED
Status: DISCONTINUED | OUTPATIENT
Start: 2021-06-07 | End: 2021-06-07 | Stop reason: HOSPADM

## 2021-06-07 RX ORDER — ROCURONIUM BROMIDE 10 MG/ML
INJECTION, SOLUTION INTRAVENOUS AS NEEDED
Status: DISCONTINUED | OUTPATIENT
Start: 2021-06-07 | End: 2021-06-07 | Stop reason: HOSPADM

## 2021-06-07 RX ORDER — FAMOTIDINE 20 MG/1
40 TABLET, FILM COATED ORAL EVERY 12 HOURS
Status: DISCONTINUED | OUTPATIENT
Start: 2021-06-07 | End: 2021-06-08 | Stop reason: HOSPADM

## 2021-06-07 RX ORDER — SODIUM CHLORIDE 0.9 % (FLUSH) 0.9 %
5-40 SYRINGE (ML) INJECTION EVERY 8 HOURS
Status: DISCONTINUED | OUTPATIENT
Start: 2021-06-07 | End: 2021-06-07 | Stop reason: HOSPADM

## 2021-06-07 RX ORDER — FENTANYL CITRATE 50 UG/ML
INJECTION, SOLUTION INTRAMUSCULAR; INTRAVENOUS AS NEEDED
Status: DISCONTINUED | OUTPATIENT
Start: 2021-06-07 | End: 2021-06-07 | Stop reason: HOSPADM

## 2021-06-07 RX ORDER — OXYCODONE AND ACETAMINOPHEN 5; 325 MG/1; MG/1
1 TABLET ORAL AS NEEDED
Status: DISCONTINUED | OUTPATIENT
Start: 2021-06-07 | End: 2021-06-07 | Stop reason: HOSPADM

## 2021-06-07 RX ORDER — HYDROCHLOROTHIAZIDE 25 MG/1
25 TABLET ORAL DAILY
Status: DISCONTINUED | OUTPATIENT
Start: 2021-06-08 | End: 2021-06-08 | Stop reason: HOSPADM

## 2021-06-07 RX ORDER — VANCOMYCIN HYDROCHLORIDE 1 G/20ML
INJECTION, POWDER, LYOPHILIZED, FOR SOLUTION INTRAVENOUS AS NEEDED
Status: DISCONTINUED | OUTPATIENT
Start: 2021-06-07 | End: 2021-06-07 | Stop reason: HOSPADM

## 2021-06-07 RX ORDER — GLYCOPYRROLATE 0.2 MG/ML
INJECTION INTRAMUSCULAR; INTRAVENOUS AS NEEDED
Status: DISCONTINUED | OUTPATIENT
Start: 2021-06-07 | End: 2021-06-07 | Stop reason: HOSPADM

## 2021-06-07 RX ORDER — DEXTROSE, SODIUM CHLORIDE, AND POTASSIUM CHLORIDE 5; .45; .15 G/100ML; G/100ML; G/100ML
25 INJECTION INTRAVENOUS CONTINUOUS
Status: DISCONTINUED | OUTPATIENT
Start: 2021-06-07 | End: 2021-06-08 | Stop reason: HOSPADM

## 2021-06-07 RX ADMIN — GLYCOPYRROLATE 0.4 MG: 0.2 INJECTION INTRAMUSCULAR; INTRAVENOUS at 12:19

## 2021-06-07 RX ADMIN — ONDANSETRON 4 MG: 2 INJECTION INTRAMUSCULAR; INTRAVENOUS at 12:19

## 2021-06-07 RX ADMIN — DOCUSATE SODIUM 100 MG: 100 CAPSULE, LIQUID FILLED ORAL at 17:47

## 2021-06-07 RX ADMIN — Medication 3 MG: at 12:19

## 2021-06-07 RX ADMIN — ACETAMINOPHEN 1000 MG: 500 TABLET, FILM COATED ORAL at 23:25

## 2021-06-07 RX ADMIN — DEXMEDETOMIDINE HYDROCHLORIDE 8 MCG: 100 INJECTION, SOLUTION, CONCENTRATE INTRAVENOUS at 10:59

## 2021-06-07 RX ADMIN — DEXMEDETOMIDINE HYDROCHLORIDE 6 MCG: 100 INJECTION, SOLUTION, CONCENTRATE INTRAVENOUS at 10:57

## 2021-06-07 RX ADMIN — Medication 10 ML: at 20:46

## 2021-06-07 RX ADMIN — WATER 2 G: 1 INJECTION INTRAMUSCULAR; INTRAVENOUS; SUBCUTANEOUS at 10:47

## 2021-06-07 RX ADMIN — HYDROMORPHONE HYDROCHLORIDE 0.5 MG: 2 INJECTION, SOLUTION INTRAMUSCULAR; INTRAVENOUS; SUBCUTANEOUS at 13:17

## 2021-06-07 RX ADMIN — ACETAMINOPHEN 1000 MG: 500 TABLET, FILM COATED ORAL at 17:47

## 2021-06-07 RX ADMIN — PREGABALIN 75 MG: 75 CAPSULE ORAL at 09:34

## 2021-06-07 RX ADMIN — DEXAMETHASONE SODIUM PHOSPHATE 10 MG: 4 INJECTION, SOLUTION INTRAMUSCULAR; INTRAVENOUS at 10:42

## 2021-06-07 RX ADMIN — FAMOTIDINE 40 MG: 20 TABLET ORAL at 20:46

## 2021-06-07 RX ADMIN — SODIUM CHLORIDE, SODIUM LACTATE, POTASSIUM CHLORIDE, AND CALCIUM CHLORIDE 50 ML/HR: 600; 310; 30; 20 INJECTION, SOLUTION INTRAVENOUS at 09:34

## 2021-06-07 RX ADMIN — DEXMEDETOMIDINE HYDROCHLORIDE 6 MCG: 100 INJECTION, SOLUTION, CONCENTRATE INTRAVENOUS at 10:36

## 2021-06-07 RX ADMIN — FENTANYL CITRATE 100 MCG: 50 INJECTION, SOLUTION INTRAMUSCULAR; INTRAVENOUS at 10:36

## 2021-06-07 RX ADMIN — CELECOXIB 400 MG: 400 CAPSULE ORAL at 09:34

## 2021-06-07 RX ADMIN — POTASSIUM CHLORIDE, DEXTROSE MONOHYDRATE AND SODIUM CHLORIDE 25 ML/HR: 150; 5; 450 INJECTION, SOLUTION INTRAVENOUS at 14:39

## 2021-06-07 RX ADMIN — HYDROMORPHONE HYDROCHLORIDE 0.2 MG: 2 INJECTION, SOLUTION INTRAMUSCULAR; INTRAVENOUS; SUBCUTANEOUS at 12:41

## 2021-06-07 RX ADMIN — OXYCODONE HYDROCHLORIDE 5 MG: 5 TABLET ORAL at 20:46

## 2021-06-07 RX ADMIN — HYDROMORPHONE HYDROCHLORIDE 0.2 MG: 2 INJECTION, SOLUTION INTRAMUSCULAR; INTRAVENOUS; SUBCUTANEOUS at 13:32

## 2021-06-07 RX ADMIN — FAMOTIDINE 40 MG: 20 TABLET ORAL at 14:39

## 2021-06-07 RX ADMIN — FAMOTIDINE 20 MG: 20 TABLET ORAL at 09:34

## 2021-06-07 RX ADMIN — CEFAZOLIN 2 G: 1 INJECTION, POWDER, FOR SOLUTION INTRAMUSCULAR; INTRAVENOUS at 17:47

## 2021-06-07 RX ADMIN — LIDOCAINE HYDROCHLORIDE 100 MG: 20 INJECTION, SOLUTION EPIDURAL; INFILTRATION; INTRACAUDAL; PERINEURAL at 10:36

## 2021-06-07 RX ADMIN — MIDAZOLAM HYDROCHLORIDE 2 MG: 2 INJECTION, SOLUTION INTRAMUSCULAR; INTRAVENOUS at 10:30

## 2021-06-07 RX ADMIN — PROPOFOL 200 MG: 10 INJECTION, EMULSION INTRAVENOUS at 10:37

## 2021-06-07 RX ADMIN — SUCCINYLCHOLINE CHLORIDE 100 MG: 20 INJECTION, SOLUTION INTRAMUSCULAR; INTRAVENOUS at 10:37

## 2021-06-07 RX ADMIN — ACETAMINOPHEN 1000 MG: 500 TABLET, FILM COATED ORAL at 14:39

## 2021-06-07 RX ADMIN — OXYCODONE HYDROCHLORIDE 5 MG: 5 TABLET ORAL at 14:39

## 2021-06-07 RX ADMIN — ROCURONIUM BROMIDE 35 MG: 50 INJECTION INTRAVENOUS at 10:45

## 2021-06-07 RX ADMIN — HYDROMORPHONE HYDROCHLORIDE 1 MG: 1 INJECTION, SOLUTION INTRAMUSCULAR; INTRAVENOUS; SUBCUTANEOUS at 17:46

## 2021-06-07 NOTE — PROGRESS NOTES
Problem: Mobility Impaired (Adult and Pediatric)  Goal: *Acute Goals and Plan of Care (Insert Text)  Description: Physical Therapy Goals  Initiated 6/7/2021 and to be accomplished within 7 day(s)  1. Patient will move from supine to sit and sit to supine  in bed with modified independence. 2.  Patient will transfer from bed to chair and chair to bed with modified independence using the least restrictive device. 3.  Patient will perform sit to stand with modified independence. 4.  Patient will ambulate with modified independence for 200 feet with the least restrictive device. 5.  Patient will ascend/descend 3 stairs with handrail(s) with modified independence. PLOF: Patient was independent with self care and functional mobility. She lives with her spouse in single story home with 3 [address]. Outcome: Progressing Towards Goal    PHYSICAL THERAPY EVALUATION    Patient: Julio Dykes [de-identified]49 y.o. female)  Date: 6/7/2021  Primary Diagnosis: Cervical spondylosis with myelopathy [M47.12]  Procedure(s) (LRB):  ANTERIOR CERVICAL DISCECTOMY WITH FUSION C4/5 C5/6; C-ARM/K2M (N/A)  CERVICAL DISC ARTHROPLASTY C3/4; MOBI-C (N/A) Day of Surgery   Precautions: cervical         ASSESSMENT :  Patient is a 50 yo female admitted to hospital for ACDF and presents today POD#0 and agreeable to therapy. Patient was educated on cervical precautions and demonstrated good compliance with precautions throughout session. Patient was given demo with instruction on sit <> stand transfer and gait training and transferred to standing with SBA and ambulated with CGA for safety due to dizziness. At conclusion of session patient transferred to supine in bed and was left resting with call bell by the side and SCDs donned. Patient instructed to call for assistance if they needed to get up for any reason and denied need for further assistance. Patient will benefit from skilled intervention to address the above impairments.   Patient's rehabilitation potential is considered to be Good  Factors which may influence rehabilitation potential include:   [x]         None noted  []         Mental ability/status  []         Medical condition  []         Home/family situation and support systems  []         Safety awareness  []         Pain tolerance/management  []         Other:      PLAN :  Recommendations and Planned Interventions:   []           Bed Mobility Training             []    Neuromuscular Re-Education  [x]           Transfer Training                   []    Orthotic/Prosthetic Training  [x]           Gait Training                          []    Modalities  [x]           Therapeutic Exercises           []    Edema Management/Control  [x]           Therapeutic Activities            []    Family Training/Education  [x]           Patient Education  []           Other (comment):    Frequency/Duration: Patient will be followed by physical therapy 1-2 times per day/4-7 days per week to address goals. Discharge Recommendations: None  Further Equipment Recommendations for Discharge: n/a     SUBJECTIVE:   Patient stated I tried therapy but it didn't work.     OBJECTIVE DATA SUMMARY:     Past Medical History:   Diagnosis Date    Facet arthropathy     Heart murmur 2006    High cholesterol     Hypertension     Iritis     Low back pain     Nausea     Spinal stenosis     Thyroid disease     Goiter      Past Surgical History:   Procedure Laterality Date    HX ORTHOPAEDIC  2009    back surgery     Barriers to Learning/Limitations: None  Compensate with: N/A  Home Situation:  Home Situation  Home Environment: Private residence  # Steps to Enter: 3  One/Two Story Residence: One story  Living Alone: No  Support Systems: Spouse/Significant Other/Partner  Patient Expects to be Discharged toF Cor[de-identified]ration  Current DME Used/Available at Home: None  Critical Behavior:  Neurologic State: Alert  Orientation Level: Oriented X4  Cognition: Follows commands Psychosocial  Patient Behaviors: Calm; Cooperative  Purposeful Interaction: Yes  Pt Identified Daily Priority: Clinical issues (comment)  Caritas Process: Teaching/learning;Establish trust  Caring Interventions: Reassure  Skin Condition/Temp: Warm     Skin Integrity: Incision (comment)  Skin Integumentary  Skin Color: Appropriate for ethnicity  Skin Condition/Temp: Warm  Skin Integrity: Incision (comment)  Turgor: Non-tenting     Strength:    Strength: Within functional limits        Tone & Sensation:   Tone: Normal     Sensation: Intact    Range Of Motion:  AROM: Within functional limits          Functional Mobility:  Bed Mobility:     Supine to Sit: Modified independent  Sit to Supine: Modified independent     Transfers:  Sit to Stand: Stand-by assistance  Stand to Sit: Stand-by assistance            Balance:   Sitting: Intact  Standing: Impaired; Without support  Standing - Static: Good  Standing - Dynamic : Fair    Ambulation/Gait Training:  Distance (ft): 50 Feet (ft)  Ambulation - Level of Assistance: Contact guard assistance  Gait Abnormalities: Decreased step clearance  Speed/Sahra: Slow       Pain:  Pain level pre-treatment: 8/10 neck   Pain level post-treatment: 8/10   Pain Intervention(s) : Medication (see MAR); Rest, Ice, Repositioning  Response to intervention: Nurse notified, See doc flow    Activity Tolerance:   Fair  Please refer to the flowsheet for vital signs taken during this treatment. After treatment:   []         Patient left in no apparent distress sitting up in chair  [x]         Patient left in no apparent distress in bed  [x]         Call bell left within reach  [x]         Nursing notified  []         Caregiver present  []         Bed alarm activated  []         SCDs applied    COMMUNICATION/EDUCATION:   [x]         Role of Physical Therapy in the acute care setting. [x]         Fall prevention education was provided and the patient/caregiver indicated understanding.   [x] Patient/family have participated as able in goal setting and plan of care. []         Patient/family agree to work toward stated goals and plan of care. []         Patient understands intent and goals of therapy, but is neutral about his/her participation. []         Patient is unable to participate in goal setting/plan of care: ongoing with therapy staff.  []         Other:     Thank you for this referral.  Dinh Davidson, PT   Time Calculation: 23 mins      Eval Complexity: History: LOW Complexity : Zero comorbidities / personal factors that will impact the outcome / POCExam:LOW Complexity : 1-2 Standardized tests and measures addressing body structure, function, activity limitation and / or participation in recreation  Presentation: LOW Complexity : Stable, uncomplicated  Clinical Decision Making:Low Complexity    Overall Complexity:LOW

## 2021-06-07 NOTE — PROGRESS NOTES
End of Shift Note     Bedside and verbal shift change report given to Alistair Baker (On coming nurse) by Baylee Aponte RN (Off going nurse).   Report included the following information:      --Procedure Summary     --MAR,     --Recent Results     --Med Rec Status

## 2021-06-07 NOTE — INTERVAL H&P NOTE
Update History & Physical 
 
The Patient's History and Physical of June 7, 2021 was reviewed with the patient and I examined the patient. There was no change. The surgical site was confirmed by the patient and me. Plan:  The risk, benefits, expected outcome, and alternative to the recommended procedure have been discussed with the patient. Patient understands and wants to proceed with the procedure.  
 
Electronically signed by Lyn Almanza MD on 6/7/2021 at 10:27 AM

## 2021-06-07 NOTE — H&P
Pre-Admission History and Physical    Patient: Moises Akins   MRN: [id number]   SSN: [id number]   YOB: 1965   Age: 49 y.o. Sex: female     Patient scheduled for: ACDF C3/4, C4/5, and C5/6 and cervical disc arthroplasty  . Date of surgery: 6/7/21. Location of surgery: [facility]. Surgeon: Manuel Bloom MD    HPI:  Moises Akins is a 49 y.o. female with significant neck and arm pain and weakness. MRI demonstrates very severe stenosis at C5/6 with a degenerative collapsed segment. There is also a progression of stenosis at the interval segment at C4/5. The endplates are abnormal on this level. C3/4 has worsened as well and is stenotic. This patient has failed the presurgical conservative treatments  including physical therapy, spinal block injections and medications. She is being admitted for surgical intervention.          Past Medical History:   Diagnosis Date    Facet arthropathy     Heart murmur 2006    High cholesterol     Hypertension     Iritis     Low back pain     Nausea     Spinal stenosis     Thyroid disease     Goiter      Social History     Socioeconomic History    Marital status:      Spouse name: Not on file    Number of children: Not on file    Years of education: Not on file    Highest education level: Not on file   Tobacco Use    Smoking status: Never Smoker    Smokeless tobacco: Never Used   Vaping Use    Vaping Use: Never used   Substance and Sexual Activity    Alcohol use: No    Drug use: No   Other Topics Concern    Caffeine Concern Yes     Comment: 4 oz three times a week    Exercise Yes     Comment: walks 30 minutes 3 to 4 times a week or 5,000 steps a day     Seat Belt Yes     Social Determinants of Health     Financial Resource Strain:     Difficulty of Paying Living Expenses:    Food Insecurity:     Worried About Running Out of Food in the Last Year:     Luann of Food in the Last Year:    Transportation Needs:     Lack of Transportation (Medical):  Lack of Transportation (Non-Medical):    Physical Activity:     Days of Exercise per Week:     Minutes of Exercise per Session:    Stress:     Feeling of Stress :    Social Connections:     Frequency of Communication with Friends and Family:     Frequency of Social Gatherings with Friends and Family:     Attends Sikh Services:     Active Member of Clubs or Organizations:     Attends Club or Organization Meetings:     Marital Status:      Past Surgical History:   Procedure Laterality Date    HX ORTHOPAEDIC  2009    back surgery     Family History   Problem Relation Age of Onset    Diabetes Mother     Heart Disease Father         CAD     No Known Allergies  Current Facility-Administered Medications   Medication Dose Route Frequency Provider Last Rate Last Admin    lidocaine (PF) (XYLOCAINE) 10 mg/mL (1 %) injection 0.1 mL  0.1 mL SubCUTAneous PRN Pacheco Buckner CRNA        lactated Ringers infusion  50 mL/hr IntraVENous CONTINUOUS Pacheco Buckner CRNA 50 mL/hr at 06/07/21 0934 50 mL/hr at 06/07/21 0934    ceFAZolin (ANCEF) 2 g in sterile water (preservative free) 20 mL IV syringe  2 g IntraVENous ONCE Enrique Lai MD           ROS:  Denies chills, fever,night sweats,  bowel or bladder dysfunction, unexplained weight loss/weight gain, chest pain, sob or anxiety. Physical Examination    Gen: Well developed, well nourished 49 y.o. female painful ROM of her cervical spine with radiating pain into her arms with diminished dexterity and balance. Weakness of the left deltoid and biceps. No clonus, no hoffmans, no babinski. Assessment and Plan    Due to the pt's persistent symptoms unrelieved by conservative measure Julio Dykes is being admitted to [facility] to undergo surgical intervention. The post-operative plan of care consists of physical therapy, home health and a 2 week f/u office visit.    The risks, benefits, complications and alternatives to surgery have been discussed in detail with the patient. The patient understands and agrees to proceed.

## 2021-06-07 NOTE — BRIEF OP NOTE
Brief Postoperative Note    Patient: Lucio Denton  YOB: 1965  MRN: [id number]    Date of Procedure: 6/7/2021     Pre-Op Diagnosis: Cervical spondylosis with myelopathy [M47.12]    Post-Op Diagnosis: Same as preoperative diagnosis. Procedure(s):  ANTERIOR CERVICAL DISCECTOMY WITH FUSION C4/5 C5/6; C-ARM/K2M  CERVICAL DISC ARTHROPLASTY C3/4; MOBI-C    Surgeon(s):  Martin Martinez MD    Surgical Assistant: Surg Asst-1: Rody Less    Anesthesia: General     Estimated Blood Loss (mL): Minimal    Complications: None    Specimens: * No specimens in log *     Implants:   Implant Name Type Inv.  Item Serial No.  Lot No. LRB No. Used Action   DISC ARTIFICIAL [id number] CERV CO CHROM MOLYBDENUM ALLY - [id number]  [place] [id number] CERV CO CHROM MOLYBDENUM ALLY  [employer][id number] N/A 1 Implanted   GRAFT SPNL [id number] CERV Lawernce Karyn [id number]  GRAFT SPNL [id number] Clent Laming [id number] K2[employer] INC_WD  N/A 1 Implanted   Assembled Cortico-Cancellous   [id number] K2M INC  N/A 1 Implanted       Drains: * No LDAs found *    Findings: stenosis, hnp, ocssified ligament 5/6    Electronically Signed by Wing Alin MD on 6/7/2021 at 12:19 PM

## 2021-06-07 NOTE — OP NOTES
[address] [place] [address]   OPERATIVE REPORT    Name:  Ev Andersen  MR#:   [id number]  :  1965  ACCOUNT #:  [de-identified]  DATE OF SERVICE:  2021    PREOPERATIVE DIAGNOSIS:  Cervical spondylotic myelopathy. POSTOPERATIVE DIAGNOSIS:  Cervical spondylotic myelopathy. PROCEDURES PERFORMED:  C3-4 anterior cervical diskectomy and cervical disk arthroplasty with Mobi-C device; anterior cervical decompression and fusion C4-5, C5-6; structural allograft x2; anterior cervical plate fixation C4, C5, C6 with Jamal anterior cervical plate and screws. SURGEON:  Jackeline Barr MD    ASSISTANT:  SENG Mcfarlane    ANESTHESIA:  gea    COMPLICATIONS:  None. SPECIMENS REMOVED:  None. IMPLANTS:  Mobi-C artificial disk device C3-4. Anterior cervical plate and screws, [place] type, C4, C5, C6.    ESTIMATED BLOOD LOSS:  Minimal.    FINDINGS:  The patient had a softer disk herniation at C3-4. Disk interval was small. Small disk arthroplasty device was required. The patient had irregular abnormal endplates at both P4-9 and C5-6, so a fusion was chosen. Stenosis was severest at C5-6 where the posterior ligament was partially ossified. DESCRIPTION OF PROCEDURE:  Following induction of tracheal anesthesia, the patient was placed with the head in neutral position on a Kendrick headrest.  The patient was prepped and draped in the usual fashion. C-arm image verified midline positioning and also verified rotation. Right-sided approach was utilized. Sternocleidomastoid and great vessels were mobilized laterally, the esophagus and trachea mobilized medially with blunt finger dissection. The patient appeared to have a goiter making the dissection and retraction a little bit more difficult. Prevertebral fascia was entered and C-arm image verified our surgical level. [place] pins were placed parallel to the disk space at C3 and C4.   Debbieward self-retaining retractor blade was placed under the cover of longus colli musculature bilaterally. An annulotomy was done. Radical diskectomy was done back to the posterior margin. No bur was used. Care was taken to maintain bone endplate integrity. Slow meticulous dissection. I debrided the disk space, resected the annulus circumferentially, mobilized the disk space of posterior ligamentous and disk materials until the dura was verified. A thorough decompression was accomplished. A 15 x 13 x 5 implant, the smallest that they have, was utilized. It was tamped firmly into place in perfect neutral rotation midline. C-arm image verified our positioning. It was tight. [place] pins re-seated and attention was then turned to the C4, C5, C6 region. Beginning at C4-5 and repeated at C5-6. [place] pins placed in the segments above and below. Cloward self-retaining retractor blade was placed under the cover of longus colli musculature bilaterally. Annular tissue was incised. A radical diskectomy was done back to the posterior margin. High-speed bur was used to bur posterior marginal osteophytes and also re-fashion the endplates to try to have it seat the graft more appropriately. A thorough decompression of the epidural space was accomplished, endplates prepared. A #7 structural allograft tamped firmly into place with excellent bony apposition. At the procedure beneath it, again a radical diskectomy was done, endplates resected, uncinate spurs resected. Posterior longitudinal ligament was resected. The disk space was debrided. The cord was decompressed. Thorough decompression accomplished from uncinate to uncinate. Another structural graft tamped firmly into place with excellent bony apposition. A lordosed cervical plate was then affixed to the spine with two screws in C4, two in C5, two in C6, and the locking device engaged. The wound was copiously irrigated. There was no active bleeding. Vancomycin powder instilled for infection prophylaxis.   A deep drain was utilized. Neck was closed in layers and the skin closed with subcuticular suture and Dermabond. A sterile occlusive dressing was placed upon the wound. All counts were correct.       MD TURNER [id number]  D:  06/07/2021 12:36  T:  06/07/2021 14:39  JOB #:  [id number]

## 2021-06-07 NOTE — PERIOP NOTES
TRANSFER - OUT REPORT:    Verbal report given to Myrtle(name) on Boni Contreras  being transferred to 2 Surgical(unit) for routine post - op       Report consisted of patients Situation, Background, Assessment and   Recommendations(SBAR). Information from the following report(s) SBAR, Procedure Summary, Intake/Output and MAR was reviewed with the receiving nurse. Lines:   Peripheral IV 06/07/21 Anterior;Right Hand (Active)   Site Assessment Clean, dry, & intact 06/07/21 1251   Phlebitis Assessment 0 06/07/21 1251   Infiltration Assessment 0 06/07/21 1251   Dressing Status Clean, dry, & intact 06/07/21 1251   Dressing Type Tape;Transparent 06/07/21 1251   Hub Color/Line Status Blue;Capped 06/07/21 1251   Action Taken Dressing reinforced 06/07/21 0954   Alcohol Cap Used No 06/07/21 0953       Peripheral IV 06/07/21 Anterior; Left Hand (Active)   Site Assessment Clean, dry, & intact 06/07/21 0954   Phlebitis Assessment 0 06/07/21 0954   Dressing Status Clean, dry, & intact 06/07/21 0954   Dressing Type Tape;Transparent 06/07/21 0954   Hub Color/Line Status Flushed; Infusing;Blue 06/07/21 0954   Action Taken Dressing reinforced 06/07/21 0954   Alcohol Cap Used No 06/07/21 0954        Opportunity for questions and clarification was provided.       Patient transported with:   [employer]

## 2021-06-07 NOTE — ANESTHESIA PREPROCEDURE EVALUATION
Relevant Problems   No relevant active problems       Anesthetic History   No history of anesthetic complications            Review of Systems / Medical History  Patient summary reviewed and pertinent labs reviewed    Pulmonary  Within defined limits                 Neuro/Psych   Within defined limits           Cardiovascular    Hypertension: well controlled              Exercise tolerance: >4 METS     GI/Hepatic/Renal                Endo/Other      Hypothyroidism: well controlled  Arthritis     Other Findings              Physical Exam    Airway  Mallampati: III  TM Distance: 4 - 6 cm  Neck ROM: decreased range of motion   Mouth opening: Normal     Cardiovascular    Rhythm: regular  Rate: normal         Dental  No notable dental hx       Pulmonary  Breath sounds clear to auscultation               Abdominal  GI exam deferred       Other Findings            Anesthetic Plan    ASA: 2  Anesthesia type: general          Induction: Intravenous  Anesthetic plan and risks discussed with: Patient

## 2021-06-07 NOTE — PROGRESS NOTES
TRANSFER - IN REPORT:    Verbal report received from 2002 Nadir King RN(name) on [employer]  being received from PACU(unit) for routine post - op      Report consisted of patients Situation, Background, Assessment and   Recommendations(SBAR). Information from the following report(s) SBAR, OR Summary, Procedure Summary and MAR was reviewed with the receiving nurse. Opportunity for questions and clarification was provided. Assessment completed upon patients arrival to unit and care assumed.

## 2021-06-07 NOTE — PROGRESS NOTES
vss  afeb  Neuro intact  Mild dysphagia  Pain Primarily neck stiffness and tightness at occiput extending down cervical spine  15ccs in drain   Watch drain and d/c in AM  mobilize  D/c home in AM  2 week f/u in clinic

## 2021-06-07 NOTE — ANESTHESIA POSTPROCEDURE EVALUATION
Procedure(s):  ANTERIOR CERVICAL DISCECTOMY WITH FUSION C4/5 C5/6; C-ARM/K2M  CERVICAL DISC ARTHROPLASTY C3/4; MOBI-C.    general    Anesthesia Post Evaluation      Multimodal analgesia: multimodal analgesia used between 6 hours prior to anesthesia start to PACU discharge  Patient location during evaluation: bedside  Patient participation: complete - patient participated  Level of consciousness: awake  Pain score: 4  Pain management: adequate  Airway patency: patent  Anesthetic complications: no  Cardiovascular status: stable  Respiratory status: acceptable  Hydration status: acceptable  Post anesthesia nausea and vomiting:  controlled  Final Post Anesthesia Temperature Assessment:  Normothermia (36.0-37.5 degrees C)      INITIAL Post-op Vital signs:   Vitals Value Taken Time   /74 06/07/21 1340   Temp 37.2 °C (99 °F) 06/07/21 1251   Pulse 99 06/07/21 1343   Resp 12 06/07/21 1343   SpO2 96 % 06/07/21 1343   Vitals shown include unvalidated device data.

## 2021-06-07 NOTE — PROGRESS NOTES
Problem: Falls - Risk of  Goal: *Absence of Falls  Description: Document Loan Hidalgo Fall Risk and appropriate interventions in the flowsheet.   Outcome: Progressing Towards Goal  Note: Fall Risk Interventions:  Mobility Interventions: Utilize walker, cane, or other assistive device         Medication Interventions: Patient to call before getting OOB, Teach patient to arise slowly    Elimination Interventions: Call light in reach              Problem: Patient Education: Go to Patient Education Activity  Goal: Patient/Family Education  Outcome: Progressing Towards Goal     Problem: Simple Spine Procedure:  Day of Surgery  Goal: Off Pathway (Use only if patient is Off Pathway)  Outcome: Progressing Towards Goal  Goal: Activity/Safety  Outcome: Progressing Towards Goal  Goal: Consults, if ordered  Outcome: Progressing Towards Goal  Goal: Nutrition/Diet  Outcome: Progressing Towards Goal  Goal: Discharge Planning  Outcome: Progressing Towards Goal  Goal: Medications  Outcome: Progressing Towards Goal  Goal: Respiratory  Outcome: Progressing Towards Goal  Goal: Treatments/Interventions/Procedures  Outcome: Progressing Towards Goal  Goal: Psychosocial  Outcome: Progressing Towards Goal  Goal: *Verbalizes understanding of type and use of pain medication  Outcome: Progressing Towards Goal  Goal: *Optimal pain control at patient's stated goal  Outcome: Progressing Towards Goal  Goal: *Verbalizes/demonstrates understanding of proper body mechanics and use of stabilization device if ordered  Outcome: Progressing Towards Goal  Goal: *Activity level attained as ordered  Outcome: Progressing Towards Goal  Goal: *Active bowel sounds  Outcome: Progressing Towards Goal  Goal: *Respiratory status stable  Outcome: Progressing Towards Goal  Goal: *Adequate urinary output  Outcome: Progressing Towards Goal  Goal: *Hemodynamically stable  Outcome: Progressing Towards Goal     Problem: Simple Spine Procedure:  Post Op Day 1/Day of Discharge  Goal: Off Pathway (Use only if patient is Off Pathway)  Outcome: Progressing Towards Goal  Goal: Activity/Safety  Outcome: Progressing Towards Goal  Goal: Nutrition/Diet  Outcome: Progressing Towards Goal  Goal: Discharge Planning  Outcome: Progressing Towards Goal  Goal: Medications  Outcome: Progressing Towards Goal  Goal: Respiratory  Outcome: Progressing Towards Goal  Goal: Treatments/Interventions/Procedures  Outcome: Progressing Towards Goal  Goal: Psychosocial  Outcome: Progressing Towards Goal     Problem: Simple Spine Procedure: Discharge Outcomes  Goal: *Optimal pain control at patient's stated goal  Outcome: Progressing Towards Goal  Goal: *Demonstrates ability to place and remove stabilization device  Outcome: Progressing Towards Goal  Goal: *Progress independence mobility/activities (eg: Mobility precautions)  Outcome: Progressing Towards Goal  Goal: *Resumes normal function of bladder and bowel  Outcome: Progressing Towards Goal  Goal: *Lungs clear or at baseline  Outcome: Progressing Towards Goal  Goal: *Verbalizes name, dosage, time, side effects, and number of days to continue medications  Outcome: Progressing Towards Goal  Goal: *Modified independence with transfers, ambulation on levels with assistance devices, stair climbing, ADL's  Outcome: Progressing Towards Goal  Goal: *Describes follow-up/return visits to physicians  Outcome: Progressing Towards Goal  Goal: *Describes available resources and support systems  Outcome: Progressing Towards Goal  Goal: *Labs within defined limits  Outcome: Progressing Towards Goal  Goal: *Tolerating diet  Outcome: Progressing Towards Goal

## 2021-06-08 VITALS
RESPIRATION RATE: 16 BRPM | HEART RATE: 92 BPM | WEIGHT: 177 LBS | SYSTOLIC BLOOD PRESSURE: 136 MMHG | TEMPERATURE: 97.7 F | DIASTOLIC BLOOD PRESSURE: 79 MMHG | HEIGHT: 66 IN | BODY MASS INDEX: 28.45 KG/M2 | OXYGEN SATURATION: 99 %

## 2021-06-08 PROCEDURE — 97116 GAIT TRAINING THERAPY: CPT

## 2021-06-08 PROCEDURE — 97165 OT EVAL LOW COMPLEX 30 MIN: CPT

## 2021-06-08 PROCEDURE — 92610 EVALUATE SWALLOWING FUNCTION: CPT

## 2021-06-08 PROCEDURE — 97535 SELF CARE MNGMENT TRAINING: CPT

## 2021-06-08 PROCEDURE — 92526 ORAL FUNCTION THERAPY: CPT

## 2021-06-08 PROCEDURE — 74011250636 HC RX REV CODE- 250/636: Performed by: ORTHOPAEDIC SURGERY

## 2021-06-08 PROCEDURE — 74011000258 HC RX REV CODE- 258: Performed by: ORTHOPAEDIC SURGERY

## 2021-06-08 PROCEDURE — 74011000250 HC RX REV CODE- 250: Performed by: ORTHOPAEDIC SURGERY

## 2021-06-08 PROCEDURE — 74011250637 HC RX REV CODE- 250/637: Performed by: ORTHOPAEDIC SURGERY

## 2021-06-08 RX ORDER — OXYCODONE HYDROCHLORIDE 5 MG/1
5 TABLET ORAL
Qty: 28 TABLET | Refills: 0 | Status: SHIPPED | OUTPATIENT
Start: 2021-06-08 | End: 2021-06-15

## 2021-06-08 RX ADMIN — CEFAZOLIN 2 G: 1 INJECTION, POWDER, FOR SOLUTION INTRAMUSCULAR; INTRAVENOUS at 02:48

## 2021-06-08 RX ADMIN — Medication 10 ML: at 06:35

## 2021-06-08 RX ADMIN — OXYCODONE HYDROCHLORIDE 5 MG: 5 TABLET ORAL at 02:48

## 2021-06-08 RX ADMIN — DEXAMETHASONE SODIUM PHOSPHATE 10 MG: 4 INJECTION INTRA-ARTICULAR; INTRALESIONAL; INTRAMUSCULAR; INTRAVENOUS; SOFT TISSUE at 09:38

## 2021-06-08 RX ADMIN — DOCUSATE SODIUM 100 MG: 100 CAPSULE, LIQUID FILLED ORAL at 09:22

## 2021-06-08 RX ADMIN — CEFAZOLIN 2 G: 1 INJECTION, POWDER, FOR SOLUTION INTRAMUSCULAR; INTRAVENOUS at 09:22

## 2021-06-08 RX ADMIN — ACETAMINOPHEN 1000 MG: 500 TABLET, FILM COATED ORAL at 06:35

## 2021-06-08 RX ADMIN — FAMOTIDINE 40 MG: 20 TABLET ORAL at 09:22

## 2021-06-08 RX ADMIN — OXYCODONE HYDROCHLORIDE 5 MG: 5 TABLET ORAL at 10:55

## 2021-06-08 RX ADMIN — HYDROCHLOROTHIAZIDE 25 MG: 25 TABLET ORAL at 09:22

## 2021-06-08 RX ADMIN — OXYCODONE HYDROCHLORIDE 5 MG: 5 TABLET ORAL at 06:35

## 2021-06-08 NOTE — PROGRESS NOTES
Patient discharge instructions given , PIV removed, dressing to anterior neck is clean dry and intact. No s/s of infection noted.  Patient stable for discharge home with family

## 2021-06-08 NOTE — ROUTINE PROCESS
6/8/2021 
07:30 AM 
 
End of Shift Note Bedside and verbal shift change report given to Juanita Teran RN (On coming nurse) by Ray Arellano RN (Off going nurse). Report included the following information:  
   --Procedure Summary 
   --MAR, 
   --Recent Results --Med Rec Status SBAR Recommendations:  
 
Issues for Provider to address Activity This Shift 
 
 [] Bed Rest Order 
 [] Refused 
 [] Dangled  
 [] TDWB Ambulating: 
   [x] Bathroom [] BSC [] Room/Hallway Up in Chair for meals 
  [x]Yes [] No  
Voiding       [x] Yes  [] No 
Whiteside          [] Yes  [] No 
Incontinent [] Yes  [] No 
 
DUE TO VOID POUR        [] Yes [] No 
Purewick    [] Yes [] No 
New Onset [] Yes [] No Straight Cath   []Yes  [] No 
Condom Cath  [] Yes [] No 
MD Called      [] Yes  [] No  
Blood Sugars Managed []Yes [x] No   
Bowels Moved [] Yes [x] No 
 
Incontinent     [] Yes [] No Passed Gas []Yes [x] No 
 
New Onset  []Yes [] No 
  
 
 MD Called []Yes  [] No 
  
CHG Bath Done Before Surgery After Surgery  
  
[] Yes  [x] No 
[] Yes  [x] No   
  
Drain Removed [] Yes  [x] No [] N/A Dressing Changed [] Yes   [x] No [] N/A Nausea/Vomiting [] Yes   [x] No    
Ice Packs Changed [] Yes   [] No  [x] N/A Incentive Spirometer  [x] Yes  [] No     
SCD Pumps On Ankle Pumping  [x] Yes   [] No  
  
[x] Yes   [] No    
  
Telemetry Monitoring [] Yes   [x] No   Rhythm

## 2021-06-08 NOTE — DISCHARGE SUMMARY
Discharge/Transfer  Summary     Patient: Dean Dunlaps MRN: [id number]  SSN: [id number]    YOB: 1965  Age: 49 y.o. Sex: female       Admit Date: 6/7/2021    Discharge Date: 6/8/2021      Admission Diagnoses: Cervical spondylosis with myelopathy [M47.12]    Discharge Diagnoses:   Problem List as of 6/8/2021 Date Reviewed: 6/3/2021        Codes Class Noted - Resolved    Cervical spondylosis with myelopathy ICD-10-CM: M47.12  ICD-9-CM: 721.1  6/7/2021 - Present        Multiple thyroid nodules ICD-10-CM: E04.2  ICD-9-CM: 241.1  1/11/2016 - Present        Nausea ICD-10-CM: R11.0  ICD-9-CM: 787.02  Unknown - Present        Facet arthropathy ICD-10-CM: M47.819  ICD-9-CM: 721.90  Unknown - Present        Heart murmur ICD-10-CM: R01.1  ICD-9-CM: 988. 2  Unknown - Present        Iritis ICD-10-CM: H20.9  ICD-9-CM: 364.3  Unknown - Present               Discharge Condition: Good    Hospital Course: benign      Disposition: home    Discharge Medications:   Current Discharge Medication List      START taking these medications    Details   oxyCODONE IR (Roxicodone) 5 mg immediate release tablet Take 1 Tablet by mouth every six (6) hours as needed for Pain for up to 7 days. Max Daily Amount: 20 mg.  Qty: 28 Tablet, Refills: 0    Associated Diagnoses: S/P cervical spinal fusion         CONTINUE these medications which have NOT CHANGED    Details   potassium chloride (KLOR-CON) 10 mEq tablet Take 1 Tablet by mouth daily for 7 days. Qty: 7 Tablet, Refills: 0    Associated Diagnoses: Hypokalemia      hydroCHLOROthiazide (HYDRODIURIL) 25 mg tablet Take 1 Tab by mouth daily. Qty: 90 Tab, Refills: 3      simvastatin (ZOCOR) 40 mg tablet Take 1 Tab by mouth nightly. Qty: 90 Tab, Refills: 3      MULTIVITAMINS (MULTI-VITAMIN PO) Take 1 Tab by mouth daily. medroxyprogesterone (DEPO-PROVERA) 150 mg/mL Syrg 150 mg by IntraMUSCular route every three (3) months.                Follow-up Appointments   Procedures  FOLLOW UP VISIT Appointment in: Two Weeks     Standing Status:   Standing     Number of Occurrences:   1     Order Specific Question:   Appointment in     Answer:    Two Weeks       Signed By: Marleni Nowak MD     June 8, 2021

## 2021-06-08 NOTE — PROGRESS NOTES
OCCUPATIONAL THERAPY EVALUATION/DISCHARGE    Patient: Julio Betancourt [de-identified]49 y.o. female)  Date: 6/8/2021  Primary Diagnosis: Cervical spondylosis with myelopathy [M47.12]  Procedure(s) (LRB):  ANTERIOR CERVICAL DISCECTOMY WITH FUSION C4/5 C5/6; C-ARM/K2M (N/A)  CERVICAL DISC ARTHROPLASTY C3/4; MOBI-C (N/A) 1 Day Post-Op   Precautions:   Fall, Spinal (cervical)  PLOF: Pt was independent with basic self care tasks and used no AD for functional mobility PTA. ASSESSMENT AND RECOMMENDATIONS:  Based on the objective data described below, the patient is able to perform basic self care tasks without assistance while seated and in standing. Functional transfers completed with modified independence. Cervical spine precautions reviewed and patient verbalized understanding. She has a supportive spouse at home to assist her prn. No DME needs identified. Patient left seated in recliner at end of session with all needs met. Skilled occupational therapy is not indicated at this time. Discharge Recommendations: None  Further Equipment Recommendations for Discharge: N/A      SUBJECTIVE:   Patient stated I'm doing ok.     OBJECTIVE DATA SUMMARY:     Past Medical History:   Diagnosis Date    Facet arthropathy     Heart murmur 2006    High cholesterol     Hypertension     Iritis     Low back pain     Nausea     Spinal stenosis     Thyroid disease     Goiter      Past Surgical History:   Procedure Laterality Date    HX ORTHOPAEDIC  2009    back surgery     Barriers to Learning/Limitations: None  Compensate with: visual, verbal, tactile, kinesthetic cues/model    Home Situation:   Home Situation  Home Environment: Private residence  # Steps to Enter: 3  One/Two Story Residence: One story  Living Alone: No  Support Systems: Spouse/Significant Other/Partner  Patient Expects to be Discharged to[de-identified] [employer]  Current DME Used/Available at Home: None  Tub or Shower Type: Shower  [x]     Right hand dominant   []     Left hand dominant    Cognitive/Behavioral Status:  Neurologic State: Alert  Orientation Level: Oriented X4  Cognition: Appropriate decision making; Follows commands  Safety/Judgement: Awareness of environment; Fall prevention    Skin: Intact on UEs  Edema: None noted in UEs    Vision/Perceptual:    Acuity: Within Defined Limits      Coordination: BUE  Fine Motor Skills-Upper: Left Intact; Right Intact    Gross Motor Skills-Upper: Left Intact; Right Intact    Balance:  Sitting: Intact  Standing: Intact    Strength: BUE  Strength: Within functional limits (within spinal precautions)     Tone & Sensation: BUE  Tone: Normal  Sensation: Intact     Range of Motion: BUE  AROM: Within functional limits    Functional Mobility and Transfers for ADLs:  Transfers:  Sit to Stand: Modified independent  Stand to Sit: Modified independent    Toilet Transfer : Modified independent    ADL Assessment:  Feeding: Modified independent    Oral Facial Hygiene/Grooming: Modified Independent    Bathing: Modified independent    Upper Body Dressing: Modified independent    Lower Body Dressing: Modified independent    Toileting: Modified independent    ADL Intervention:  Patient practiced UB/LB bathing (CHG wipes) and dressing while seated and in standing. No assist given after initial VCs for safety/ease of performance. She was modified independent with standing balance. Extra time given for precautions. No LOB noted. Cognitive Retraining  Safety/Judgement: Awareness of environment; Fall prevention    Pain:  Pain level pre-treatment: 0/10   Pain level post-treatment: 0/10   Pain Intervention(s): Medication (see MAR); Rest, Ice, Repositioning   Response to intervention: Nurse notified, See doc flow    Activity Tolerance:   Good  Please refer to the flowsheet for vital signs taken during this treatment.   After treatment:   [x]  Patient left in no apparent distress sitting up in chair  []  Patient left in no apparent distress in bed  [x]  Call bell left within reach  [x]  Nursing notified  []  Caregiver present  []  Bed alarm activated    COMMUNICATION/EDUCATION:   [x]      Role of Occupational Therapy in the acute care setting  [x]      Home safety education was provided and the patient/caregiver indicated understanding. [x]      Patient/family have participated as able and agree with findings and recommendations. []      Patient is unable to participate in plan of care at this time. Thank you for this referral.  Edith Brennan MS OTR/L   Time Calculation: 26 mins      Eval Complexity: History: LOW Complexity : Brief history review ; Examination: LOW Complexity : 1-3 performance deficits relating to physical, cognitive , or psychosocial skils that result in activity limitations and / or participation restrictions ;    Decision Making:LOW Complexity : No comorbidities that affect functional and no verbal or physical assistance needed to complete eval tasks

## 2021-06-08 NOTE — PROGRESS NOTES
Discharge order noted for today. Orders reviewed. No needs identified at this time.  remains available if needed.   Mellie Primrose RN - Outcomes Manager  [phone]

## 2021-06-08 NOTE — PROGRESS NOTES
conducted an initial consultation and Spiritual Assessment for [facility], who is a 49 y.o.,female. Patients Primary Language is: [place]. According to the patients EMR Mu-ism Affiliation is: Sabianist.     The reason the Patient came to the hospital is:   Patient Active Problem List    Diagnosis Date Noted    Cervical spondylosis with myelopathy 06/07/2021    Multiple thyroid nodules 01/11/2016    Nausea     Facet arthropathy     Heart murmur     Iritis         The  provided the following Interventions:  Initiated a relationship of care and support  With patient in room 2213 this morning on this day one post surgery. Listened empathically as patient talked about her surgery and the pain level she is now having as a result of surgery. Patient is hopeful for a speedy recovery and to feel better finally. Provided information about Spiritual Care Services. Offered prayer and assurance of continued prayers on patients behalf. The following outcomes were achieved:  Patient shared limited information about her medical narrative and spiritual journey/beliefs. Patient processed feeling about current hospitalization. Patient expressed gratitude for pastoral care visit. Assessment:  Patient does not have any Temple/cultural needs that will affect patients preferences in health care. There are no further spiritual or Temple issues which require Spiritual Care Services interventions at this time. Plan:  Chaplains will continue to follow and will provide pastoral care on an as needed/requested basis    . Cata Dawson   Spiritual Care   [phone]

## 2021-06-08 NOTE — PROGRESS NOTES
Problem: Falls - Risk of  Goal: *Absence of Falls  Description: Document Lear Shaker Fall Risk and appropriate interventions in the flowsheet.   Outcome: Progressing Towards Goal  Note: Fall Risk Interventions:  Mobility Interventions: Patient to call before getting OOB, Utilize walker, cane, or other assistive device         Medication Interventions: Patient to call before getting OOB, Teach patient to arise slowly    Elimination Interventions: Call light in reach              Problem: Patient Education: Go to Patient Education Activity  Goal: Patient/Family Education  Outcome: Progressing Towards Goal     Problem: Simple Spine Procedure:  Post Op Day 1/Day of Discharge  Goal: Off Pathway (Use only if patient is Off Pathway)  Outcome: Progressing Towards Goal  Goal: Activity/Safety  Outcome: Progressing Towards Goal  Goal: Nutrition/Diet  Outcome: Progressing Towards Goal  Goal: Discharge Planning  Outcome: Progressing Towards Goal  Goal: Medications  Outcome: Progressing Towards Goal  Goal: Respiratory  Outcome: Progressing Towards Goal  Goal: Treatments/Interventions/Procedures  Outcome: Progressing Towards Goal  Goal: Psychosocial  Outcome: Progressing Towards Goal     Problem: Simple Spine Procedure: Discharge Outcomes  Goal: *Optimal pain control at patient's stated goal  Outcome: Progressing Towards Goal  Goal: *Demonstrates ability to place and remove stabilization device  Outcome: Progressing Towards Goal  Goal: *Progress independence mobility/activities (eg: Mobility precautions)  Outcome: Progressing Towards Goal  Goal: *Resumes normal function of bladder and bowel  Outcome: Progressing Towards Goal  Goal: *Lungs clear or at baseline  Outcome: Progressing Towards Goal  Goal: *Verbalizes name, dosage, time, side effects, and number of days to continue medications  Outcome: Progressing Towards Goal  Goal: *Modified independence with transfers, ambulation on levels with assistance devices, stair climbing, ADL's  Outcome: Progressing Towards Goal  Goal: *Describes follow-up/return visits to physicians  Outcome: Progressing Towards Goal  Goal: *Describes available resources and support systems  Outcome: Progressing Towards Goal  Goal: *Labs within defined limits  Outcome: Progressing Towards Goal  Goal: *Tolerating diet  Outcome: Progressing Towards Goal     Problem: Patient Education: Go to Patient Education Activity  Goal: Patient/Family Education  Outcome: Progressing Towards Goal     Problem: Pain  Goal: *Control of Pain  Outcome: Progressing Towards Goal     Problem: Patient Education: Go to Patient Education Activity  Goal: Patient/Family Education  Outcome: Progressing Towards Goal

## 2021-06-08 NOTE — DISCHARGE INSTRUCTIONS
Dr. Mahad Rivera Instructions: Cervical Surgery    DO NOT take any NSAIDS if you had Fusion Surgery. ACTIVITIES:  1. Change positions every hour while you are awake. Walking is the best way to rebuild strength. 2. Wear your soft collar just for comfort if it is provided to you. You may remove if desired. 3. Sleep with your head elevated for 2-3 nights after surgery to prevent swelling. 4. Avoid strenuous activity, such as yard work, vacuuming, or lifting anything heavier than a gallon of milk. 5. Avoid strenuous activities, such as vacuuming, and do not lift anything heavier than 1 gallon of milk (or about 5-8 pounds). 1. Walk at a pace that avoids fatigue or severe pain. Do not try to walk several blocks the first day! 2. Follow-up with Dr. Yanira Jolley will be 7-10 days from surgery. BATHING and INCISION CARE:  1. The incision may be tender or feel numb: this is normal.   2. Keep the incision clean and dry. You may shower 3 days after surgery. Cover the dressing with saran wrap before getting in the shower. The incision is closed with sutures under the skin and glue on top. 3. Do not apply any lotions, ointments or oils on the incision. 4. Do not remove the dressing. Your dressing will be changed at your first post op appointment. If it comes loose or is damaged, dirty or wet before this appointment, call your home health nurse (if you are being seen by a nurse at home) or the office to have the dressing changed. 5. If you notice any excessive swelling, redness, or persistent drainage around the incision, notify the office immediately. CONSTIPATION:  1. Take a stool softener twice a day while you are taking a narcotic. 2. If you have not had a bowel movement within 3 days of surgery, you will need to use a laxative or suppository that can be obtained over the counter at your local pharmacy     ICE  1. Use ice on your neck and shoulders to decrease pain and swelling.   Do NOT use heat. MEDICATIONS:  1. If you had fusion surgery DO NOT TAKE non-steroidal anti-inflammatory (NSAID) medications, such as Motrin, Aleve, Advil Naprosyn, Ibuprofen or aspirin. 2. Take Tylenol/Acetaminophen every 4-6 hours for pain. Do not take more than 4000 mg each day. (Do not take Tylenol/Acetaminophen if you have liver problems). 3. Take your prescribed narcotic pain medication as needed for pain that is not tolerable. 4. Eat food before you take any pain medication to avoid nausea. 5. If you need a medication refill, please call the office during working hours at least 2 days before your prescription runs out. Do not wait until your bottle is empty to call for a refill. EATING & NUTRITION:  1. You can eat anything you can chew and make soft. Take small bites and avoid tough foods like meat and bread. 2. Painful swallowing is normal after surgery and may be experienced up to 2 weeks post-operatively. 3. You may obtain over the counter Chloraseptic spray. The more you swallow the better your throat will feel. 4. If at any point you can no longer swallow your own saliva, call Dr. Jorge Chandler office right away. 5. Eat a healthy balanced diet to help your wound to heal. Protein supplements should be considered if you are eating less than 50% of your meal.   6. Drink plenty of water to stay hydrated. DRIVING & RETURN TO WORK:   1. You will be told at your follow up appointment if it is safe for you to drive or return to work and will be provided with a [id number]-note if needed (please ask). 2. NEVER drive while taking narcotic medication.     WHEN TO CALL THE OFFICE:   If you have severe pain unrelieved by the medications, new numbness or tingling in your legs;        If you have a fever of 101.0°F or greater    If you notice increased swelling, redness, or increased drainage from the incision     If you are not able to urinate   If you are not able to control your bowels    If you are unable to swallow your own saliva      [address] [facility],  [address] number is [phone]. They are open from 8:00am to 5:00pm Mon - Fri. After 5:00pm, or on weekends/holidays, please call the answering service at [phone] for a call back.

## 2021-06-08 NOTE — PROGRESS NOTES
Problem: Dysphagia (Adult)  Goal: *Acute Goals and Plan of Care (Insert Text)  Description: Patient will:  1. Tolerate regular/easy to chew diet with thin liquids without overt s/sx of aspiration under SLP supervision. 2. Utilize compensatory swallow strategies of small bite/sip, alternate liquid/solid with min cues in 4/5 trials. Rec:   Regular/easy to chew diet, thin liquids  HOB >45 during po intake, remain >30 for 30-45 minutes after po   Small bites/sips; alternate liquid/solid, slow feeding rate   Oral care TID  Meds per pt preference  Follow up with MD if s/sx persist beyond 4-6 weeks for possible further intervention  Outcome: Progressing Towards Goal       SPEECH [address]   EVALUATION & TREATMENT     Patient: Lucio Denton [de-identified]49 y.o. female)  Date: 6/8/2021  Primary Diagnosis: Cervical spondylosis with myelopathy [M47.12]  Procedure(s) (LRB):  ANTERIOR CERVICAL DISCECTOMY WITH FUSION C4/5 C5/6; C-ARM/K2M (N/A)  CERVICAL DISC ARTHROPLASTY C3/4; MOBI-C (N/A) 1 Day Post-Op   Precautions:  Fall, Spinal  PLOF: Per H&P    ASSESSMENT :  Based on the objective data described below, the patient presents with mild pharyngeal dysphagia s/p ACDF at level C3-4, C4-5, and C5-6. Pt seen for swallow eval. Pt AOx4, accepting of eval. Pt c/o difficulty swallowing s/p surgery stating \"I just have to swallow twice sometimes\", reporting regular diet prior to current admission. Oral motor structures, strength, and ROM WFL; grossly intact for mastication and deglutition. Functional communication. Intelligibility >90%. Cognitive-linguistic function appears intact. Pt self-feeding PO trials of thin and nectar thick liquid via cup/straw and tandem drinking, puree, mixed, and regular textures. Mild throat clearing consistent across consistencies and trials, repeat swallow alleviated throat clearing. Pt reporting she has been sticking to softer texture food, as well.  Swallow appears timely, unable to plapate for laryngeal elevation d/t soft c-collar in place, no change in vocal/resp quality appreciated. Recommend regular/easy to chew texture diet with thin liquids, meds per pt preference. Pt educated to should sx persist fro 4-6 weeks, contact MD for possible further SLP intervention, verbalized understanding. TREATMENT :  Skilled therapy initiated; Educated pt on dysphagia s/p ACDF surgery, aspiration precautions and importance of compensatory swallow techniques to decrease aspiration risk (decrease rate of intake & sip/bite size, upright @HOB for all po intake and ~30 minutes after po); verbalized comprehension. SLP to follow as indicated. Patient will benefit from skilled intervention to address the above impairments. Patient's rehabilitation potential is considered to be Excellent  Factors which may influence rehabilitation potential include:  [x]            None noted  []            Mental ability/status  []            Medical condition  []            Home/family situation and support systems  []            Safety awareness  []            Pain tolerance/management  []            Other:      PLAN :  Recommendations and Planned Interventions:  See above. Frequency/Duration: Patient will be followed by speech-language pathology 1-2 times per day/4-7 days per week to address goals. Discharge Recommendations: Outpatient as necessary     SUBJECTIVE:   Patient stated Charli Lin just have to swallow a couple times.     OBJECTIVE:     Past Medical History:   Diagnosis Date    Facet arthropathy     Heart murmur 2006    High cholesterol     Hypertension     Iritis     Low back pain     Nausea     Spinal stenosis     Thyroid disease     Goiter      Past Surgical History:   Procedure Laterality Date    HX ORTHOPAEDIC  2009    back surgery     Home Situation:   Home Situation  Home Environment: Private residence  # Steps to Enter: 3  One/Two Story Residence: One story  Living Alone: No  Support Systems: Spouse/Significant Other/Partner  Patient Expects to be Discharged to[de-identified] House  Current DME Used/Available at Home: None    Diet prior to admission: regular/thin  Current Diet:  regular/easy to chew, thin liquid     Cognitive and Communication Status:  Neurologic State: Alert  Orientation Level: Oriented X4  Cognition: Follows commands  Perception: Appears intact  Perseveration: No perseveration noted  Safety/Judgement: Fall prevention  Oral Assessment:  Oral Assessment  Labial: No impairment  Dentition: Natural  Oral Hygiene: WFL  Lingual: No impairment  Velum: No impairment  Mandible: No impairment  P.O. Trials:  Patient Position: Up in chair  Vocal quality prior to P.O.: No impairment  Consistency Presented: Thin liquid;Puree; Solid;Mixed consistency  How Presented: Self-fed/presented;Straw;Successive swallows;Spoon     Bolus Acceptance: No impairment  Bolus Formation/Control: No impairment     Propulsion: No impairment  Oral Residue: None  Initiation of Swallow: No impairment  Laryngeal Elevation: Other (comment) (unable to palpate due to soft c-collar in place)  Aspiration Signs/Symptoms: Clear throat  Pharyngeal Phase Characteristics: Audible swallow; Suspected pharyngeal residue  Effective Modifications: Alternate liquids/solids; Double swallow;Small sips and bites  Cues for Modifications: Minimal       Oral Phase Severity: No impairment  Pharyngeal Phase Severity : Mild    PAIN:  Pain level pre-treatment: 0/10   Pain level post-treatment: 0/10     After treatment:   []            Patient left in no apparent distress sitting up in chair  [x]            Patient left in no apparent distress in bed  [x]            Call bell left within reach  [x]            Nursing notified  []            Family present  []            Caregiver present  []            Bed alarm activated    COMMUNICATION/EDUCATION:   [x]            Aspiration precautions; swallow safety; compensatory techniques.   [x]            Patient/family have participated as able in goal setting and plan of care. [x]            Patient/family agree to work toward stated goals and plan of care. []            Patient understands intent and goals of therapy; neutral about participation. []            Patient unable to participate in goal setting/plan of care; educ ongoing with interdisciplinary staff  []         Posted safety precautions in patient's room.     Thank you for this referral.  MOISE Contreras  Time Calculation: 28 mins  Evaluation Time: 15 minutes   Treatment Time: 13 minutes

## 2021-06-08 NOTE — PROGRESS NOTES
Problem: Mobility Impaired (Adult and Pediatric)  Goal: *Acute Goals and Plan of Care (Insert Text)  Description: Physical Therapy Goals  Initiated 6/7/2021 and to be accomplished within 7 day(s)  1. Patient will move from supine to sit and sit to supine  in bed with modified independence. 2.  Patient will transfer from bed to chair and chair to bed with modified independence using the least restrictive device. 3.  Patient will perform sit to stand with modified independence. 4.  Patient will ambulate with modified independence for 200 feet with the least restrictive device. 5.  Patient will ascend/descend 3 stairs with handrail(s) with modified independence. PLOF: Patient was independent with self care and functional mobility. She lives with her spouse in single story home with 3 [address]. Outcome: Resolved/Met   PHYSICAL THERAPY TREATMENT AND DISCHARGE    Patient: Sheryl Rowley [de-identified]49 y.o. female)  Date: 6/8/2021  Diagnosis: Cervical spondylosis with myelopathy [M47.12] <principal problem not specified>  Procedure(s) (LRB):  ANTERIOR CERVICAL DISCECTOMY WITH FUSION C4/5 C5/6; C-ARM/K2M (N/A)  CERVICAL DISC ARTHROPLASTY C3/4; MOBI-C (N/A) 1 Day Post-Op  Precautions: Fall, Spinal  ASSESSMENT:  Seated in recliner fully dressed upon entry. Soft cervical collar donned. Mod I for sit to stand. Amb 250ft with no AD and steady gait. Decreased gait speed. Educated on importance of mobility at home; verbalized understanding. Completed 4 steps with bilateral handrails and non-reciprocal gait. Returned to seated in recliner at end of session. Denies mobility concerns with discharge to home. Call bell in reach. Patient is cleared by PT for discharge to home. PLAN:  Further skilled physical therapy is not indicated at this time.   Rationale for discharge:  [x]     Goals Achieved  []     [address]  []     Patient not participating in therapy  []     Other:  Discharge Recommendations:  None  Further Equipment Recommendations for Discharge:  None     SUBJECTIVE:   Patient stated We live in [facility].     OBJECTIVE DATA SUMMARY:   Critical Behavior:  Neurologic State: Alert  Orientation Level: Oriented X4  Cognition: Follows commands     Psychosocial  Patient Behaviors: Cooperative  Functional Mobility Training:  Transfers:  Sit to Stand: Modified independent  Stand to Sit: Modified independent  Balance:   Sitting: Intact  Standing: Intact  Ambulation/Gait Training:  Distance (ft): 250 Feet (ft)   Ambulation - Level of Assistance: Independent  Stairs:   Level of Assistance: Modified independent  Rail Use: both  Number of Stairs: 4    Pain:  Pain level pre-treatment: 0/10   Pain level post-treatment: 0/10     Activity Tolerance:   Good    After treatment:   [x] Patient left in no apparent distress sitting up in chair  [] Patient left in no apparent distress in bed  [x] Call bell left within reach  [x] Nursing notified  [] Caregiver present  [] Bed alarm activated  [] SCDs applied      COMMUNICATION/EDUCATION:   [x]         Role of physical therapy in the acute care setting. [x]         Fall prevention education was provided and the patient/caregiver indicated understanding. [x]         Patient/family have participated as able and agree with findings and recommendations. []         Patient is unable to participate in plan of care at this time.        Aman Abreu, PT   Time Calculation: 9 mins

## 2021-06-08 NOTE — PROGRESS NOTES
Problem: Falls - Risk of  Goal: *Absence of Falls  Description: Document Ashutosh Shove Fall Risk and appropriate interventions in the flowsheet.   6/8/2021 1111 by Larry Jane  Outcome: Resolved/Met  6/8/2021 1026 by Larry Jane  Outcome: Progressing Towards Goal  Note: Fall Risk Interventions:  Mobility Interventions: Patient to call before getting OOB, Utilize walker, cane, or other assistive device         Medication Interventions: Patient to call before getting OOB, Teach patient to arise slowly    Elimination Interventions: Call light in reach              Problem: Patient Education: Go to Patient Education Activity  Goal: Patient/Family Education  6/8/2021 1111 by Larry Jane  Outcome: Resolved/Met  6/8/2021 1026 by Larry Jane  Outcome: Progressing Towards Goal     Problem: Simple Spine Procedure:  Post Op Day 1/Day of Discharge  Goal: Off Pathway (Use only if patient is Off Pathway)  6/8/2021 1111 by Larry Jane  Outcome: Resolved/Met  6/8/2021 1026 by Larry Jane  Outcome: Progressing Towards Goal  Goal: Activity/Safety  6/8/2021 1111 by Larry Jane  Outcome: Resolved/Met  6/8/2021 1026 by Larry Jane  Outcome: Progressing Towards Goal  Goal: Nutrition/Diet  6/8/2021 1111 by Larry Jane  Outcome: Resolved/Met  6/8/2021 1026 by Larry Jane  Outcome: Progressing Towards Goal  Goal: Discharge Planning  6/8/2021 1111 by Larry Jane  Outcome: Resolved/Met  6/8/2021 1026 by Larry Jane  Outcome: Progressing Towards Goal  Goal: Medications  6/8/2021 1111 by Larry Jane  Outcome: Resolved/Met  6/8/2021 1026 by Larry Jane  Outcome: Progressing Towards Goal  Goal: Respiratory  6/8/2021 1111 by Larry Jane  Outcome: Resolved/Met  6/8/2021 1026 by Larry Jane  Outcome: Progressing Towards Goal  Goal: Treatments/Interventions/Procedures  6/8/2021 1111 by Larry Jane  Outcome: Resolved/Met  6/8/2021 1026 by Larry Jane  Outcome: Progressing Towards Goal  Goal: Psychosocial  6/8/2021 1111 by Theodora Kessler  Outcome: Resolved/Met  6/8/2021 1026 by Theodora Kessler  Outcome: Progressing Towards Goal     Problem: Simple Spine Procedure: Discharge Outcomes  Goal: *Optimal pain control at patient's stated goal  6/8/2021 1111 by Theodora Kessler  Outcome: Resolved/Met  6/8/2021 1026 by Theodora Kessler  Outcome: Progressing Towards Goal  Goal: *Demonstrates ability to place and remove stabilization device  6/8/2021 1111 by Theodora Kessler  Outcome: Resolved/Met  6/8/2021 1026 by Theodora Kessler  Outcome: Progressing Towards Goal  Goal: *Progress independence mobility/activities (eg: Mobility precautions)  6/8/2021 1111 by Theodora Kessler  Outcome: Resolved/Met  6/8/2021 1026 by Theodora Kessler  Outcome: Progressing Towards Goal  Goal: *Resumes normal function of bladder and bowel  6/8/2021 1111 by Theodora Kessler  Outcome: Resolved/Met  6/8/2021 1026 by Theodora Kessler  Outcome: Progressing Towards Goal  Goal: *Lungs clear or at baseline  6/8/2021 1111 by Theodora Kessler  Outcome: Resolved/Met  6/8/2021 1026 by Theodora Kessler  Outcome: Progressing Towards Goal  Goal: *Verbalizes name, dosage, time, side effects, and number of days to continue medications  6/8/2021 1111 by Theodora Kessler  Outcome: Resolved/Met  6/8/2021 1026 by Theodora Kessler  Outcome: Progressing Towards Goal  Goal: *Modified independence with transfers, ambulation on levels with assistance devices, stair climbing, ADL's  6/8/2021 1111 by Theodora Kessler  Outcome: Resolved/Met  6/8/2021 1026 by Theodora Kessler  Outcome: Progressing Towards Goal  Goal: *Describes follow-up/return visits to physicians  6/8/2021 1111 by Theodora Kessler  Outcome: Resolved/Met  6/8/2021 1026 by Theodora Kessler  Outcome: Progressing Towards Goal  Goal: *Describes available resources and support systems  6/8/2021 1111 by Theodora Kessler  Outcome: Resolved/Met  6/8/2021 1026 by Jaida Espinoza  Outcome: Progressing Towards Goal  Goal: *Labs within defined limits  6/8/2021 1111 by Jaida Espinoza  Outcome: Resolved/Met  6/8/2021 1026 by Jaida Espinoza  Outcome: Progressing Towards Goal  Goal: *Tolerating diet  6/8/2021 1111 by Jaida Espinoza  Outcome: Resolved/Met  6/8/2021 1026 by Jaida Espinoza  Outcome: Progressing Towards Goal     Problem: Patient Education: Go to Patient Education Activity  Goal: Patient/Family Education  6/8/2021 1111 by Jaida Espinoza  Outcome: Resolved/Met  6/8/2021 1026 by Jaida Espinoza  Outcome: Progressing Towards Goal     Problem: Pain  Goal: *Control of Pain  6/8/2021 1111 by Jaida Espinoza  Outcome: Resolved/Met  6/8/2021 1026 by Jaida Espinoza  Outcome: Progressing Towards Goal     Problem: Patient Education: Go to Patient Education Activity  Goal: Patient/Family Education  6/8/2021 1111 by Jaida Espinoza  Outcome: Resolved/Met  6/8/2021 1026 by Jaida Espinoza  Outcome: Progressing Towards Goal     Problem: Patient Education: Go to Patient Education Activity  Goal: Patient/Family Education  Outcome: Resolved/Met

## 2021-06-08 NOTE — PROGRESS NOTES
Reason for Admission:  Cervical spondylosis with myelopathy [M47.12]                 RUR Score:    7%            Plan for utilizing home health:    No, Physical therapy cleared patient, pt doesn't feel she needs it. MD aware. Likelihood of Readmission:   LOW                         Transition of Care Plan:              Initial assessment completed with patient. Cognitive status of patient: oriented to time, place, person and situation. Face sheet information confirmed:  yes. The patient designates  All [place] to participate in her discharge plan and to receive any needed information. This patient lives in a single family home with . Patient is able to navigate steps as needed. Prior to hospitalization, patient was considered to be independent with ADLs/IADLS : yes . Patient has a current ACP document on file: no      Healthcare Decision Maker:     Click here to complete [address] including selection of the Healthcare Decision Maker Relationship (ie \"Primary\")    The  will be available to transport patient home upon discharge. The patient has no DME available in the home. Patient is not currently active with home health. Patient has not stayed in a skilled nursing facility or rehab. This patient is on dialysis :no    Currently, the discharge plan is Home. The patient states that she can obtain her medications from the pharmacy, and take her medications as directed. Patient's current insurance is [employer]. Care Management Interventions  PCP Verified by CM:  Yes  Mode of Transport at Discharge: Self  Transition of Care Consult (CM Consult): Discharge Planning  Current Support Network: Lives with Spouse  Confirm Follow Up Transport: Family  Discharge Location  Discharge Placement: Home        Mellie Primrose RN - Outcomes Manager  [phone]

## 2021-06-09 ENCOUNTER — HOME HEALTH ADMISSION (OUTPATIENT)
Dept: HOME HEALTH SERVICES | Facility: HOME HEALTH | Age: 56
End: 2021-06-09
Payer: OTHER GOVERNMENT

## 2021-06-09 NOTE — PROGRESS NOTES
Received call from patient stating she had spoke with Dr Susan Price and has decided to have home health services afterall. Pt states she has no preference for agencies; she is okay with [facility]. Spoke with Zhou Kern at [facility] to verify acceptance; entered referral into queue.   Mellie Primrose RN - Outcomes Manager  [phone]

## 2021-06-09 NOTE — HOME CARE
Patient had discharged yesterday on 6/8/21. Referral for [facility] received today 6/9/21 and processed. Spoke with patient to verify address and explained [facility] care services. Patient to receive SN & PT per Dr. Martina Vidales protocol. [facility] referral completed and sent to central intake.

## 2021-06-10 ENCOUNTER — HOME CARE VISIT (OUTPATIENT)
Dept: SCHEDULING | Facility: HOME HEALTH | Age: 56
End: 2021-06-10
Payer: OTHER GOVERNMENT

## 2021-06-10 VITALS
HEART RATE: 88 BPM | TEMPERATURE: 97.5 F | RESPIRATION RATE: 21 BRPM | OXYGEN SATURATION: 100 % | DIASTOLIC BLOOD PRESSURE: 80 MMHG | SYSTOLIC BLOOD PRESSURE: 130 MMHG

## 2021-06-10 PROCEDURE — 3331090001 HH PPS REVENUE CREDIT

## 2021-06-10 PROCEDURE — 400013 HH SOC

## 2021-06-10 PROCEDURE — G0151 HHCP-SERV OF PT,EA 15 MIN: HCPCS

## 2021-06-10 PROCEDURE — 3331090002 HH PPS REVENUE DEBIT

## 2021-06-10 PROCEDURE — 400018 HH-NO PAY CLAIM PROCEDURE

## 2021-06-11 ENCOUNTER — HOME CARE VISIT (OUTPATIENT)
Dept: HOME HEALTH SERVICES | Facility: HOME HEALTH | Age: 56
End: 2021-06-11
Payer: OTHER GOVERNMENT

## 2021-06-11 ENCOUNTER — OFFICE VISIT (OUTPATIENT)
Dept: FAMILY MEDICINE CLINIC | Age: 56
End: 2021-06-11
Payer: OTHER GOVERNMENT

## 2021-06-11 ENCOUNTER — HOME CARE VISIT (OUTPATIENT)
Dept: SCHEDULING | Facility: HOME HEALTH | Age: 56
End: 2021-06-11
Payer: OTHER GOVERNMENT

## 2021-06-11 VITALS
OXYGEN SATURATION: 100 % | SYSTOLIC BLOOD PRESSURE: 140 MMHG | DIASTOLIC BLOOD PRESSURE: 78 MMHG | TEMPERATURE: 97.3 F | HEART RATE: 91 BPM | RESPIRATION RATE: 18 BRPM

## 2021-06-11 VITALS
TEMPERATURE: 97.3 F | RESPIRATION RATE: 18 BRPM | HEIGHT: 66 IN | DIASTOLIC BLOOD PRESSURE: 62 MMHG | WEIGHT: 177.6 LBS | SYSTOLIC BLOOD PRESSURE: 114 MMHG | HEART RATE: 115 BPM | BODY MASS INDEX: 28.54 KG/M2 | OXYGEN SATURATION: 99 %

## 2021-06-11 DIAGNOSIS — M47.12 CERVICAL SPONDYLOSIS WITH MYELOPATHY: Primary | ICD-10-CM

## 2021-06-11 DIAGNOSIS — Z09 HOSPITAL DISCHARGE FOLLOW-UP: ICD-10-CM

## 2021-06-11 PROCEDURE — G0299 HHS/HOSPICE OF RN EA 15 MIN: HCPCS

## 2021-06-11 PROCEDURE — 3331090001 HH PPS REVENUE CREDIT

## 2021-06-11 PROCEDURE — 3331090002 HH PPS REVENUE DEBIT

## 2021-06-11 PROCEDURE — 99213 OFFICE O/P EST LOW 20 MIN: CPT | Performed by: NURSE PRACTITIONER

## 2021-06-11 NOTE — PROGRESS NOTES
Shari Weeks is a 49 y.o. female who was seen in clinic today (6/11/2021) for Hospital Follow Up ( Cervical spondylosis with myelopathy )      Assessment & Plan:   Diagnoses and all orders for this visit:    1. Cervical spondylosis with myelopathy    2. Hospital discharge follow-up         I have discussed the diagnosis with the patient and the intended plan as seen in the above orders. The patient has received an after-visit summary and questions were answered concerning future plans. I have discussed medication side effects and warnings with the patient as well. Patient agreeable with above plan and verbalizes understanding. Follow-up and Dispositions    · Return if symptoms worsen or fail to improve. Subjective:   Patient reports since surgery she has been doing well. Comments it seems like one of her sutures is coming loose. Patient states left shoulder pain has resolved. She does have post surgical pain at incision site and also in shoulder. Lab Results   Component Value Date/Time    Sodium 140 06/07/2021 09:41 AM    Potassium 3.4 (L) 06/07/2021 09:41 AM    Chloride 107 06/07/2021 09:41 AM    CO2 27 06/07/2021 09:41 AM    Anion gap 6 06/07/2021 09:41 AM    Glucose 111 (H) 06/07/2021 09:41 AM    BUN 11 06/07/2021 09:41 AM    Creatinine 0.75 06/07/2021 09:41 AM    BUN/Creatinine ratio 15 06/07/2021 09:41 AM    GFR est AA >60 06/07/2021 09:41 AM    GFR est non-AA >60 06/07/2021 09:41 AM    Calcium 9.1 06/07/2021 09:41 AM    Bilirubin, total 0.6 03/15/2021 10:22 AM    Alk.  phosphatase 75 03/15/2021 10:22 AM    Protein, total 7.7 03/15/2021 10:22 AM    Albumin 4.0 03/15/2021 10:22 AM    Globulin 3.7 03/15/2021 10:22 AM    A-G Ratio 1.1 03/15/2021 10:22 AM    ALT (SGPT) 19 03/15/2021 10:22 AM    AST (SGOT) 14 03/15/2021 10:22 AM     Lab Results   Component Value Date/Time    Cholesterol, total 190 03/15/2021 10:22 AM    HDL Cholesterol 41 03/15/2021 10:22 AM    LDL, calculated 140 (H) 03/15/2021 10:22 AM    VLDL, calculated 9 03/15/2021 10:22 AM    Triglyceride 45 03/15/2021 10:22 AM    CHOL/HDL Ratio 4.6 03/15/2021 10:22 AM     Lab Results   Component Value Date/Time    Hemoglobin A1c 5.6 03/15/2021 10:22 AM     No results found for: Emilee Stewart, [id number]    Lab Results   Component Value Date/Time    WBC 9.2 06/02/2021 08:14 AM    HGB 12.3 06/02/2021 08:14 AM    HCT 37.4 06/02/2021 08:14 AM    PLATELET 621 56/33/6614 08:14 AM    MCV 86.0 06/02/2021 08:14 AM       Wt Readings from Last 3 Encounters:   06/11/21 177 lb 9.6 oz (80.6 kg)   06/07/21 177 lb (80.3 kg)   06/03/21 178 lb (80.7 kg)     Temp Readings from Last 3 Encounters:   06/11/21 97.3 °F (36.3 °C) (Temporal)   06/10/21 97.5 °F (36.4 °C)   06/08/21 97.7 °F (36.5 °C)     BP Readings from Last 3 Encounters:   06/11/21 114/62   06/10/21 130/80   06/08/21 136/79     Pulse Readings from Last 3 Encounters:   06/11/21 (!) 115   06/10/21 88   06/08/21 92       Prior to Admission medications    Medication Sig Start Date End Date Taking? Authorizing Provider   oxyCODONE IR (Roxicodone) 5 mg immediate release tablet Take 1 Tablet by mouth every six (6) hours as needed for Pain for up to 7 days. Max Daily Amount: 20 mg. 6/8/21 6/15/21 Yes Nick Callahan MD   hydroCHLOROthiazide (HYDRODIURIL) 25 mg tablet Take 1 Tab by mouth daily. 12/24/20  Yes Parish Sanon MD   simvastatin (ZOCOR) 40 mg tablet Take 1 Tab by mouth nightly. 11/3/20  Yes Parish Sanon MD   medroxyprogesterone (DEPO-PROVERA) 150 mg/mL Syrg 150 mg by IntraMUSCular route every three (3) months. Yes Provider, Historical   MULTIVITAMINS (MULTI-VITAMIN PO) Take 1 Tab by mouth daily. Yes Provider, Historical   potassium chloride (KLOR-CON) 10 mEq tablet Take 1 Tablet by mouth daily for 7 days. 6/3/21 6/10/21  Marti Obregon NP     The following sections were reviewed & updated as appropriate: PMH, PSH, FH, and SH.     Review of Systems   Constitutional: Negative for activity change, appetite change, chills, fatigue and fever. Respiratory: Negative for chest tightness and shortness of breath. Cardiovascular: Negative for chest pain. Neurological: Negative for dizziness and headaches. Objective:     Visit Vitals  /62 (BP 1 Location: Right arm, BP Patient Position: Sitting, BP Cuff Size: Adult long)   Pulse (!) 115   Temp 97.3 °F (36.3 °C) (Temporal)   Resp 18   Ht 5' 6\" (1.676 m)   Wt 177 lb 9.6 oz (80.6 kg)   SpO2 99%   BMI 28.67 kg/m²      Physical Exam  Constitutional:       General: She is not in acute distress. Appearance: She is well-developed. HENT:      Head: Normocephalic and atraumatic. Neck:      Vascular: No carotid bruit. Comments: Dressing to anterior neck intake. 4x4 gauze removed from site of ELIZABETH drain insertion per patient request due to discomfort. Patient tolerated well, ELIZABETH drain site without erythema or drainage. Non stick telfa dressing applied. Cardiovascular:      Rate and Rhythm: Normal rate and regular rhythm. Heart sounds: Normal heart sounds. No murmur heard. No friction rub. No gallop. Pulmonary:      Effort: Pulmonary effort is normal.      Breath sounds: Normal breath sounds. No decreased breath sounds, wheezing, rhonchi or rales. Musculoskeletal:      Cervical back: Normal range of motion and neck supple. Lymphadenopathy:      Cervical: No cervical adenopathy. Skin:     General: Skin is warm and dry. Neurological:      Mental Status: She is alert and oriented to person, place, and time. Disclaimer: The patient understands our medical plan. Alternatives have been explained and offered. The risks, benefits and significant side effects of all medications have been reviewed. Anticipated time course and progression of condition reviewed. All questions have been addressed. She is encouraged to employ the information provided in the after visit summary, which was reviewed. Where applicable, she is instructed to call the clinic if she has not been notified either by phone or through [address] with the results of her tests or with an appointment plan for any referrals within 1 week(s). No news is not good news; it's no news. The patient  is to call if her condition worsens or fails to improve or if significant side effects are experienced. Aspects of this note may have been generated using voice recognition software. Despite editing, there may be unrecognized errors.        Helio Lozano NP

## 2021-06-11 NOTE — PATIENT INSTRUCTIONS
Surgery for Cervical Myelopathy: What to Expect at [facility] Your Recovery Surgery for cervical myelopathy (say \"eb-tiy-HI-uh-thee\") removes any tissues that are pressing on the spinal cord. Your doctor made a cut (incision) in the skin over the spine where the pressure on the spinal cord occurred. Then the doctor used special tools through the incision to do the surgery. You can expect your neck to feel stiff or sore after surgery. In the weeks after your surgery, it may be hard for you to sit or  one position for very long. You may need pain medicine. It will probably take 4 to 6 weeks to get back to doing your usual activities. But it may depend on what kind of surgery you had. Your doctor may advise you to work with a physical therapist to strengthen the muscles in your neck and upper back. You will need to learn how to lift, twist, and bend so that you don't put too much strain on your neck and back. Some activities may be limited. It depends on the type of surgery you had. This care sheet gives you a general idea about how long it will take for you to recover. But each person recovers at a different pace. Follow the steps below to get better as quickly as possible. How can you care for yourself at home? Activity 
  · Rest when you feel tired. Getting enough sleep will help you recover.  
  · Try to walk each day. Start by walking a little more than you did the day before. Bit by bit, increase the amount you walk. Walking boosts blood flow and helps prevent pneumonia and constipation.  
  · Avoid lifting anything that would make you strain.  This may include heavy grocery bags and milk containers, a heavy briefcase or backpack, cat litter or dog food bags, a vacuum , or a child.  
  · Avoid strenuous activities, such as bicycle riding, jogging, weight lifting, or aerobic exercise, until your doctor says it is okay.  
  · Ask your doctor when you can drive again.  
  · Avoid riding in a car for more than 30 minutes at a time for 2 to 4 weeks after surgery. If you must ride in a car for a longer distance, stop often to walk.  
  · Try to change your position about every 30 minutes while you sit or stand. This will help decrease your neck and back pain while you heal.  
  · Your time off from work depends on how quickly you feel better and on the type of work you do. If you work in an office, you likely can go back to work sooner than if you have a job where you are very active. Talk with your doctor about your work needs.  
  · You may have sex as soon as you feel able, but avoid positions that put stress on your neck or back or cause pain. Diet 
  · You can eat your normal diet. If your stomach is upset, try bland, low-fat foods like plain rice, broiled chicken, toast, and yogurt.  
  · Drink plenty of fluids (unless your doctor tells you not to).  
  · You may notice that your bowel movements are not regular right after your surgery. This is common. Try to avoid constipation and straining with bowel movements. You may want to take a fiber supplement every day. If you have not had a bowel movement after a couple of days, ask your doctor about taking a mild laxative. Medicines 
  · Your doctor will tell you if and when you can restart your medicines. He or she will also give you instructions about taking any new medicines.  
  · If you take aspirin or some other blood thinner, ask your doctor if and when to start taking it again. Make sure that you understand exactly what your doctor wants you to do.  
  · Be safe with medicines. Take pain medicines exactly as directed. ? If the doctor gave you a prescription medicine for pain, take it as prescribed. ? If you are not taking a prescription pain medicine, ask your doctor if you can take an over-the-counter medicine.  
  · If your doctor prescribed antibiotics, take them as directed. Do not stop taking them just because you feel better.  You need to take the full course of antibiotics.  
  · If you think your pain medicine is making you sick to your stomach: 
? Take your medicine after meals (unless your doctor has told you not to). ? Ask your doctor for a different pain medicine. Incision care 
  · If you have strips of tape on the cut (incision), leave the tape on for a week or until it falls off.  
  · Wash the area daily with warm, soapy water, and pat it dry. Don't use hydrogen peroxide or alcohol, which can slow healing. You may cover the area with a gauze bandage if it weeps or rubs against clothing. Change the bandage every day.  
  · Keep the area clean and dry. Exercise 
  · Do neck and back exercises as instructed by your doctor.  
  · Your doctor may recommend that you work with a physical therapist to improve the strength and flexibility of your neck and back. Other instructions 
  · Follow your doctor's instructions about wearing a brace or collar to support your neck.  
  · To reduce stiffness and help sore muscles, use a warm water bottle, a heating pad set on low, or a warm cloth on your neck. Do not put heat right over the incision. Do not go to sleep with a heating pad on your skin. Follow-up care is a key part of your treatment and safety. Be sure to make and go to all appointments, and call your doctor if you are having problems. It's also a good idea to know your test results and keep a list of the medicines you take. When should you call for help? Call 911 anytime you think you may need emergency care. For example, call if: 
  · You passed out (lost consciousness).  
  · You have sudden chest pain and shortness of breath, or you cough up blood.  
  · You are unable to move an arm or a leg at all.   
Call your doctor now or seek immediate medical care if: 
  · You have pain that does not get better after you take pain medicine.  
  · You have a headache that does not get better after you take medicine for it.  
  · You have new or worse symptoms in your arms, legs, chest, belly, or buttocks. Symptoms may include: 
? Numbness or tingling. ? Weakness. ? Pain.  
  · You lose bladder or bowel control.  
  · You have loose stitches, or your incision comes open.  
  · You have blood or fluid draining from the incision.  
  · You have signs of infection, such as: 
? Increased pain, swelling, warmth, or redness. ? Red streaks leading from the incision. ? Pus draining from the incision. ? Swollen lymph nodes in your neck, armpits or groin. ? A fever. Watch closely for changes in your health, and be sure to contact your doctor if: 
  · You are not getting better as expected. Where can you learn more? Go to http://www.gray.com/ Enter T431 in the search box to learn more about \"Surgery for Cervical Myelopathy: What to Expect at Home. \" Current as of: November 16, 2020               Content Version: 12.8 © 2006-2021 [employer]. Care instructions adapted under license by [employer] (which disclaims liability or warranty for this information). If you have questions about a medical condition or this instruction, always ask your healthcare professional. [place] [address] any warranty or liability for your use of this information.

## 2021-06-11 NOTE — PROGRESS NOTES
Dallas Hooper presents today for   Chief Complaint   Patient presents with   Major Hospital Follow Up      Cervical spondylosis with myelopathy        Is someone accompanying this pt? no    Is the patient using any DME equipment during OV? no    Depression Screening:  3 most recent PHQ Screens 6/3/2021   Little interest or pleasure in doing things Not at all   Feeling down, depressed, irritable, or hopeless Not at all   Total Score PHQ 2 0       Learning Assessment:  Learning Assessment 9/10/2019   PRIMARY LEARNER Patient   HIGHEST LEVEL OF EDUCATION - PRIMARY LEARNER  -   BARRIERS PRIMARY LEARNER -   CO-LEARNER CAREGIVER -   PRIMARY LANGUAGE ENGLISH   LEARNER PREFERENCE PRIMARY READING     -     -   ANSWERED BY patient   RELATIONSHIP SELF       Health Maintenance reviewed and discussed and ordered per Provider. Health Maintenance Due   Topic Date Due    Hepatitis C Screening  Never done    DTaP/Tdap/Td series (1 - Tdap) Never done    Shingrix Vaccine Age 50> (1 of 2) Never done    Breast Cancer Screen Mammogram  12/14/2018   . Coordination of Care:  1. Have you been to the ER, urgent care clinic since your last visit?no Hospitalized since your last visit? yes 6-8-2021 [facility] ortho    2. Have you seen or consulted any other health care providers outside of the [address] [place] [address] since your last visit? Include any pap smears or colon screening.  no

## 2021-06-12 PROCEDURE — 3331090001 HH PPS REVENUE CREDIT

## 2021-06-12 PROCEDURE — 3331090002 HH PPS REVENUE DEBIT

## 2021-06-13 PROCEDURE — 3331090001 HH PPS REVENUE CREDIT

## 2021-06-13 PROCEDURE — 3331090002 HH PPS REVENUE DEBIT

## 2021-06-14 ENCOUNTER — HOME CARE VISIT (OUTPATIENT)
Dept: SCHEDULING | Facility: HOME HEALTH | Age: 56
End: 2021-06-14
Payer: OTHER GOVERNMENT

## 2021-06-14 VITALS
DIASTOLIC BLOOD PRESSURE: 80 MMHG | TEMPERATURE: 97 F | HEART RATE: 94 BPM | OXYGEN SATURATION: 100 % | RESPIRATION RATE: 16 BRPM | SYSTOLIC BLOOD PRESSURE: 120 MMHG

## 2021-06-14 PROCEDURE — G0157 HHC PT ASSISTANT EA 15: HCPCS

## 2021-06-14 PROCEDURE — 3331090002 HH PPS REVENUE DEBIT

## 2021-06-14 PROCEDURE — 3331090001 HH PPS REVENUE CREDIT

## 2021-06-15 PROCEDURE — 3331090001 HH PPS REVENUE CREDIT

## 2021-06-15 PROCEDURE — 3331090002 HH PPS REVENUE DEBIT

## 2021-06-16 ENCOUNTER — HOME CARE VISIT (OUTPATIENT)
Dept: SCHEDULING | Facility: HOME HEALTH | Age: 56
End: 2021-06-16
Payer: OTHER GOVERNMENT

## 2021-06-16 VITALS
OXYGEN SATURATION: 98 % | RESPIRATION RATE: 16 BRPM | HEART RATE: 87 BPM | SYSTOLIC BLOOD PRESSURE: 130 MMHG | TEMPERATURE: 95.7 F | DIASTOLIC BLOOD PRESSURE: 80 MMHG

## 2021-06-16 PROCEDURE — 3331090001 HH PPS REVENUE CREDIT

## 2021-06-16 PROCEDURE — G0157 HHC PT ASSISTANT EA 15: HCPCS

## 2021-06-16 PROCEDURE — 3331090002 HH PPS REVENUE DEBIT

## 2021-06-17 ENCOUNTER — HOME CARE VISIT (OUTPATIENT)
Dept: SCHEDULING | Facility: HOME HEALTH | Age: 56
End: 2021-06-17
Payer: OTHER GOVERNMENT

## 2021-06-17 PROCEDURE — 3331090002 HH PPS REVENUE DEBIT

## 2021-06-17 PROCEDURE — G0300 HHS/HOSPICE OF LPN EA 15 MIN: HCPCS

## 2021-06-17 PROCEDURE — 3331090001 HH PPS REVENUE CREDIT

## 2021-06-18 ENCOUNTER — HOME CARE VISIT (OUTPATIENT)
Dept: SCHEDULING | Facility: HOME HEALTH | Age: 56
End: 2021-06-18
Payer: OTHER GOVERNMENT

## 2021-06-18 VITALS
HEART RATE: 82 BPM | SYSTOLIC BLOOD PRESSURE: 125 MMHG | DIASTOLIC BLOOD PRESSURE: 65 MMHG | TEMPERATURE: 98.1 F | OXYGEN SATURATION: 98 % | RESPIRATION RATE: 17 BRPM

## 2021-06-18 PROCEDURE — 3331090002 HH PPS REVENUE DEBIT

## 2021-06-18 PROCEDURE — G0151 HHCP-SERV OF PT,EA 15 MIN: HCPCS

## 2021-06-18 PROCEDURE — 3331090001 HH PPS REVENUE CREDIT

## 2021-06-22 VITALS
DIASTOLIC BLOOD PRESSURE: 84 MMHG | OXYGEN SATURATION: 98 % | HEART RATE: 96 BPM | SYSTOLIC BLOOD PRESSURE: 122 MMHG | TEMPERATURE: 97.8 F

## 2021-07-15 ENCOUNTER — HOSPITAL ENCOUNTER (OUTPATIENT)
Dept: GENERAL RADIOLOGY | Age: 56
Discharge: HOME | End: 2021-07-15
Payer: OTHER GOVERNMENT

## 2021-07-15 DIAGNOSIS — M50.20 DISPLACEMENT OF CERVICAL INTERVERTEBRAL DISC WITHOUT MYELOPATHY: ICD-10-CM

## 2021-07-15 PROCEDURE — 72040 X-RAY EXAM NECK SPINE 2-3 VW: CPT

## 2021-09-13 ENCOUNTER — OFFICE VISIT (OUTPATIENT)
Dept: FAMILY MEDICINE CLINIC | Age: 56
End: 2021-09-13
Payer: OTHER GOVERNMENT

## 2021-09-13 ENCOUNTER — HOSPITAL ENCOUNTER (OUTPATIENT)
Dept: LAB | Age: 56
Discharge: HOME | End: 2021-09-13
Payer: OTHER GOVERNMENT

## 2021-09-13 VITALS
HEIGHT: 66 IN | TEMPERATURE: 97.3 F | WEIGHT: 172.2 LBS | OXYGEN SATURATION: 99 % | HEART RATE: 98 BPM | RESPIRATION RATE: 12 BRPM | SYSTOLIC BLOOD PRESSURE: 119 MMHG | BODY MASS INDEX: 27.68 KG/M2 | DIASTOLIC BLOOD PRESSURE: 78 MMHG

## 2021-09-13 DIAGNOSIS — E78.00 HYPERCHOLESTEROLEMIA: ICD-10-CM

## 2021-09-13 DIAGNOSIS — I10 ESSENTIAL HYPERTENSION: ICD-10-CM

## 2021-09-13 DIAGNOSIS — R73.9 ELEVATED BLOOD SUGAR: ICD-10-CM

## 2021-09-13 DIAGNOSIS — Z11.59 NEED FOR HEPATITIS C SCREENING TEST: ICD-10-CM

## 2021-09-13 DIAGNOSIS — I10 ESSENTIAL HYPERTENSION: Primary | ICD-10-CM

## 2021-09-13 LAB
ALBUMIN SERPL-MCNC: 3.9 G/DL (ref 3.4–5)
ALBUMIN/GLOB SERPL: 1 {RATIO} (ref 0.8–1.7)
ALP SERPL-CCNC: 62 U/L (ref 45–117)
ALT SERPL-CCNC: 30 U/L (ref 13–56)
ANION GAP SERPL CALC-SCNC: 7 MMOL/L (ref 3–18)
AST SERPL-CCNC: 19 U/L (ref 10–38)
BILIRUB SERPL-MCNC: 0.6 MG/DL (ref 0.2–1)
BUN SERPL-MCNC: 13 MG/DL (ref 7–18)
BUN/CREAT SERPL: 15 (ref 12–20)
CALCIUM SERPL-MCNC: 9.3 MG/DL (ref 8.5–10.1)
CHLORIDE SERPL-SCNC: 105 MMOL/L (ref 100–111)
CHOLEST SERPL-MCNC: 189 MG/DL
CO2 SERPL-SCNC: 29 MMOL/L (ref 21–32)
CREAT SERPL-MCNC: 0.84 MG/DL (ref 0.6–1.3)
EST. AVERAGE GLUCOSE BLD GHB EST-MCNC: 117 MG/DL
GLOBULIN SER CALC-MCNC: 3.8 G/DL (ref 2–4)
GLUCOSE SERPL-MCNC: 86 MG/DL (ref 74–99)
HBA1C MFR BLD: 5.7 % (ref 4.2–5.6)
HDLC SERPL-MCNC: 46 MG/DL (ref 40–60)
HDLC SERPL: 4.1 {RATIO} (ref 0–5)
LDLC SERPL CALC-MCNC: 131.8 MG/DL (ref 0–100)
LIPID PROFILE,FLP: ABNORMAL
POTASSIUM SERPL-SCNC: 3.3 MMOL/L (ref 3.5–5.5)
PROT SERPL-MCNC: 7.7 G/DL (ref 6.4–8.2)
SODIUM SERPL-SCNC: 141 MMOL/L (ref 136–145)
TRIGL SERPL-MCNC: 56 MG/DL (ref ?–150)
VLDLC SERPL CALC-MCNC: 11.2 MG/DL

## 2021-09-13 PROCEDURE — 86803 HEPATITIS C AB TEST: CPT

## 2021-09-13 PROCEDURE — 80061 LIPID PANEL: CPT

## 2021-09-13 PROCEDURE — 80053 COMPREHEN METABOLIC PANEL: CPT

## 2021-09-13 PROCEDURE — 83036 HEMOGLOBIN GLYCOSYLATED A1C: CPT

## 2021-09-13 PROCEDURE — 36415 COLL VENOUS BLD VENIPUNCTURE: CPT

## 2021-09-13 PROCEDURE — 99214 OFFICE O/P EST MOD 30 MIN: CPT | Performed by: FAMILY MEDICINE

## 2021-09-13 RX ORDER — METHOCARBAMOL 750 MG/1
TABLET, FILM COATED ORAL
COMMUNITY
Start: 2021-08-31 | End: 2022-04-12 | Stop reason: ALTCHOICE

## 2021-09-13 NOTE — PATIENT INSTRUCTIONS
Learning About Diuretics for High Blood Pressure  Overview  Diuretics help to lower blood pressure. This reduces your risk of a heart attack and stroke. It also reduces your risk of kidney disease. Diuretics cause your kidneys to remove sodium and water. They also relax the blood vessel walls. These help lower your blood pressure. Examples  · Chlorthalidone  · Hydrochlorothiazide  Possible side effects  There are some common side effects. They are:  · Too little potassium. · Feeling dizzy. · Rash. · Urinating a lot. · High blood sugar. (But this is not common.)  You may have other side effects. Check the information that comes with your medicine. What to know about taking this medicine  · You may take other medicines for blood pressure. Diuretics can help those work better. They can also prevent extra fluid in your body. · You may need to take potassium pills. Ask your doctor about this. · You may need blood tests to check on your health. For example, you may have tests to check your kidneys and your potassium level. · Take your medicines exactly as prescribed. Call your doctor if you think you are having a problem with your medicine. · Check with your doctor or pharmacist before you use any other medicines. This includes over-the-counter medicines. Make sure your doctor knows all of the medicines, vitamins, herbal products, and supplements you take. Taking some medicines together can cause problems. Where can you learn more? Go to http://www.gray.com/  Enter J070 in the search box to learn more about \"Learning About Diuretics for High Blood Pressure. \"  Current as of: August 31, 2020               Content Version: 12.8  © 2006-2021 [employer]. Care instructions adapted under license by [employer] (which disclaims liability or warranty for this information).  If you have questions about a medical condition or this instruction, always ask your healthcare professional. [address] any warranty or liability for your use of this information.

## 2021-09-13 NOTE — PROGRESS NOTES
Chief Complaint   Patient presents with    Hypertension     6m f/u        Pt preferred language for health care discussion is english. Is someone accompanying this pt? no    Is the patient using any DME equipment during OV? no    Depression Screening:  3 most recent [facility] Screens 9/13/2021 6/3/2021 3/8/2021 9/10/2019 6/6/2019 12/5/2018 9/14/2017   Little interest or pleasure in doing things Not at all Not at all Not at all Not at all Not at all Not at all Not at all   Feeling down, depressed, irritable, or hopeless Not at all Not at all Not at all Not at all Not at all Not at all Not at all   Total Score PHQ 2 0 0 0 0 0 0 0       Learning Assessment:  Learning Assessment 9/10/2019 4/2/2014 3/14/2013   PRIMARY LEARNER Patient Patient Patient   HIGHEST LEVEL OF EDUCATION - PRIMARY LEARNER  - [phone] LEARNER - NONE -   [address]  CAREGIVER - No -   [address] [facility]   LEARNER PREFERENCE PRIMARY READING READING DEMONSTRATION     - - LISTENING     - - READING   ANSWERED BY patient patient patient    [address] Maintenance reviewed and discussed per provider. Yes        Advance Directive:  1. Do you have an advance directive in place? Patient Reply:no    2. If not, would you like material regarding how to put one in place? Patient Reply: no    Coordination of Care:  1. Have you been to the ER, urgent care clinic since your last visit? Hospitalized since your last visit? no    2. Have you seen or consulted any other health care providers outside of the [address] [place] [address] since your last visit? Include any pap smears or colon screening.  no

## 2021-09-13 NOTE — PROGRESS NOTES
HPI:  Yocasta Lopez is a 49 y.o. female who presents today with   Chief Complaint   Patient presents with    Hypertension     6m f/u         HTN:  Stable on meds as listed below; On HCTZ and zocor; Pt is fasting   She has no new physical complaints. Has had neck surgery    She is in PT  Neck pain has improved. Lab Results   Component Value Date/Time    Cholesterol, total 190 03/15/2021 10:22 AM    HDL Cholesterol 41 03/15/2021 10:22 AM    LDL, calculated 140 (H) 03/15/2021 10:22 AM    VLDL, calculated 9 03/15/2021 10:22 AM    Triglyceride 45 03/15/2021 10:22 AM    CHOL/HDL Ratio 4.6 03/15/2021 10:22 AM     Lab Results   Component Value Date/Time    Sodium 140 06/07/2021 09:41 AM    Potassium 3.4 (L) 06/07/2021 09:41 AM    Chloride 107 06/07/2021 09:41 AM    CO2 27 06/07/2021 09:41 AM    Anion gap 6 06/07/2021 09:41 AM    Glucose 111 (H) 06/07/2021 09:41 AM    BUN 11 06/07/2021 09:41 AM    Creatinine 0.75 06/07/2021 09:41 AM    BUN/Creatinine ratio 15 06/07/2021 09:41 AM    GFR est AA >60 06/07/2021 09:41 AM    GFR est non-AA >60 06/07/2021 09:41 AM    Calcium 9.1 06/07/2021 09:41 AM       Lab Results   Component Value Date/Time    Hemoglobin A1c 5.6 03/15/2021 10:22 AM               3 most recent PHQ Screens 9/13/2021   Little interest or pleasure in doing things Not at all   Feeling down, depressed, irritable, or hopeless Not at all   Total Score PHQ 2 0               PMH,  Meds, Allergies, Family History, Social history reviewed      Current Outpatient Medications   Medication Sig Dispense Refill    methocarbamoL (ROBAXIN) 750 mg tablet take 1 tablet by mouth three times a day if needed for muscle spasm      hydroCHLOROthiazide (HYDRODIURIL) 25 mg tablet Take 1 Tab by mouth daily. 90 Tab 3    simvastatin (ZOCOR) 40 mg tablet Take 1 Tab by mouth nightly. 90 Tab 3    medroxyprogesterone (DEPO-PROVERA) 150 mg/mL Syrg 150 mg by IntraMUSCular route every three (3) months.       MULTIVITAMINS (MULTI-VITAMIN PO) Take 1 Tab by mouth daily. No Known Allergies               ROS  As per HPI    Visit Vitals  /78 (BP 1 Location: Right arm, BP Patient Position: Sitting, BP Cuff Size: Large adult)   Pulse 98   Temp 97.3 °F (36.3 °C) (Temporal)   Resp 12   Ht 5' 6\" (1.676 m)   Wt 172 lb 3.2 oz (78.1 kg)   SpO2 99%   BMI 27.79 kg/m²     Physical Exam   General appearance: alert, cooperative, no distress, appears stated age  Neck: supple, symmetrical, trachea midline, no adenopathy, thyroid: not enlarged, symmetric, no tenderness/mass/nodules, no carotid bruit and no JVD  Lungs: clear to auscultation bilaterally  Heart: regular rate and rhythm, S1, S2 normal, no murmur, click, rub or gallop    Extremities: extremities normal, atraumatic, no cyanosis or edema    Lab Results   Component Value Date/Time    Cholesterol, total 190 03/15/2021 10:22 AM    HDL Cholesterol 41 03/15/2021 10:22 AM    LDL, calculated 140 (H) 03/15/2021 10:22 AM    VLDL, calculated 9 03/15/2021 10:22 AM    Triglyceride 45 03/15/2021 10:22 AM    CHOL/HDL Ratio 4.6 03/15/2021 10:22 AM     Lab Results   Component Value Date/Time    Hemoglobin A1c 5.6 03/15/2021 10:22 AM         Assessment/Plan:    Diagnoses and all orders for this visit:    1. Essential hypertension  -     METABOLIC PANEL, COMPREHENSIVE; Future  -     METABOLIC PANEL, COMPREHENSIVE; Future    2. Elevated blood sugar  -     HEMOGLOBIN A1C WITH EAG; Future  -     HEMOGLOBIN A1C WITH EAG; Future    3. Hypercholesterolemia  -     LIPID PANEL; Future  -     LIPID PANEL; Future    4. Need for hepatitis C screening test  -     HEPATITIS C AB;  Future      As above    above all stable unless otherwise noted   treatment plan as listed below  Orders Placed This Encounter    LIPID PANEL    METABOLIC PANEL, COMPREHENSIVE    HEMOGLOBIN A1C WITH EAG    LIPID PANEL    METABOLIC PANEL, COMPREHENSIVE    HEMOGLOBIN A1C WITH EAG    HEPATITIS C AB    methocarbamoL (ROBAXIN) 750 mg tablet- med rec     Follow-up and Dispositions    · Return in about 6 months (around 3/13/2022) for well exam.       This has been fully explained to the patient, who indicates understanding. An After Visit Summary was printed and given to the patient.              Juan Nieto MD

## 2021-09-14 ENCOUNTER — HOSPITAL ENCOUNTER (OUTPATIENT)
Dept: PHYSICAL THERAPY | Age: 56
Discharge: HOME | End: 2021-09-14
Payer: OTHER GOVERNMENT

## 2021-09-14 PROCEDURE — 97140 MANUAL THERAPY 1/> REGIONS: CPT

## 2021-09-14 PROCEDURE — 97162 PT EVAL MOD COMPLEX 30 MIN: CPT

## 2021-09-14 PROCEDURE — 97110 THERAPEUTIC EXERCISES: CPT

## 2021-09-14 NOTE — PROGRESS NOTES
PT DAILY TREATMENT NOTE/CERVICAL [id number]    Patient Name: David Kat  Date:2021  : 1965  [x]  Patient  Verified  Payor:  / Plan: [facility] / Product Type:  /    In time: 2:18  Out time:3:05   Total Treatment Time (min): 42  Visit #: 1 of 8    Treatment Area: Cervicalgia [M54.2]  Status post cervical spinal fusion [Z98.1]    SUBJECTIVE  Pain Level (0-10 scale): 3/10   []constant []intermittent []improving []worsening []no change since onset    Any medication changes, allergies to medications, adverse drug reactions, diagnosis change, or new procedure performed?: [x] No    [] Yes (see summary sheet for update)  Subjective functional status/changes:     PLOF: Patient is right hand dominant. Patient reports prior to onset of neck pain in 2019 she did not have any functional deficits in regards to cervical spine. Limitations to PLOF: pain, limited mobility, decreased strength   Mechanism of Injury: Patient reports cervical pain s/p cervical fusion on 21. Patient denies any post-op complications. Current symptoms/Complaints: Patient describes cervical symptoms as intermittent aching/stiffness. Patient denies numbness/tingling and stated that went away following the surgery. Patient's surgeon was Dr. Michelle Luz. Patient has increased difficulty with prolonged sitting at a desk for work, and turning her head. Patient reports average pain level is a 4/10. Patient reported no continued restrictions from MD since last follow up at 12 weeks post-op. Previous Treatment/Compliance: Prior PT in 2019 for neck. PMHx/Surgical Hx: No previous shoulder/UE surg. Low Back surg- ; no pacemaker, no cardiac issues,  no cancer  Work Hx: currently works for [employer] as an . patient works full time. Work duties are primarily at [employer]. Patient reports pain level is a 5/10 by end of work day.       Pt Goals: \"less pain and stiffness\" Cognition: A & O x 2    Other:    OBJECTIVE/EXAMINATION  Self Care: independent with bathing and dressing. 17 min [x]Eval                  []Re-Eval       15 min Therapeutic Exercise:  [x] See flow sheet : HEP creation and review    Rationale: increase ROM and increase strength to improve the patients ability to perform ADls safely. 10 min Manual Therapy: Patient in supine and sidelying: STM to (B) UT/levator scap and along vertebral border of scap; Gentle scap mobs    The manual therapy interventions were performed at a separate and distinct time from the therapeutic activities interventions. Rationale: decrease pain, increase ROM and increase tissue extensibility to increase ease with ADLs. With   [] TE   [] TA   [] neuro   [] other: Patient Education: [x] Review HEP    [] Progressed/Changed HEP based on:   [] positioning   [] body mechanics   [] transfers   [] heat/ice application    [] other:      Other Objective/Functional Measures:     Physical Therapy Evaluation Cervical Spine     Headaches: Do you have headaches? [] Yes   [x] No  How often do you get headaches? Approximately 3x/week   How long does the headache last? Couple hours- but varies   What aggravates it? Neck gets tired and stiffens up   What relieves it? Resting   Does the headache coincide with any other symptoms (visual disturbances, light sensitivity)? none  Where is the headache? forehead  Does it change locations?  The same   Other:    OBJECTIVE  Posture: [] WNL  Head Position:forward head   Shoulder/Scapular Position: rounded     TMJ: [x] N/A [] Abnormal - ROM:   Palpation:    Cervical chin tuck: [] WNL    [x] Abnormal: very little motion noted with supine chin tuck     Shoulder/Scapular Screen: [] WNL    [] Abnormal:  Shoulder AROM (B) flex: 135deg abduction: 135deg     Active Movements: [] N/A   [] Too acute   [] Other:  ROM % AROM % PROM Comments:pain, area   Forward flexion 20  1-2/10 pain level in all planes except no pain into extension . Extension 28deg     SB right 15     SB left  15     Rotation right 35     Rotation left 35       Palpation:  [] Min  [x] Mod  [] Severe    Location: tightness/tenderness at (B) UT/levator scap/and along thoracic paraspinals and scapular musculature. Muscle Flexibility: [] N/A      Upper Trap: [] WNL    [x] Tight    [x] R    [x] L   Levator: [] WNL    [x] Tight    [x] R    [x] L   Pect. Minor: [] WNL    [x] Tight    [x] R    [x] L    Global Muscular Weakness: [x] N/A   Lower Trap:   Rhomboids:   Middle Trap:   Serratus Ant:   Ext Rotators: Other:    Other tests/comments:   Limited scapular mobility (B)     Pain Level (0-10 scale) post treatment: 2/10     ASSESSMENT/Changes in Function: See POC. Patient reported feeling better after the manual and not as tight. Advised patient to perform HEP gently and to stop if pain increases. Patient will continue to benefit from skilled PT services to modify and progress therapeutic interventions, address functional mobility deficits, address ROM deficits, address strength deficits, analyze and address soft tissue restrictions, analyze and cue movement patterns, analyze and modify body mechanics/ergonomics and assess and modify postural abnormalities to attain remaining goals.      [x]  See Plan of Care  []  See progress note/recertification  []  See Discharge Summary         Progress towards goals / Updated goals:  See POC     PLAN  []  Upgrade activities as tolerated     [x]  Continue plan of care  []  Update interventions per flow sheet       []  Discharge due to:_  []  Other:_      Milagro Liao, PT 9/14/2021  2:15 PM

## 2021-09-14 NOTE — PROGRESS NOTES
[facility] [address], [address] [place] [address], [address] [place], [place] [address]y [address]  [phone] fax      Plan of Care/ Statement of Necessity for Physical Therapy Services    Patient name: Violeta Dang Start of Care: 2021   Referral source: Gudelia Humphries : 1965    Medical Diagnosis: Cervicalgia [M54.2]  Status post cervical spinal fusion [Z98.1]  Payor: MATTHEW / Plan: Cornelius Mock 74 / Product Type: Suhas Yo /  Onset Date: 21    Treatment Diagnosis: cervical pain    Prior Hospitalization: see medical history Provider#: [id number]   Medications: Verified on Patient summary List    Comorbidities: back pain, HTN, thyroid problems,  Low Back surg-    Prior Level of Function: Patient is right hand dominant. Patient reports prior to onset of neck pain in  she did not have any functional deficits in regards to cervical spine. The Plan of Care and following information is based on the information from the initial evaluation. Assessment/ key information: Patient presents with cervical pain s/p cervical fusion on 21. Patient denies any post-op complications. Patient describes cervical symptoms as intermittent aching/stiffness. Patient denies numbness/tingling and stated that went away following the surgery. Patient has increased difficulty with prolonged sitting at a desk for work, and turning her head. Patient reports average pain level is a 4/10. Patient also reports frequent headaches (3x/week approximately) that are located at forehead region. Patient stated headaches typically begin when her neck gets tired and stiffens up. Patient exhibits decreased cervical AROM with report of discomfort, decreased (B) shoulder AROM into flexion and scaption, postural dysfunction, and increased tenderness/tightness at (B) UT/levator scap and (B) thoracic paraspinals.  Patient demonstrates potential to make functional gains within a reasonable time frame. Patient would benefit from skilled PT to address above deficits and assist with return to PLOF. Evaluation Complexity History MEDIUM  Complexity : 1-2 comorbidities / personal factors will impact the outcome/ POC ; Examination MEDIUM Complexity : 3 Standardized tests and measures addressing body structure, function, activity limitation and / or participation in recreation  ;Presentation MEDIUM Complexity : Evolving with changing characteristics  ; Clinical Decision Making MEDIUM Complexity : FOTO score of 26-74  Overall Complexity Rating: MEDIUM  Problem List: pain affecting function, decrease ROM, decrease strength, decrease ADL/ functional abilitiies, decrease activity tolerance, decrease flexibility/ joint mobility and decrease transfer abilities   Treatment Plan may include any combination of the following: Therapeutic exercise, Therapeutic activities, Neuromuscular re-education, Physical agent/modality, Manual therapy, Patient education, Self Care training and Functional mobility training  Patient / Family readiness to learn indicated by: asking questions, trying to perform skills and interest  Persons(s) to be included in education: patient (P)  Barriers to Learning/Limitations: None  Patient Goal (s): less pain and stiffness\"   Patient Self Reported Health Status: good  Rehabilitation Potential: good    Short Term Goals: To be accomplished in 2 weeks:   1. Patient will be independent and compliant with HEP 1-2x/day to increase ease with ADLs. Eval: HEP established   2. Patient will improve (B) shoulder AROM flexion and scaption to 145deg to increase ease with overhead activities. Eval: (B) shoulder AROM flex: 135deg, Abduction: 135deg  Long Term Goals: To be accomplished in 4 weeks:   1. Patient will improve FOTO to at least 62 to demonstrate improved function. Eval: FOTO: 53   2. Patient will improve cervical AROM rotation to 50deg to increase ease with driving.    Eval: Cervical AROM rotation: (B) 35deg   3. Patient will report being able to work a full day with pain no more then 1-2/10 to increase ease with work duties. Eval: patient reports 5/10 pain level after working a full day   4. Patient will report headaches will decreased to no more then 1x/week to assist with return to PLOF. Eval: Patient reports headaches occur approximately 3x/week. Frequency / Duration: Patient to be seen 2 times per week for 4 weeks. Patient/ Caregiver education and instruction: Diagnosis, prognosis, exercises   [x]  Plan of care has been reviewed with JADEN Brasher, PT 9/14/2021 3:59 PM  ________________________________________________________________________    I certify that the above Therapy Services are being furnished while the patient is under my care. I agree with the treatment plan and certify that this therapy is necessary.     Physician's Signature:____________Date:_________TIME:________     Melyssa Hinojosa PA-C  ** Signature, Date and Time must be completed for valid certification **      Please sign and return to In . [address], [address] [place] [address], [address] [place] [address],[facility] [address]  [phone] fax

## 2021-09-15 LAB
HCV AB SER IA-ACNC: 0.04 INDEX
HCV AB SERPL QL IA: NEGATIVE
HCV COMMENT,HCGAC: NORMAL

## 2021-09-17 ENCOUNTER — APPOINTMENT (OUTPATIENT)
Dept: PHYSICAL THERAPY | Age: 56
End: 2021-09-17
Payer: OTHER GOVERNMENT

## 2021-09-21 ENCOUNTER — HOSPITAL ENCOUNTER (OUTPATIENT)
Dept: PHYSICAL THERAPY | Age: 56
Discharge: HOME | End: 2021-09-21
Payer: OTHER GOVERNMENT

## 2021-09-21 PROCEDURE — 97140 MANUAL THERAPY 1/> REGIONS: CPT

## 2021-09-21 PROCEDURE — 97110 THERAPEUTIC EXERCISES: CPT

## 2021-09-21 NOTE — PROGRESS NOTES
PT DAILY TREATMENT NOTE     Patient Name: Beryle Rea  Date:2021  : 1965  [x]  Patient  Verified  Payor: MATTHEW / Plan: Cornelius Mock 74 / Product Type:  /    In time: 5:17  Out time: 6:05  Total Treatment Time (min): 48  Visit #: 2 of 8    Treatment Area: Cervicalgia [M54.2]  Status post cervical spinal fusion [Z98.1]    SUBJECTIVE  Pain Level (0-10 scale): 4-5/10  Any medication changes, allergies to medications, adverse drug reactions, diagnosis change, or new procedure performed?: [x] No    [] Yes (see summary sheet for update)  Subjective functional status/changes:   [] No changes reported  Patient reports that since her initial evaluation, she has continued to be limited by neck stiffness and pain.      OBJECTIVE    Modality rationale: decrease pain and increase tissue extensibility to improve the patients ability to perform daily activities without being limited by neck pain or stiffness   Min Type Additional Details    [] Estim:  []Unatt       []IFC  []Premod                        []Other:  []w/ice   []w/heat  Position:  Location:    [] Estim: []Att    []TENS instruct  []NMES                    []Other:  []w/US   []w/ice   []w/heat  Position:  Location:    []  Traction: [] Cervical       []Lumbar                       [] Prone          []Supine                       []Intermittent   []Continuous Lbs:  [] before manual  [] after manual    []  Ultrasound: []Continuous   [] Pulsed                           []1MHz   []3MHz W/cm2:  Location:    []  Iontophoresis with dexamethasone         Location: [] Take home patch   [] In clinic   10 []  Ice     [x]  heat post  []  Ice massage  []  Laser   []  Anodyne Position: supine  Location: cervical spine    []  Laser with stim  []  Other:  Position:  Location:    []  Vasopneumatic Device    []  Right     []  Left  Pre-treatment girth:  Post-treatment girth:  Measured at (location):  Pressure:       [] lo [] med [] hi Temperature: [] lo [] med [] hi   [] Skin assessment post-treatment:  []intact []redness- no adverse reaction    []redness  adverse reaction:     25 min Therapeutic Exercise:  [x] See flow sheet :   Rationale: increase ROM and increase strength to improve the patients ability to perform ADLs without limitation from neck pain, stiffness, or muscle weakness    13 min Manual Therapy:  DTM/TPR to bilateral upper traps, rhomboids, suboccipitals with patient in supine   The manual therapy interventions were performed at a separate and distinct time from the therapeutic activities interventions. Rationale: decrease pain, increase ROM, increase tissue extensibility and decrease trigger points to improve patient's ability to perform ADLs without limitation from cervical spine/neck pain or stiffness          With   [x] TE   [] TA   [] neuro   [] other: Patient Education: [x] Review HEP    [] Progressed/Changed HEP based on:   [] positioning   [] body mechanics   [] transfers   [] heat/ice application    [] other:      Other Objective/Functional Measures: Therapist noted increased muscle tension through patient's bilateral upper traps which improved with manual therapy interventions      Pain Level (0-10 scale) post treatment: 2/10    ASSESSMENT/Changes in Function: Patient participated well throughout session, however, continues to report limitation from neck stiffness. Patient reported improved symptoms following exercises and manual therapy interventions today. Patient will continue to benefit from skilled PT services to modify and progress therapeutic interventions, address functional mobility deficits, address ROM deficits, address strength deficits, analyze and address soft tissue restrictions, analyze and cue movement patterns, analyze and modify body mechanics/ergonomics, assess and modify postural abnormalities and instruct in home and community integration to attain remaining goals.      [x]  See Plan of Care  []  See progress note/recertification  []  See Discharge Summary         Progress towards goals / Updated goals:  1. Patient will be independent and compliant with HEP 1-2x/day to increase ease with ADLs. Eval: HEP established   Current: progressing  2. Patient will improve (B) shoulder AROM flexion and scaption to 145deg to increase ease with overhead activities. Eval: (B) shoulder AROM flex: 135deg, Abduction: 135deg   Current: will assess at next visit - progressing  Long Term Goals: To be accomplished in 4 weeks:  1. Patient will improve FOTO to at least 62 to demonstrate improved function. Eval: FOTO: 53   Current: ongoing and N/A 9/21/21 - progressing  2. Patient will improve cervical AROM rotation to 50deg to increase ease with driving. Eval: Cervical AROM rotation: (B) 35deg    Current: ongoing and N/A 9/21/21 - progressing  3. Patient will report being able to work a full day with pain no more then 1-2/10 to increase ease with work duties. Eval: patient reports 5/10 pain level after working a full day   Current: ongoing and N/A 9/21/21 - progressing  4. Patient will report headaches will decreased to no more then 1x/week to assist with return to PLOF. Eval: Patient reports headaches occur approximately 3x/week.     Current: patient continues to endorse headaches occurring throughout the week - progressing    PLAN  [x]  Upgrade activities as tolerated     [x]  Continue plan of care  [x]  Update interventions per flow sheet       []  Discharge due to:_  []  Other:_      Andres Kruger, SHAHID 9/21/2021  6:08 PM    Future Appointments   Date Time Provider Winter Arguello   9/23/2021  4:30 PM Vignesh Smallwood PTA [facility]   9/28/2021  5:15 PM Hollice Collet, PT [facility]   9/30/2021  4:30 PM Vignesh Smallwood PTA [facility]   10/5/2021  5:15 PM Hollice Collet, PT [facility]   10/7/2021  5:15 PM Ave Barragan PTA [facility]   10/12/2021  6:00 PM Iam Kee [facility]   3/14/2022  9:00 AM Patrick Gore Mt, MD [facility]

## 2021-09-23 ENCOUNTER — HOSPITAL ENCOUNTER (OUTPATIENT)
Dept: PHYSICAL THERAPY | Age: 56
Discharge: HOME | End: 2021-09-23
Payer: OTHER GOVERNMENT

## 2021-09-23 PROCEDURE — 97140 MANUAL THERAPY 1/> REGIONS: CPT

## 2021-09-23 PROCEDURE — 97110 THERAPEUTIC EXERCISES: CPT

## 2021-09-23 NOTE — PROGRESS NOTES
PT DAILY TREATMENT NOTE     Patient Name: Beryle Rea  Date:2021  : 1965  [x]  Patient  Verified  Payor: MATTHEW / Plan: Cornelius Mock 74 / Product Type:  /    In time::23  Out time:5:10  Total Treatment Time (min): 52  Visit #: 3 of 8    Medicare/BCBS Only   Total Timed Codes (min):   1:1 Treatment Time:        Treatment Area: Cervicalgia [M54.2]  Status post cervical spinal fusion [Z98.1]    SUBJECTIVE  Pain Level (0-10 scale): 3  Any medication changes, allergies to medications, adverse drug reactions, diagnosis change, or new procedure performed?: [x] No    [] Yes (see summary sheet for update)  Subjective functional status/changes:   [] No changes reported  \"Little pain. \"    OBJECTIVE    Modality rationale: decrease edema, decrease inflammation, decrease pain, increase tissue extensibility and increase muscle contraction/control to improve the patients ability to perform ADL    Min Type Additional Details    [] Estim:  []Unatt       []IFC  []Premod                        []Other:  []w/ice   []w/heat  Position:  Location:    [] Estim: []Att    []TENS instruct  []NMES                    []Other:  []w/US   []w/ice   []w/heat  Position:  Location:    []  Traction: [] Cervical       []Lumbar                       [] Prone          []Supine                       []Intermittent   []Continuous Lbs:  [] before manual  [] after manual    []  Ultrasound: []Continuous   [] Pulsed                           []1MHz   []3MHz W/cm2:  Location:    []  Iontophoresis with dexamethasone         Location: [] Take home patch   [] In clinic   10 []  Ice     [x]  heat  []  Ice massage  []  Laser   []  Anodyne Position:prone  Location:(B) Upper/middle traps    []  Laser with stim  []  Other:  Position:  Location:    []  Vasopneumatic Device    []  Right     []  Left  Pre-treatment girth:  Post-treatment girth:  Measured at (location):  Pressure:       [] lo [] med [] hi   Temperature: [] lo [] med [] hi   [x] Skin assessment post-treatment:  [x]intact []redness- no adverse reaction    []redness  adverse reaction:      min []Eval                  []Re-Eval       24 min Therapeutic Exercise:  [x] See flow sheet :   Rationale: increase ROM, increase strength and improve coordination to improve the patients ability to perform ADL      min Therapeutic Activity:  [x]  See flow sheet :   Rationale: increase ROM, increase strength and improve coordination  to improve the patients ability to perform ADL       min Neuromuscular Re-education:  []  See flow sheet :   Rationale: increase ROM, increase strength, improve coordination, improve balance and increase proprioception  to improve the patients ability to perform ADL     13 min Manual Therapy:  STM/DTM  (B) uper and middle trap/paraspinals prone   The manual therapy interventions were performed at a separate and distinct time from the therapeutic activities interventions. Rationale: decrease pain, increase ROM, increase tissue extensibility, decrease edema , decrease trigger points and increase postural awareness to perform ADL      min Gait Training:  ___ feet with ___ device on level surfaces with ___ level of assist   Rationale: With   [x] TE   [] TA   [] neuro   [] other: Patient Education: [x] Review HEP    [] Progressed/Changed HEP based on:   [] positioning   [] body mechanics   [] transfers   [] heat/ice application    [] other:      Other Objective/Functional Measures: Added UBE  Retro 5 min/ increased rep per flow sheet    Pain Level (0-10 scale) post treatment:0    ASSESSMENT/Changes in Function: Pt presents with (B) upper/middle trap tightness right>left during manual. Pt required cues on performing S/L shoulder ER /ABD  Correctly. Overall pt completed each there ex  Fairly well. Followng manual pt presents with improve in ROM at CSP& upper thoracic region.     Patient will continue to benefit from skilled PT services to address functional mobility deficits, address ROM deficits, address strength deficits, analyze and address soft tissue restrictions, analyze and cue movement patterns, analyze and modify body mechanics/ergonomics, assess and modify postural abnormalities and instruct in home and community integration to attain remaining goals. [x]  See Plan of Care  []  See progress note/recertification  []  See Discharge Summary         Progress towards goals / Updated goals:  1. Patient will be independent and compliant with HEP 1-2x/day to increase ease with ADLs.              Eval: HEP established              Current: progressing  2. Patient will improve (B) shoulder AROM flexion and scaption to 145deg to increase ease with overhead activities.              Eval: (B) shoulder AROM flex: 135deg, Abduction: 135deg              Current: will assess at next visit - progressing  Long Term Goals: To be accomplished in 4 weeks:  1. Patient will improve FOTO to at least 62 to demonstrate improved function.               Eval: FOTO: 53              Current: ongoing and N/A 9/21/21 - progressing  2. Patient will improve cervical AROM rotation to 50deg to increase ease with driving.               Eval: Cervical AROM rotation: (B) 35deg               Current: ongoing and N/A 9/21/21 - progressing  3. Patient will report being able to work a full day with pain no more then 1-2/10 to increase ease with work duties.              Eval: patient reports 5/10 pain level after working a full day              Current: ongoing and N/A 9/21/21 - progressing  4. Patient will report headaches will decreased to no more then 1x/week to assist with return to PLOF.              Eval: Patient reports headaches occur approximately 3x/week.                Current: patient continues to endorse headaches occurring throughout the week - progressing    PLAN  [x]  Upgrade activities as tolerated     []  Continue plan of care  []  Update interventions per flow sheet []  Discharge due to:_  [x]  Other:_REASSESS ROM NV      Cristy Maryan, PTA 9/23/2021  4:25 PM    Future Appointments   Date Time Provider Winter Arguello   9/23/2021  4:30 PM Chris Hooks [facility]   9/28/2021  5:15 PM Dasha Ore, PT [facility]   9/30/2021  4:30 PM Nidia Gamble, JADEN [facility]   10/5/2021  5:15 PM Dasha Rahul, PT [facility]   10/7/2021  5:15 PM Nidia Gabmle, JADEN [facility]   10/12/2021  6:00 PM Iam Iqbal [facility]   3/14/2022  9:00 AM Caden Gore MD [facility]

## 2021-09-23 NOTE — PROGRESS NOTES
PT DAILY TREATMENT NOTE     Patient Name: Sania Raya  Date:2021  : 1965  [x]  Patient  Verified  Payor: MATTHEW / Plan: Cornelius Mock 76 / Product Type:  /    In time::23  Out time:5:10  Total Treatment Time (min): 52  Visit #: 3 of 8    Medicare/BCBS Only   Total Timed Codes (min):   1:1 Treatment Time:        Treatment Area: Cervicalgia [M54.2]  Status post cervical spinal fusion [Z98.1]    SUBJECTIVE  Pain Level (0-10 scale): 3  Any medication changes, allergies to medications, adverse drug reactions, diagnosis change, or new procedure performed?: [x] No    [] Yes (see summary sheet for update)  Subjective functional status/changes:   [] No changes reported  \"Little pain. \"    OBJECTIVE    Modality rationale: decrease edema, decrease inflammation, decrease pain, increase tissue extensibility and increase muscle contraction/control to improve the patients ability to perform ADL    Min Type Additional Details    [] Estim:  []Unatt       []IFC  []Premod                        []Other:  []w/ice   []w/heat  Position:  Location:    [] Estim: []Att    []TENS instruct  []NMES                    []Other:  []w/US   []w/ice   []w/heat  Position:  Location:    []  Traction: [] Cervical       []Lumbar                       [] Prone          []Supine                       []Intermittent   []Continuous Lbs:  [] before manual  [] after manual    []  Ultrasound: []Continuous   [] Pulsed                           []1MHz   []3MHz W/cm2:  Location:    []  Iontophoresis with dexamethasone         Location: [] Take home patch   [] In clinic   10 []  Ice     [x]  heat  []  Ice massage  []  Laser   []  Anodyne Position:prone  Location:(B) Upper/middle traps    []  Laser with stim  []  Other:  Position:  Location:    []  Vasopneumatic Device    []  Right     []  Left  Pre-treatment girth:  Post-treatment girth:  Measured at (location):  Pressure:       [] lo [] med [] hi   Temperature: [] lo [] med [] hi   [x] Skin assessment post-treatment:  [x]intact []redness- no adverse reaction    []redness  adverse reaction:      min []Eval                  []Re-Eval       24 min Therapeutic Exercise:  [x] See flow sheet :   Rationale: increase ROM, increase strength and improve coordination to improve the patients ability to perform ADL      min Therapeutic Activity:  [x]  See flow sheet :   Rationale: increase ROM, increase strength and improve coordination  to improve the patients ability to perform ADL       min Neuromuscular Re-education:  []  See flow sheet :   Rationale: increase ROM, increase strength, improve coordination, improve balance and increase proprioception  to improve the patients ability to perform ADL     13 min Manual Therapy:  STM/DTM  (B) uper and middle trap/paraspinals prone   The manual therapy interventions were performed at a separate and distinct time from the therapeutic activities interventions. Rationale: decrease pain, increase ROM, increase tissue extensibility, decrease edema , decrease trigger points and increase postural awareness to perform ADL      min Gait Training:  ___ feet with ___ device on level surfaces with ___ level of assist   Rationale: With   [x] TE   [] TA   [] neuro   [] other: Patient Education: [x] Review HEP    [] Progressed/Changed HEP based on:   [] positioning   [] body mechanics   [] transfers   [] heat/ice application    [] other:      Other Objective/Functional Measures: Added UBE  Retro 5 min/ increased rep per flow sheet    Pain Level (0-10 scale) post treatment:0    ASSESSMENT/Changes in Function: Pt presents with (B) upper/middle trap tightness right>left during manual. Pt required cues on performing S/L shoulder ER /ABD. Followng manual pt presents with improve in ROM at CSP& upper thoracic region.     Patient will continue to benefit from skilled PT services to address functional mobility deficits, address ROM deficits, address strength deficits, analyze and address soft tissue restrictions, analyze and cue movement patterns, analyze and modify body mechanics/ergonomics, assess and modify postural abnormalities and instruct in home and community integration to attain remaining goals. [x]  See Plan of Care  []  See progress note/recertification  []  See Discharge Summary         Progress towards goals / Updated goals:  1. Patient will be independent and compliant with HEP 1-2x/day to increase ease with ADLs.              Eval: HEP established              Current: progressing  2. Patient will improve (B) shoulder AROM flexion and scaption to 145deg to increase ease with overhead activities.              Eval: (B) shoulder AROM flex: 135deg, Abduction: 135deg              Current: will assess at next visit - progressing  Long Term Goals: To be accomplished in 4 weeks:  1. Patient will improve FOTO to at least 62 to demonstrate improved function.               Eval: FOTO: 53              Current: ongoing and N/A 9/21/21 - progressing  2. Patient will improve cervical AROM rotation to 50deg to increase ease with driving.               Eval: Cervical AROM rotation: (B) 35deg               Current: ongoing and N/A 9/21/21 - progressing  3. Patient will report being able to work a full day with pain no more then 1-2/10 to increase ease with work duties.              Eval: patient reports 5/10 pain level after working a full day              Current: ongoing and N/A 9/21/21 - progressing  4. Patient will report headaches will decreased to no more then 1x/week to assist with return to PLOF.              Eval: Patient reports headaches occur approximately 3x/week.                Current: patient continues to endorse headaches occurring throughout the week - progressing    PLAN  [x]  Upgrade activities as tolerated     []  Continue plan of care  []  Update interventions per flow sheet       []  Discharge due to:_  [x]  Other:_REASSESS ROM NV Cristy Aguero, JADEN 9/23/2021  4:25 PM    Future Appointments   Date Time Provider Winter Arguello   9/23/2021  4:30 PM Chris Salas [facility]   9/28/2021  5:15 PM Miki Mckenzie, SHAHID [facility]   9/30/2021  4:30 PM Trell Baig PTA [facility]   10/5/2021  5:15 PM Miki Mckenzie, SHAHID [facility]   10/7/2021  5:15 PM Trell Baig PTA [facility]   10/12/2021  6:00 PM Iam Kee [facility]   3/14/2022  9:00 AM Tracey Gore MD [facility]

## 2021-09-28 ENCOUNTER — HOSPITAL ENCOUNTER (OUTPATIENT)
Dept: PHYSICAL THERAPY | Age: 56
Discharge: HOME | End: 2021-09-28
Payer: OTHER GOVERNMENT

## 2021-09-28 PROCEDURE — 97112 NEUROMUSCULAR REEDUCATION: CPT

## 2021-09-28 PROCEDURE — 97110 THERAPEUTIC EXERCISES: CPT

## 2021-09-28 PROCEDURE — 97140 MANUAL THERAPY 1/> REGIONS: CPT

## 2021-09-28 NOTE — PROGRESS NOTES
PT DAILY TREATMENT NOTE     Patient Name: Araceli Raman  Date:2021  : 1965  [x]  Patient  Verified  Payor: MATTHEW / Plan: Cornelius Mock 76 / Product Type:  /    In time: 5:10  Out time: 6:05  Total Treatment Time (min): 55  Visit #: 4 of 8    Treatment Area: Cervicalgia [M54.2]  Status post cervical spinal fusion [Z98.1]    SUBJECTIVE  Pain Level (0-10 scale): 3/10  Any medication changes, allergies to medications, adverse drug reactions, diagnosis change, or new procedure performed?: [x] No    [] Yes (see summary sheet for update)  Subjective functional status/changes:   [] No changes reported  Patient continues to endorse stiffness through her shoulders and neck. Patient has been compliant with HEP.      OBJECTIVE    Modality rationale: decrease pain and increase tissue extensibility to improve the patients ability to perform daily activities without limitation from neck pain or stiffness   Min Type Additional Details    [] Estim:  []Unatt       []IFC  []Premod                        []Other:  []w/ice   []w/heat  Position:  Location:    [] Estim: []Att    []TENS instruct  []NMES                    []Other:  []w/US   []w/ice   []w/heat  Position:  Location:    []  Traction: [] Cervical       []Lumbar                       [] Prone          []Supine                       []Intermittent   []Continuous Lbs:  [] before manual  [] after manual    []  Ultrasound: []Continuous   [] Pulsed                           []1MHz   []3MHz W/cm2:  Location:    []  Iontophoresis with dexamethasone         Location: [] Take home patch   [] In clinic   10 []  Ice     [x]  Heat post  []  Ice massage  []  Laser   []  Anodyne Position: reclined  Location: bilateral upper traps, cervical spine    []  Laser with stim  []  Other:  Position:  Location:    []  Vasopneumatic Device    []  Right     []  Left  Pre-treatment girth:  Post-treatment girth:  Measured at (location):  Pressure:       [] lo [] med [] hi   Temperature: [] lo [] med [] hi   [] Skin assessment post-treatment:  []intact []redness- no adverse reaction    []redness  adverse reaction:     25 min Therapeutic Exercise:  [x] See flow sheet :   Rationale: increase ROM and increase strength to improve the patients ability to perform daily activities without limitation from neck pain, stiffness, or muscle weakness     10 min Neuromuscular Re-education:  [x]  See flow sheet :   Rationale: increase strength and improve coordination  to improve the patients ability to perform daily activities with improved postural awareness and without limitation from neck pain    10 min Manual Therapy:  STM/DTM/TPR to bilateral upper traps and paraspinals with patient positioned in supine   The manual therapy interventions were performed at a separate and distinct time from the therapeutic activities interventions. Rationale: decrease pain, increase ROM, increase tissue extensibility and decrease trigger points to improve the patient's ability to perform ADLs without limitation from neck pain or stiffness          With   [x] TE   [] TA   [x] neuro   [] other: Patient Education: [x] Review HEP    [] Progressed/Changed HEP based on:   [] positioning   [] body mechanics   [] transfers   [] heat/ice application    [] other:      Other Objective/Functional Measures: Added cervical snags - extension and bilateral rotation   Added rows and extensions at [facility] with emphasis on postural control      Pain Level (0-10 scale) post treatment: 2/10    ASSESSMENT/Changes in Function: Patient tolerated exercise progressions listed above very well, without an increase in pain. Patient is compliant with HEP and is progressing well towards all goals.      Patient will continue to benefit from skilled PT services to modify and progress therapeutic interventions, address functional mobility deficits, address ROM deficits, address strength deficits, analyze and address soft tissue restrictions, analyze and cue movement patterns, analyze and modify body mechanics/ergonomics, assess and modify postural abnormalities and instruct in home and community integration to attain remaining goals. [x]  See Plan of Care  []  See progress note/recertification  []  See Discharge Summary         Progress towards goals / Updated goals:  1. Patient will be independent and compliant with HEP 1-2x/day to increase ease with ADLs.              Eval: HEP established              [id number]: I and compliant with HEP - met  2. Patient will improve (B) shoulder AROM flexion and scaption to 145deg to increase ease with overhead activities.              Eval: (B) shoulder AROM flex: 135deg, Abduction: 135deg              [id number]: will assess at next visit - progressing  Long Term Goals: To be accomplished in 4 weeks:  1. Patient will improve FOTO to at least 62 to demonstrate improved function.               Eval: FOTO: 53              Current: ongoing and N/A 9/28/21 - progressing  2. Patient will improve cervical AROM rotation to 50deg to increase ease with driving.               Eval: Cervical AROM rotation: (B) 35deg               Current: ongoing and N/A 9/28/21 - progressing  3. Patient will report being able to work a full day with pain no more then 1-2/10 to increase ease with work duties.              Eval: patient reports 5/10 pain level after working a full day              Current: ongoing and N/A 9/28/21 - progressing  4. Patient will report headaches will decreased to no more then 1x/week to assist with return to PLOF.                Eval: Patient reports headaches occur approximately 3x/week.               [id number]: patient continues to endorse headaches occurring throughout the week - progressing    PLAN  [x]  Upgrade activities as tolerated     [x]  Continue plan of care  [x]  Update interventions per flow sheet       []  Discharge due to:_  []  Other:_      Jany Wilcox, PT 9/28/2021  4:51 PM    Future Appointments   Date Time Provider Winter Mayoi   9/28/2021  5:15 PM Iam Hinton [facility]   9/30/2021  4:30 PM Jose Paul PTA [facility]   10/5/2021  5:15 PM Williams Winkler PT [facility]   10/7/2021  5:15 PM Jose Paul PTA [facility]   10/12/2021  6:00 PM Iam Hinton [facility]   3/14/2022  9:00 AM Starr Gore MD [facility]

## 2021-09-30 ENCOUNTER — HOSPITAL ENCOUNTER (OUTPATIENT)
Dept: PHYSICAL THERAPY | Age: 56
Discharge: HOME | End: 2021-09-30
Payer: OTHER GOVERNMENT

## 2021-09-30 PROCEDURE — 97140 MANUAL THERAPY 1/> REGIONS: CPT

## 2021-09-30 PROCEDURE — 97110 THERAPEUTIC EXERCISES: CPT

## 2021-09-30 PROCEDURE — 97112 NEUROMUSCULAR REEDUCATION: CPT

## 2021-09-30 NOTE — PROGRESS NOTES
PT DAILY TREATMENT NOTE     Patient Name: Ursula Fail  Date:2021  : 1965  [x]  Patient  Verified  Payor:  / Plan: [facility] / Product Type:  /    In time: 4:30  Out time:5:20  Total Treatment Time (min): 50  Visit #: 5 of 8    Medicare/BCBS Only   Total Timed Codes (min):   1:1 Treatment Time:         Treatment Area: Cervicalgia [M54.2]  Status post cervical spinal fusion [Z98.1]    SUBJECTIVE  Pain Level (0-10 scale): 3  Any medication changes, allergies to medications, adverse drug reactions, diagnosis change, or new procedure performed?: [x] No    [] Yes (see summary sheet for update)  Subjective functional status/changes:   [] No changes reported  \"Muscles tighten up from watching 2 computers. \"    OBJECTIVE    Modality rationale: decrease edema, decrease inflammation, decrease pain, increase tissue extensibility and increase muscle contraction/control to improve the patients ability to perform ADL    Min Type Additional Details    [] Estim:  []Unatt       []IFC  []Premod                        []Other:  []w/ice   []w/heat  Position:  Location:    [] Estim: []Att    []TENS instruct  []NMES                    []Other:  []w/US   []w/ice   []w/heat  Position:  Location:    []  Traction: [] Cervical       []Lumbar                       [] Prone          []Supine                       []Intermittent   []Continuous Lbs:  [] before manual  [] after manual    []  Ultrasound: []Continuous   [] Pulsed                           []1MHz   []3MHz W/cm2:  Location:    []  Iontophoresis with dexamethasone         Location: [] Take home patch   [] In clinic   10 []  Ice     [x]  heat  []  Ice massage  []  Laser   []  Anodyne Position:prone  Location:(B) Traps    []  Laser with stim  []  Other:  Position:  Location:    []  Vasopneumatic Device    []  Right     []  Left  Pre-treatment girth:  Post-treatment girth:  Measured at (location):  Pressure:       [] lo [] med [] hi Temperature: [] lo [] med [] hi   [x] Skin assessment post-treatment:  [x]intact []redness- no adverse reaction    []redness  adverse reaction:      min []Eval                  []Re-Eval       18 min Therapeutic Exercise:  [x] See flow sheet :   Rationale: increase ROM, increase strength and improve coordination to improve the patients ability to perform ADL      min Therapeutic Activity:  []  See flow sheet :   Rationale: increase ROM, increase strength and improve coordination  to improve the patients ability to perform ADL      10 min Neuromuscular Re-education:  []  See flow sheet :   Rationale: increase ROM, increase strength, improve coordination, improve balance and increase proprioception  to improve the patients ability to perform ADL    12 min Manual Therapy:  STM/DTM (B) Traps/TSP mob 2-3  prone   The manual therapy interventions were performed at a separate and distinct time from the therapeutic activities interventions. Rationale: decrease pain, increase ROM, increase tissue extensibility, decrease edema , decrease trigger points and increase postural awareness to perform ADL      min Gait Training:  ___ feet with ___ device on level surfaces with ___ level of assist   Rationale: With   [x] TE   [] TA   [] neuro   [] other: Patient Education: [x] Review HEP    [] Progressed/Changed HEP based on:   [] positioning   [] body mechanics   [] transfers   [] heat/ice application    [] other:      Other Objective/Functional Measures:      Pain Level (0-10 scale) post treatment: 1-2    ASSESSMENT/Changes in Function:  Pt presents with tightness at TSP and (B) traps during manual. Pt completed each there ex fairly well. Pt is progressing towards all goals. Following manual pt demos improved in CSP AROM. Therapist discussed with pt on performing CSP there ex at work to decrease tightness. PT understood.     Patient will continue to benefit from skilled PT services to address functional mobility deficits, address ROM deficits, address strength deficits, analyze and address soft tissue restrictions, analyze and cue movement patterns, analyze and modify body mechanics/ergonomics, assess and modify postural abnormalities and instruct in home and community integration to attain remaining goals. [x]  See Plan of Care  []  See progress note/recertification  []  See Discharge Summary         Progress towards goals / Updated goals:  1. Patient will be independent and compliant with HEP 1-2x/day to increase ease with ADLs.              Eval: HEP established              INWMDKR: I and compliant with HEP - met  2. Patient will improve (B) shoulder AROM flexion and scaption to 145deg to increase ease with overhead activities.              Eval: (B) shoulder AROM flex: 135deg, Abduction: 135deg              [id number]: will assess at next visit - progressing  Long Term Goals: To be accomplished in 4 weeks:  1. Patient will improve FOTO to at least 62 to demonstrate improved function.               Eval: FOTO: 53              Current: ongoing and N/A 9/28/21 - progressing  2. Patient will improve cervical AROM rotation to 50deg to increase ease with driving.               Eval: Cervical AROM rotation: (B) 35deg               Current: ongoing and N/A 9/28/21 - progressing  3. Patient will report being able to work a full day with pain no more then 1-2/10 to increase ease with work duties.              Eval: patient reports 5/10 pain level after working a full day              Current: 3-4 - progressing 9/30  4. Patient will report headaches will decreased to no more then 1x/week to assist with return to PLOF.                Eval: Patient reports headaches occur approximately 3x/week.               [id number]: patient stated H/A's are not as bad as they were - progressing 9/30    PLAN  [x]  Upgrade activities as tolerated     []  Continue plan of care  []  Update interventions per flow sheet       []  Discharge due to:_  []  Other:_      Kathrin Orantes, PTA 9/30/2021  4:31 PM    Future Appointments   Date Time Provider Winter Arguello   10/5/2021  5:15 PM Bee Faith PT [facility]   10/7/2021  5:15 PM Luis Fuentes PTA [facility]   10/12/2021  6:00 PM Iam Munoz [facility]   3/14/2022  9:00 AM Juliette Gore MD [facility] AM[facility]

## 2021-10-05 ENCOUNTER — HOSPITAL ENCOUNTER (OUTPATIENT)
Dept: PHYSICAL THERAPY | Age: 56
Discharge: HOME | End: 2021-10-05
Payer: OTHER GOVERNMENT

## 2021-10-05 PROCEDURE — 97110 THERAPEUTIC EXERCISES: CPT

## 2021-10-05 PROCEDURE — 97140 MANUAL THERAPY 1/> REGIONS: CPT

## 2021-10-05 PROCEDURE — 97112 NEUROMUSCULAR REEDUCATION: CPT

## 2021-10-05 NOTE — PROGRESS NOTES
PT DAILY TREATMENT NOTE     Patient Name: Malu Rutherford  Date:10/5/2021  : 1965  [x]  Patient  Verified  Payor: MATTHEW / Plan: Cornelius Mock 74 / Product Type:  /    In time: 5:12  Out time: 6:10  Total Treatment Time (min): 58  Visit #: 6 of 8    Treatment Area: Cervicalgia [M54.2]  Status post cervical spinal fusion [Z98.1]    SUBJECTIVE  Pain Level (0-10 scale): 3/10  Any medication changes, allergies to medications, adverse drug reactions, diagnosis change, or new procedure performed?: [x] No    [] Yes (see summary sheet for update)  Subjective functional status/changes:   [] No changes reported  Patient endorses continued stiffness throughout her cervical spine and upper traps.      OBJECTIVE    Modality rationale: decrease pain and increase tissue extensibility to improve the patients ability to perform daily activities without limitation from neck pain or stiffness   Min Type Additional Details    [] Estim:  []Unatt       []IFC  []Premod                        []Other:  []w/ice   []w/heat  Position:  Location:    [] Estim: []Att    []TENS instruct  []NMES                    []Other:  []w/US   []w/ice   []w/heat  Position:  Location:    []  Traction: [] Cervical       []Lumbar                       [] Prone          []Supine                       []Intermittent   []Continuous Lbs:  [] before manual  [] after manual    []  Ultrasound: []Continuous   [] Pulsed                           []1MHz   []3MHz W/cm2:  Location:    []  Iontophoresis with dexamethasone         Location: [] Take home patch   [] In clinic   10 []  Ice     [x]  heat  []  Ice massage  []  Laser   []  Anodyne Position: supine  Location: cervical spine    []  Laser with stim  []  Other:  Position:  Location:    []  Vasopneumatic Device    []  Right     []  Left  Pre-treatment girth:  Post-treatment girth:  Measured at (location):  Pressure:       [] lo [] med [] hi   Temperature: [] lo [] med [] hi   [] Skin assessment post-treatment:  []intact []redness- no adverse reaction    []redness  adverse reaction:     25 min Therapeutic Exercise:  [] See flow sheet :   Rationale: increase ROM and increase strength to improve the patients ability to perform daily activities without limitation from neck pain, stiffness, or muscle weakness     10 min Neuromuscular Re-education:  [x]  See flow sheet :   Rationale: increase strength and improve coordination  to improve the patients ability to perform daily activities with improved postural awareness and without limitation from neck pain    13 min Manual Therapy:  STM/DTM/TPR to bilateral upper traps and paraspinals with patient positioned in supine   The manual therapy interventions were performed at a separate and distinct time from the therapeutic activities interventions. Rationale: decrease pain, increase ROM, increase tissue extensibility and decrease trigger points to improve the patient's ability to perform ADLs without limitation from neck pain or stiffness          With   [x] TE   [] TA   [x] neuro   [] other: Patient Education: [x] Review HEP    [] Progressed/Changed HEP based on:   [] positioning   [] body mechanics   [] transfers   [] heat/ice application    [] other:      Other Objective/Functional Measures: Required verbal and visual cueing for cervical snags     Pain Level (0-10 scale) post treatment: 1/10    ASSESSMENT/Changes in Function: Patient again participated well throughout session and reported improved symptoms following exercise and manual therapy interventions. Patient is progressing well towards all goals and is beginning to demonstrate improved cervical ROM as well as muscle flexibility.      Patient will continue to benefit from skilled PT services to modify and progress therapeutic interventions, address functional mobility deficits, address ROM deficits, address strength deficits, analyze and address soft tissue restrictions, analyze and cue movement patterns, analyze and modify body mechanics/ergonomics, assess and modify postural abnormalities and instruct in home and community integration to attain remaining goals. [x]  See Plan of Care  []  See progress note/recertification  []  See Discharge Summary         Progress towards goals / Updated goals:  1. Patient will be independent and compliant with HEP 1-2x/day to increase ease with ADLs.              Eval: HEP established              Current: I and compliant with HEP - met  2. Patient will improve (B) shoulder AROM flexion and scaption to 145deg to increase ease with overhead activities.              Eval: (B) shoulder AROM flex: 135deg, Abduction: 135deg              TBHKCIS: will assess at next visit - progressing  Long Term Goals: To be accomplished in 4 weeks:  1. Patient will improve FOTO to at least 62 to demonstrate improved function.               Eval: FOTO: 53              Current: ongoing and N/A 10/5/21 - progressing  2. Patient will improve cervical AROM rotation to 50deg to increase ease with driving.               Eval: Cervical AROM rotation: (B) 35deg               Current: ongoing and N/A 10/5/21 - progressing  3. Patient will report being able to work a full day with pain no more then 1-2/10 to increase ease with work duties.              Eval: patient reports 5/10 pain level after working a full day              Current: 3/10 - progressing 10/5/21  4. Patient will report headaches will decreased to no more then 1x/week to assist with return to PLOF.                Eval: Patient reports headaches occur approximately 3x/week.               EUHPAYR: patient stated H/A's are not as bad as they were - progressing 9/30/21    PLAN  [x]  Upgrade activities as tolerated     [x]  Continue plan of care  [x]  Update interventions per flow sheet       []  Discharge due to:_  []  Other:_      Margaret Plaza, PT 10/5/2021  5:26 PM    Future Appointments   Date Time Provider Winter Arguello   10/7/2021  5:15 PM Emmanuel Escalante PTA [facility]   10/12/2021  6:00 PM Iam Dobson [facility]   3/14/2022  9:00 AM Hannah Gore MD [facility]

## 2021-10-07 ENCOUNTER — APPOINTMENT (OUTPATIENT)
Dept: PHYSICAL THERAPY | Age: 56
End: 2021-10-07
Payer: OTHER GOVERNMENT

## 2021-10-12 ENCOUNTER — HOSPITAL ENCOUNTER (OUTPATIENT)
Dept: PHYSICAL THERAPY | Age: 56
Discharge: HOME | End: 2021-10-12
Payer: OTHER GOVERNMENT

## 2021-10-12 PROCEDURE — 97112 NEUROMUSCULAR REEDUCATION: CPT

## 2021-10-12 PROCEDURE — 97140 MANUAL THERAPY 1/> REGIONS: CPT

## 2021-10-12 PROCEDURE — 97110 THERAPEUTIC EXERCISES: CPT

## 2021-10-12 NOTE — PROGRESS NOTES
PT DAILY TREATMENT NOTE     Patient Name: Shanita Read  Date:10/12/2021  : 1965  [x]  Patient  Verified  Payor: MATTHEW / Plan: Cornelius Mock 74 / Product Type:  /    In time: 5:10  Out time: 6:00  Total Treatment Time (min): 50   Visit #: 7 of 8    Treatment Area: Cervicalgia [M54.2]  Status post cervical spinal fusion [Z98.1]    SUBJECTIVE  Pain Level (0-10 scale): 3/10  Any medication changes, allergies to medications, adverse drug reactions, diagnosis change, or new procedure performed?: [x] No    [] Yes (see summary sheet for update)  Subjective functional status/changes:   [] No changes reported  Patient continues to endorse neck stiffness and soreness at rest today which she attributes to being at work all day.      OBJECTIVE    Modality rationale: decrease pain and increase tissue extensibility to improve the patients ability to perform daily activities without being limited by neck pain or stiffness   Min Type Additional Details    [] Estim:  []Unatt       []IFC  []Premod                        []Other:  []w/ice   []w/heat  Position:  Location:    [] Estim: []Att    []TENS instruct  []NMES                    []Other:  []w/US   []w/ice   []w/heat  Position:  Location:    []  Traction: [] Cervical       []Lumbar                       [] Prone          []Supine                       []Intermittent   []Continuous Lbs:  [] before manual  [] after manual    []  Ultrasound: []Continuous   [] Pulsed                           []1MHz   []3MHz W/cm2:  Location:    []  Iontophoresis with dexamethasone         Location: [] Take home patch   [] In clinic   10 []  Ice     [x]  heat post  []  Ice massage  []  Laser    []  Anodyne Position: supine  Location: cervical spine    []  Laser with stim  []  Other:  Position:  Location:    []  Vasopneumatic Device    []  Right     []  Left  Pre-treatment girth:  Post-treatment girth:  Measured at (location):  Pressure:       [] lo [] med [] hi Temperature: [] lo [] med [] hi   [] Skin assessment post-treatment:  []intact []redness- no adverse reaction    []redness  adverse reaction:     25 min Therapeutic Exercise:  [x] See flow sheet :   Rationale: increase ROM and increase strength to improve the patients ability to perform daily activities without limitation from neck pain, stiffness, or muscle weakness     10 min Neuromuscular Re-education:  [x]  See flow sheet :   Rationale: increase strength and improve coordination  to improve the patients ability to perform daily activities with improved postural awareness and without limitation from neck pain    10 min Manual Therapy:  STM/DTM/TPR to bilateral upper traps and paraspinals with patient positioned in supine   The manual therapy interventions were performed at a separate and distinct time from the therapeutic activities interventions. Rationale: decrease pain, increase ROM, increase tissue extensibility and decrease trigger points to improve the patient's ability to perform ADLs without limitation from neck pain or stiffness          With   [x] TE   [x] TA   [x] neuro   [] other: Patient Education: [x] Review HEP    [] Progressed/Changed HEP based on:   [] positioning   [] body mechanics   [] transfers   [] heat/ice application    [] other:      Other Objective/Functional Measures: FOTO 65     Pain Level (0-10 scale) post treatment: 1/10    ASSESSMENT/Changes in Function: Patient has attended 7 PT visits to date and demonstrates improved cervical ROM, muscle strength, postural awareness, and symptoms. Patient's FOTO has improved from 48 at initial evaluation to 72 today and patient perceives her improvement as 4/7 per FOTO. However, patient continues to endorse neck stiffness and soreness, especially following a long day of work.  Patient will continue to benefit from skilled PT services to modify and progress therapeutic interventions, address functional mobility deficits, address ROM deficits, address strength deficits, analyze and address soft tissue restrictions, analyze and cue movement patterns, analyze and modify body mechanics/ergonomics, assess and modify postural abnormalities and instruct in home and community integration to attain remaining goals. []  See Plan of Care  [x]  See progress note/recertification  []  See Discharge Summary         Progress towards goals / Updated goals:  1. Patient will be independent and compliant with HEP 1-2x/day to increase ease with ADLs.              Eval: HEP established              Current: I and compliant with HEP - met  2. Patient will improve (B) shoulder AROM flexion and scaption to 145deg to increase ease with overhead activities.              Eval: (B) shoulder AROM flex: 135deg, Abduction: 135deg              Current: shoulder AROM flexion: right 138 deg, left 135 deg; scaption: right 135 deg, left 135 deg - progressing  Long Term Goals: To be accomplished in 4 weeks:  1. Patient will improve FOTO to at least 62 to demonstrate improved function.               Eval: FOTO: 53              Current: 65 - met  2. Patient will improve cervical AROM rotation to 50deg to increase ease with driving.               Eval: Cervical AROM rotation: (B) 35deg               Current: Cervical AROM rotation: right 45 deg, left 48 deg - progressing  3. Patient will report being able to work a full day with pain no more than 1-2/10 to increase ease with work duties.              Eval: patient reports 5/10 pain level after working a full day              Current: patient reports 3/10 pain after working a full day - progressing  4. Patient will report headaches will decreased to no more then 1x/week to assist with return to PLOF.                Eval: Patient reports headaches occur approximately 3x/week.               Current: patient endorses less intense headaches compared to before, but still occurring 3-4 times/week - progressing    PLAN  [x]  Upgrade activities as tolerated     [x]  Continue plan of care  [x]  Update interventions per flow sheet       []  Discharge due to:_  []  Other:_      Neil Bowling, PT 10/12/2021  11:59 AM    Future Appointments   Date Time Provider Winter Arguello   10/12/2021  6:00 PM Julisa Pool, [place] [facility]   10/15/2021  4:30 PM Julisa Pool, PT [facility]   10/19/2021  3:00 PM Julisa Pool, PT [facility]   10/21/2021  3:45 PM Julisa Pool, PT [facility]   10/28/2021  4:30 PM Tere Madrid, JADEN [facility]   3/14/2022  9:00 AM Dickson Gore MD [facility] BS AMB

## 2021-10-12 NOTE — PROGRESS NOTES
[facility] [address], [address] [place] [address], [address] [place], [place] [address] [phone] fax    Physician Update  [x] Progress Note  [] Discharge Summary  Patient name: Jordi Pink Start of Care: 2021   Referral source: Christi Contreras : 1965   Medical/Treatment Diagnosis: Cervicalgia [M54.2]  Status post cervical spinal fusion [Z98.1]  Payor: MATTHEW / Plan: Cornelius Mock 74 / Product Type: Delmon Alycia /  Onset Date:21     Prior Hospitalization: see medical history Provider#: [id number]   Medications: Verified on Patient Summary List    Comorbidities: back pain, HTN, thyroid problems,  Low Back surg-    Prior Level of Function: Patient is right hand dominant. Patient reports prior to onset of neck pain in 2019 she did not have any functional deficits in regards to cervical spine. Visits from Start of Care: 7    Missed Visits: 1    Status at Evaluation/Last Progress Note: Patient presents with cervical pain s/p cervical fusion on 21. Patient denies any post-op complications. Patient describes cervical symptoms as intermittent aching/stiffness. Patient denies numbness/tingling and stated that went away following the surgery. Patient has increased difficulty with prolonged sitting at a desk for work, and turning her head. Patient reports average pain level is a 4/10. Patient also reports frequent headaches (3x/week approximately) that are located at forehead region. Patient stated headaches typically begin when her neck gets tired and stiffens up. Patient exhibits decreased cervical AROM with report of discomfort, decreased (B) shoulder AROM into flexion and scaption, postural dysfunction, and increased tenderness/tightness at (B) UT/levator scap and (B) thoracic paraspinals. Patient demonstrates potential to make functional gains within a reasonable time frame.  Patient would benefit from skilled PT to address above deficits and assist with return to PLOF. Progress towards Goals:   1. Patient will be independent and compliant with HEP 1-2x/day to increase ease with ADLs.              Eval: HEP established              Current: I and compliant with HEP - met  2. Patient will improve (B) shoulder AROM flexion and scaption to 145deg to increase ease with overhead activities.              Eval: (B) shoulder AROM flex: 135deg, Abduction: 135deg              Current: shoulder AROM flexion: right 138 deg, left 135 deg; scaption: right 135 deg, left 135 deg - progressing  Long Term Goals: To be accomplished in 4 weeks:  1. Patient will improve FOTO to at least 62 to demonstrate improved function.               Eval: FOTO: 53              Current: 65 - met  2. Patient will improve cervical AROM rotation to 50deg to increase ease with driving.               Eval: Cervical AROM rotation: (B) 35deg               Current: Cervical AROM rotation: right 45 deg, left 48 deg - progressing  3. Patient will report being able to work a full day with pain no more than 1-2/10 to increase ease with work duties.              Eval: patient reports 5/10 pain level after working a full day              Current: patient reports 3/10 pain after working a full day - progressing  4. Patient will report headaches will decreased to no more then 1x/week to assist with return to PLOF.              Eval: Patient reports headaches occur approximately 3x/week.               Current: patient endorses less intense headaches compared to before, but still occurring 3-4 times/week - progressing    Goals: to be achieved in 4 weeks:  1. Patient will improve (B) shoulder AROM flexion and scaption to 145deg to increase ease with overhead activities. PN: shoulder AROM flexion: right 138 deg, left 135 deg; scaption: right 135 deg, left 135 deg   2. Patient will improve cervical AROM rotation to 60 deg to increase ease with driving.    PN: Cervical AROM rotation: right 45 deg, left 48 deg  3. Patient will report being able to work a full day with pain no more than 1-2/10 to increase ease with work duties. PN: patient reports 3/10 pain after working a full day  4. Patient will report headaches will decreased to no more then 1x/week to assist with return to PLOF. PN: patient endorses less intense headaches compared to before, but still occurring 3-4 times/week    ASSESSMENT/RECOMMENDATIONS: Patient has attended 7 PT visits to date and demonstrates improved cervical ROM, muscle strength, postural awareness, and symptoms. Patient's FOTO has improved from 48 at initial evaluation to 72 today and patient perceives her improvement as 4/7 per FOTO. However, patient continues to endorse neck stiffness and soreness, especially following a long day of work. Patient will continue to benefit from skilled PT services to modify and progress therapeutic interventions, address functional mobility deficits, address ROM deficits, address strength deficits, analyze and address soft tissue restrictions, analyze and cue movement patterns, analyze and modify body mechanics/ergonomics, assess and modify postural abnormalities and instruct in home and community integration to attain remaining goals.   [x]Continue therapy per initial plan/protocol at a frequency of  2 x per week for 4 weeks  []Continue therapy with the following recommended changes:_____________________      _____________________________________________________________________  []Discontinue therapy progressing towards or have reached established goals  []Discontinue therapy due to lack of appreciable progress towards goals  []Discontinue therapy due to lack of attendance or compliance  []Await Physician's recommendations/decisions regarding therapy  []Other:________________________________________________________________    Thank you for this referral. Zeus Gomez, PT 10/12/2021 3:31 PM  NOTE TO PHYSICIAN:  [facility] AND   FAX TO [facility]: [phone]  If you are unable to process this request in 24 hours please contact our office: [phone]    ? I have read the above report and request that my patient continue as recommended. ? I have read the above report and request that my patient continue therapy with the following changes/special instructions:_____________________________________  ? I have read the above report and request that my patient be discharged from therapy.     Physician's Signature:____________Date:_________TIME:________     Cheryl Cabrera PA-C  ** Signature, Date and Time must be completed for valid certification **

## 2021-10-15 ENCOUNTER — HOSPITAL ENCOUNTER (OUTPATIENT)
Dept: PHYSICAL THERAPY | Age: 56
Discharge: HOME | End: 2021-10-15
Payer: OTHER GOVERNMENT

## 2021-10-15 PROCEDURE — 97140 MANUAL THERAPY 1/> REGIONS: CPT

## 2021-10-15 PROCEDURE — 97112 NEUROMUSCULAR REEDUCATION: CPT

## 2021-10-15 PROCEDURE — 97110 THERAPEUTIC EXERCISES: CPT

## 2021-10-15 NOTE — PROGRESS NOTES
PT DAILY TREATMENT NOTE     Patient Name: Maury Bales  Date:10/15/2021  : 1965  [x]  Patient  Verified  Payor: MATTHEW / Plan: Cornelius Mock 74 / Product Type:  /    In time: 4:30  Out time: 5:25  Total Treatment Time (min): 55  Visit #: 1 of 8    Treatment Area: Cervicalgia [M54.2]  Status post cervical spinal fusion [Z98.1]    SUBJECTIVE  Pain Level (0-10 scale): 3/10  Any medication changes, allergies to medications, adverse drug reactions, diagnosis change, or new procedure performed?: [x] No    [] Yes (see summary sheet for update)  Subjective functional status/changes:   [] No changes reported  Patient reports that as usual after a long work day, her neck feels fairly stiff. Patient reports that her neck felt better yesterday.      OBJECTIVE    Modality rationale: decrease pain and increase tissue extensibility to improve the patients ability to perform daily activities without being limited by neck pain or stiffness   Min Type Additional Details    [] Estim:  []Unatt       []IFC  []Premod                        []Other:  []w/ice   []w/heat  Position:  Location:    [] Estim: []Att    []TENS instruct  []NMES                    []Other:  []w/US   []w/ice   []w/heat  Position:  Location:    []  Traction: [] Cervical       []Lumbar                       [] Prone          []Supine                       []Intermittent   []Continuous Lbs:  [] before manual  [] after manual    []  Ultrasound: []Continuous   [] Pulsed                           []1MHz   []3MHz W/cm2:  Location:    []  Iontophoresis with dexamethasone         Location: [] Take home patch   [] In clinic   10 []  Ice     [x]  heat post  []  Ice massage  []  Laser   []  Anodyne Position: supine  Location: cervical spine    []  Laser with stim  []  Other:  Position:  Location:    []  Vasopneumatic Device    []  Right     []  Left  Pre-treatment girth:  Post-treatment girth:  Measured at (location):  Pressure:       [] lo [] med [] hi   Temperature: [] lo [] med [] hi   [] Skin assessment post-treatment:  []intact []redness- no adverse reaction    []redness  adverse reaction:     25 min Therapeutic Exercise:  [x] See flow sheet :   Rationale: increase ROM and increase strength to improve the patients ability to perform daily activities without limitation from neck pain, stiffness, or muscle weakness     10 min Neuromuscular Re-education:  [x]  See flow sheet :   Rationale: increase strength and improve coordination  to improve the patients ability to perform daily activities with improved postural awareness and without limitation from neck pain    10 min Manual Therapy:  STM/DTM/TPR to bilateral upper traps and paraspinals with patient positioned in supine   The manual therapy interventions were performed at a separate and distinct time from the therapeutic activities interventions. Rationale: decrease pain, increase ROM, increase tissue extensibility and decrease trigger points to improve the patient's ability to perform ADLs without limitation from neck pain or stiffness          With   [x] TE   [x] TA   [x] neuro   [] other: Patient Education: [x] Review HEP    [] Progressed/Changed HEP based on:   [] positioning   [] body mechanics   [] transfers   [] heat/ice application    [] other:      Other Objective/Functional Measures:   Initiated upper trap lift offs  Progressed exercises per flow sheet     Pain Level (0-10 scale) post treatment: 1/10    ASSESSMENT/Changes in Function: Patient tolerated exercise progressions well today. Patient did however report posterior neck pain during upper trap lift offs with yellow theraband and therefore will attempt again next session without theraband resistance. Patient reported the same pain with wall angels. Patient continues to progress well towards goals, however, does still endorse daily neck stiffness, especially following a long work day.      Patient will continue to benefit from skilled PT services to modify and progress therapeutic interventions, address functional mobility deficits, address ROM deficits, address strength deficits, analyze and address soft tissue restrictions, analyze and cue movement patterns, analyze and modify body mechanics/ergonomics, assess and modify postural abnormalities and instruct in home and community integration to attain remaining goals. []  See Plan of Care  [x]  See progress note/recertification  []  See Discharge Summary         Progress towards goals / Updated goals:  1. Patient will improve (B) shoulder AROM flexion and scaption to 145deg to increase ease with overhead activities. PN: shoulder AROM flexion: right 138 deg, left 135 deg; scaption: right 135 deg, left 135 deg   2. Patient will improve cervical AROM rotation to 60 deg to increase ease with driving. PN: Cervical AROM rotation: right 45 deg, left 48 deg  3. Patient will report being able to work a full day with pain no more than 1-2/10 to increase ease with work duties. PN: patient reports 3/10 pain after working a full day  4. Patient will report headaches will decreased to no more then 1x/week to assist with return to PLOF.               PN: patient endorses less intense headaches compared to before, but still occurring 3-4 times/week    PLAN  [x]  Upgrade activities as tolerated     [x]  Continue plan of care  [x]  Update interventions per flow sheet       []  Discharge due to:_  []  Other:_      Ade Cooper, PT 10/15/2021  4:24 PM    Future Appointments   Date Time Provider Winter Arguello   10/15/2021  4:30 PM Gabbi Cooney PT [facility]   10/19/2021  3:00 PM Gabbi Cooney PT [facility]   10/21/2021  3:45 PM Gabbi Cooney PT [facility]   10/28/2021  4:30 PM Vandana Carmen PTA [facility]   3/14/2022  9:00 AM Simona Gore MD [facility] BS AMB

## 2021-10-19 ENCOUNTER — HOSPITAL ENCOUNTER (OUTPATIENT)
Dept: PHYSICAL THERAPY | Age: 56
Discharge: HOME | End: 2021-10-19
Payer: OTHER GOVERNMENT

## 2021-10-19 PROCEDURE — 97110 THERAPEUTIC EXERCISES: CPT

## 2021-10-19 PROCEDURE — 97112 NEUROMUSCULAR REEDUCATION: CPT

## 2021-10-19 PROCEDURE — 97140 MANUAL THERAPY 1/> REGIONS: CPT

## 2021-10-19 NOTE — PROGRESS NOTES
PT DAILY TREATMENT NOTE     Patient Name: Keron Fernandez  Date:10/19/2021  : 1965  [x]  Patient  Verified  Payor:  / Plan: Gildardo Wilcox / Product Type:  /    In time: 3:00  Out time: 3:55  Total Treatment Time (min): 55  Visit #: 2 of 8    Treatment Area: Cervicalgia [M54.2]  Status post cervical spinal fusion [Z98.1]    SUBJECTIVE  Pain Level (0-10 scale): 3/10  Any medication changes, allergies to medications, adverse drug reactions, diagnosis change, or new procedure performed?: [x] No    [] Yes (see summary sheet for update)  Subjective functional status/changes:   [] No changes reported  Patient reports that she feels about the same today.      OBJECTIVE    Modality rationale: decrease pain and increase tissue extensibility to improve the patients ability to perform daily activities without being limited by neck pain or stiffness   Min Type Additional Details    [] Estim:  []Unatt       []IFC  []Premod                        []Other:  []w/ice   []w/heat  Position:  Location:    [] Estim: []Att    []TENS instruct  []NMES                    []Other:  []w/US   []w/ice   []w/heat  Position:  Location:    []  Traction: [] Cervical       []Lumbar                       [] Prone          []Supine                       []Intermittent   []Continuous Lbs:  [] before manual  [] after manual    []  Ultrasound: []Continuous   [] Pulsed                           []1MHz   []3MHz W/cm2:  Location:    []  Iontophoresis with dexamethasone         Location: [] Take home patch   [] In clinic   10 []  Ice     [x]  heat post  []  Ice massage  []  Laser   []  Anodyne Position: supine  Location: cervical spine + upper traps    []  Laser with stim  []  Other:  Position:  Location:    []  Vasopneumatic Device    []  Right     []  Left  Pre-treatment girth:  Post-treatment girth:  Measured at (location):  Pressure:       [] lo [] med [] hi   Temperature: [] lo [] med [] hi   [] Skin assessment post-treatment:  []intact []redness- no adverse reaction    []redness  adverse reaction:     25 min Therapeutic Exercise:  [x] See flow sheet :   Rationale: increase ROM and increase strength to improve the patients ability to perform daily activities without limitation from neck pain, stiffness, or muscle weakness     10 min Neuromuscular Re-education:  []  See flow sheet :   Rationale: increase strength and improve coordination  to improve the patients ability to perform daily activities with improved postural awareness and without limitation from neck pain    10 min Manual Therapy:  STM/DTM/TPR to bilateral upper traps and paraspinals with patient positioned in supine   The manual therapy interventions were performed at a separate and distinct time from the therapeutic activities interventions. Rationale: decrease pain, increase ROM, increase tissue extensibility and decrease trigger points to improve the patient's ability to perform ADLs without limitation from neck pain or stiffness          With   [x] TE   [x] TA   [x] neuro   [] other: Patient Education: [x] Review HEP    [] Progressed/Changed HEP based on:   [] positioning   [] body mechanics   [] transfers   [] heat/ice application    [] other:      Other Objective/Functional Measures: Added scap retractions + walkouts at [facility]  Added wall angels     Pain Level (0-10 scale) post treatment: 1/10    ASSESSMENT/Changes in Function: Patient again tolerated exercise progressions well and continues to demonstrate improved cervical spine and shoulder ROM, postural awareness, and overall activity tolerance.      Patient will continue to benefit from skilled PT services to modify and progress therapeutic interventions, address functional mobility deficits, address ROM deficits, address strength deficits, analyze and address soft tissue restrictions, analyze and cue movement patterns, analyze and modify body mechanics/ergonomics, assess and modify postural abnormalities and instruct in home and community integration to attain remaining goals. [x]  See Plan of Care  [x]  See progress note/recertification  []  See Discharge Summary         Progress towards goals / Updated goals:  1. Patient will improve (B) shoulder AROM flexion and scaption to 145deg to increase ease with overhead activities.             PN: shoulder AROM flexion: right 138 deg, left 135 deg; scaption: right 135 deg, left 135 deg   2. Patient will improve cervical AROM rotation to 60 deg to increase ease with driving.              PN: Cervical AROM rotation: right 45 deg, left 48 deg  3. Patient will report being able to work a full day with pain no more than 1-2/10 to increase ease with work duties.             PN: patient reports 3/10 pain after working a full day  4. Patient will report headaches will decreased to no more then 1x/week to assist with return to PLOF.               PN: patient endorses less intense headaches compared to before, but still occurring 3-4 times/week    PLAN  [x]  Upgrade activities as tolerated     [x]  Continue plan of care  [x]  Update interventions per flow sheet       []  Discharge due to:_  []  Other:_      Duarte Friend, PT 10/19/2021  3:03 PM    Future Appointments   Date Time Provider Winter Arguello   10/21/2021  3:45 PM Xiomara Russell, PT [facility]   10/28/2021  4:30 PM Grace Novak PTA [facility]   3/14/2022  9:00 AM [facility] Room Kervin Aguilar MD [facility] BS AMB

## 2021-10-21 ENCOUNTER — HOSPITAL ENCOUNTER (OUTPATIENT)
Dept: PHYSICAL THERAPY | Age: 56
Discharge: HOME | End: 2021-10-21
Payer: OTHER GOVERNMENT

## 2021-10-21 PROCEDURE — 97112 NEUROMUSCULAR REEDUCATION: CPT

## 2021-10-21 PROCEDURE — 97110 THERAPEUTIC EXERCISES: CPT

## 2021-10-21 PROCEDURE — 97140 MANUAL THERAPY 1/> REGIONS: CPT

## 2021-10-21 NOTE — PROGRESS NOTES
PT DAILY TREATMENT NOTE     Patient Name: Jasvir Boyd  WWEM:  : 1965  [x]  Patient  Verified  Payor: MATTHEW / Plan: Cornelius Mock 74 / Product Type:  /    In time: 3:49  Out time: 4:40  Total Treatment Time (min): 51  Visit #: 3 of 8    Treatment Area: Cervicalgia [M54.2]  Status post cervical spinal fusion [Z98.1]    SUBJECTIVE  Pain Level (0-10 scale): 3/10  Any medication changes, allergies to medications, adverse drug reactions, diagnosis change, or new procedure performed?: [x] No    [] Yes (see summary sheet for update)  Subjective functional status/changes:   [] No changes reported  Patient reports that she was somewhat sore after her last visit. Patient endorses neck stiffness following her work day today.      OBJECTIVE    Modality rationale: decrease pain and increase tissue extensibility to improve the patients ability to perform daily activities without being limited by neck pain or stiffness   Min Type Additional Details    [] Estim:  []Unatt       []IFC  []Premod                        []Other:  []w/ice   []w/heat  Position:  Location:    [] Estim: []Att    []TENS instruct  []NMES                    []Other:  []w/US   []w/ice   []w/heat  Position:  Location:    []  Traction: [] Cervical       []Lumbar                       [] Prone          []Supine                       []Intermittent   []Continuous Lbs:  [] before manual  [] after manual    []  Ultrasound: []Continuous   [] Pulsed                           []1MHz   []3MHz W/cm2:  Location:    []  Iontophoresis with dexamethasone         Location: [] Take home patch   [] In clinic   10 []  Ice     [x]  heat post  []  Ice massage  []  Laser   []  Anodyne Position: supine  Location: cervical spine + UT    []  Laser with stim  []  Other:  Position:  Location:    []  Vasopneumatic Device    []  Right     []  Left  Pre-treatment girth:  Post-treatment girth:  Measured at (location):  Pressure:       [] lo [] med [] hi   Temperature: [] lo [] med [] hi   [] Skin assessment post-treatment:  []intact []redness- no adverse reaction    []redness  adverse reaction:     23 min Therapeutic Exercise:  [x] See flow sheet :   Rationale: increase ROM and increase strength to improve the patients ability to perform daily activities without limitation from neck pain, stiffness, or muscle weakness     8 min Neuromuscular Re-education:  [x]  See flow sheet :   Rationale: increase strength and improve coordination  to improve the patients ability to perform daily activities with improved postural awareness and without limitation from neck pain    10 min Manual Therapy: STM/DTM/TPR to bilateral upper traps and paraspinals with patient positioned in supine   The manual therapy interventions were performed at a separate and distinct time from the therapeutic activities interventions. Rationale: decrease pain, increase ROM, increase tissue extensibility and decrease trigger points to improve the patient's ability to perform ADLs without limitation from neck pain or stiffness          With   [x] TE   [x] TA   [x] neuro   [] other: Patient Education: [x] Review HEP    [] Progressed/Changed HEP based on:   [] positioning   [] body mechanics   [] transfers   [] heat/ice application    [] other:      Other Objective/Functional Measures:   Increased weight for scapular retraction walk outs at [facility]     Pain Level (0-10 scale) post treatment: 1/10    ASSESSMENT/Changes in Function: Patient again participated well throughout session despite reports of mild pain and neck stiffness upon arrival. Patient is progressing as expected towards goals and is tolerating exercise progressions well.  Patient will continue to benefit from skilled PT services to modify and progress therapeutic interventions, address functional mobility deficits, address ROM deficits, address strength deficits, analyze and address soft tissue restrictions, analyze and cue movement patterns, analyze and modify body mechanics/ergonomics, assess and modify postural abnormalities and instruct in home and community integration to attain remaining goals. []  See Plan of Care  [x]  See progress note/recertification  []  See Discharge Summary         Progress towards goals / Updated goals:  1. Patient will improve (B) shoulder AROM flexion and scaption to 145deg to increase ease with overhead activities.             PN: shoulder AROM flexion: right 138 deg, left 135 deg; scaption: right 135 deg, left 135 deg   2. Patient will improve cervical AROM rotation to 60 deg to increase ease with driving.              PN: Cervical AROM rotation: right 45 deg, left 48 deg  3. Patient will report being able to work a full day with pain no more than 1-2/10 to increase ease with work duties.             PN: patient reports 3/10 pain after working a full day  4. Patient will report headaches will decreased to no more then 1x/week to assist with return to PLOF.               PN: patient endorses less intense headaches compared to before, but still occurring 3-4 times/week    PLAN  [x]  Upgrade activities as tolerated     [x]  Continue plan of care  [x]  Update interventions per flow sheet       []  Discharge due to:_  []  Other:_      Vik Cruz, PT 10/21/2021  2:04 PM    Future Appointments   Date Time Provider Winter Arguello   10/21/2021  3:45 PM Darius Hinojosa PT [facility]   10/28/2021  4:30 PM Bridget Gomes PTA [facility]   3/14/2022  9:00 AM Marco Antonio Saha MD [facility] BS AMB

## 2021-10-28 ENCOUNTER — APPOINTMENT (OUTPATIENT)
Dept: PHYSICAL THERAPY | Age: 56
End: 2021-10-28
Payer: OTHER GOVERNMENT

## 2021-12-03 NOTE — PROGRESS NOTES
[facility] [address], [address] [place] [address], [address]  [phone] fax    Discharge Summary    Patient name: Alexander Burciaga     Start of Care: 2021  Referral source: Amador Logan    : 1965  Medical/Treatment Diagnosis: Cervicalgia [M54.2]  Status post cervical spinal fusion [Z98.1]  Payor: MATTHEW / Plan: Cornelius Mock 74 / Product Type: Radha [place] /        Onset Date:21               Prior Hospitalization: see medical history   Provider#: [id number]  Comorbidities: back pain, HTN, thyroid problems,  Low Back surg-   Prior Level of Function:Patient is right hand dominant. Patient reports prior to onset of neck pain in  she did not have any functional deficits in regards to cervical spine  Medications: Verified on Patient Summary List    Visits from Start of Care: 10    Missed Visits: 3  Reporting Period : 10/15 to 10/21      Summary of Care:  Goal:1. Patient will improve (B) shoulder AROM flexion and scaption to 145deg to increase ease with overhead activities.             PN: shoulder AROM flexion: right 138 deg, left 135 deg; scaption: right 135 deg, left 135 deg   2. Patient will improve cervical AROM rotation to 60 deg to increase ease with driving.              PN: Cervical AROM rotation: right 45 deg, left 48 deg  3. Patient will report being able to work a full day with pain no more than 1-2/10 to increase ease with work duties.             PN: patient reports 3/10 pain after working a full day  4. Patient will report headaches will decreased to no more then 1x/week to assist with return to PLOF.               PN: patient endorses less intense headaches compared to before, but still occurring 3-4 times/week  ASSESSMENT/RECOMMENDATIONS:  []Discontinue therapy progressing towards or have reached established goals  []Discontinue therapy due to lack of appreciable progress towards goals  []Discontinue therapy due to lack of attendance or compliance  [x]Other:Pt has not returned since last appointment 10/21    Thank you for this referral.     Carlene Romero, PTA 12/3/2021 9:08 AM

## 2022-01-11 RX ORDER — SIMVASTATIN 40 MG/1
40 TABLET, FILM COATED ORAL
Qty: 90 TABLET | Refills: 3 | Status: CANCELLED | OUTPATIENT
Start: 2022-01-11

## 2022-01-11 RX ORDER — HYDROCHLOROTHIAZIDE 25 MG/1
25 TABLET ORAL DAILY
Qty: 90 TABLET | Refills: 3 | Status: CANCELLED | OUTPATIENT
Start: 2022-01-11

## 2022-01-11 NOTE — TELEPHONE ENCOUNTER
Last Visit: 9/13/21 with MD Padmini Morrison  Next Appointment: 3/14/22 with MD Padmini Morrison  Previous Refill Encounter(s): 11/3/20 Zocor #90 with 3 refills, 12/24/20 HCTZ #90 with 3 refills    Requested Prescriptions     Pending Prescriptions Disp Refills    simvastatin (ZOCOR) 40 mg tablet [Pharmacy Med Name: SIMVASTATIN 40 MG TABLET] 90 Tablet 3     Sig: take 1 tablet by mouth nightly    hydroCHLOROthiazide (HYDRODIURIL) 25 mg tablet [Pharmacy Med Name: HYDROCHLOROTHIAZIDE 25 MG TAB] 90 Tablet 3     Sig: take 1 tablet by mouth once daily

## 2022-01-14 RX ORDER — HYDROCHLOROTHIAZIDE 25 MG/1
TABLET ORAL
Qty: 90 TABLET | Refills: 3 | Status: SHIPPED | OUTPATIENT
Start: 2022-01-14

## 2022-01-14 RX ORDER — SIMVASTATIN 40 MG/1
TABLET, FILM COATED ORAL
Qty: 90 TABLET | Refills: 3 | Status: SHIPPED | OUTPATIENT
Start: 2022-01-14

## 2022-01-27 ENCOUNTER — HOSPITAL ENCOUNTER (OUTPATIENT)
Dept: GENERAL RADIOLOGY | Age: 57
Discharge: HOME | End: 2022-01-27
Payer: OTHER GOVERNMENT

## 2022-01-27 DIAGNOSIS — M54.2 NECK PAIN: ICD-10-CM

## 2022-01-27 PROCEDURE — 72050 X-RAY EXAM NECK SPINE 4/5VWS: CPT

## 2022-03-20 PROBLEM — M47.12 CERVICAL SPONDYLOSIS WITH MYELOPATHY: Status: ACTIVE | Noted: 2021-06-07

## 2022-04-11 ENCOUNTER — HOSPITAL ENCOUNTER (OUTPATIENT)
Dept: GENERAL RADIOLOGY | Age: 57
Discharge: HOME | End: 2022-04-11
Payer: OTHER GOVERNMENT

## 2022-04-11 DIAGNOSIS — M96.1 POST LAMINECTOMY SYNDROME: ICD-10-CM

## 2022-04-11 PROCEDURE — 72050 X-RAY EXAM NECK SPINE 4/5VWS: CPT

## 2022-04-12 ENCOUNTER — VIRTUAL VISIT (OUTPATIENT)
Dept: FAMILY MEDICINE CLINIC | Age: 57
End: 2022-04-12
Payer: OTHER GOVERNMENT

## 2022-04-12 DIAGNOSIS — E78.00 HYPERCHOLESTEROLEMIA: ICD-10-CM

## 2022-04-12 DIAGNOSIS — R73.09 ELEVATED HEMOGLOBIN A1C: Primary | ICD-10-CM

## 2022-04-12 DIAGNOSIS — I10 ESSENTIAL HYPERTENSION: ICD-10-CM

## 2022-04-12 PROCEDURE — 99214 OFFICE O/P EST MOD 30 MIN: CPT | Performed by: FAMILY MEDICINE

## 2022-04-12 NOTE — PATIENT INSTRUCTIONS
Learning About Diuretics for High Blood Pressure  Overview  Diuretics help to lower blood pressure. This reduces your risk of a heart attack and stroke. It also reduces your risk of kidney disease. Diuretics cause your kidneys to remove sodium and water. They also relax the blood vessel walls. These help lower your blood pressure. Examples  · Chlorthalidone  · Hydrochlorothiazide  Possible side effects  There are some common side effects. They are:  · Too little potassium. · Feeling dizzy. · Rash. · Urinating a lot. · High blood sugar. (But this is not common.)  You may have other side effects. Check the information that comes with your medicine. What to know about taking this medicine  · You may take other medicines for blood pressure. Diuretics can help those work better. They can also prevent extra fluid in your body. · You may need to take potassium pills. Ask your doctor about this. · You may need blood tests to check on your health. For example, you may have tests to check your kidneys and your potassium level. · Take your medicines exactly as prescribed. Call your doctor if you think you are having a problem with your medicine. · Check with your doctor or pharmacist before you use any other medicines. This includes over-the-counter medicines. Make sure your doctor knows all of the medicines, vitamins, herbal products, and supplements you take. Taking some medicines together can cause problems. Where can you learn more? Go to http://www.gray.com/  Enter J402 in the search box to learn more about \"Learning About Diuretics for High Blood Pressure. \"  Current as of: January 10, 2022               Content Version: 13.2  © 2006-2022 [employer]. Care instructions adapted under license by [employer] (which disclaims liability or warranty for this information).  If you have questions about a medical condition or this instruction, always ask your healthcare professional. [address] any warranty or liability for your use of this information.

## 2022-04-12 NOTE — PROGRESS NOTES
Vito Esteban is a 49 y.o. female who was seen by synchronous (real-time) audio-video technology on 4/12/2022 for Hypertension (6 m f/u)        Assessment & Plan:   Diagnoses and all orders for this visit:    1. Elevated hemoglobin A1c  -     HEMOGLOBIN A1C WITH EAG; Future    2. Essential hypertension  -     METABOLIC PANEL, COMPREHENSIVE; Future    3. Hypercholesterolemia  -     LIPID PANEL; Future        As above  Patient is stable  Labs as ordered  Follow-up and Dispositions    · Return in about 6 months (around 10/12/2022) for well exam.       This has been fully explained to the patient, who indicates understanding. Patient will call  to be placed on the lab schedule  [phone]  Subjective:     Patient is here to follow-up on high blood pressure. She is on hydrochlorothiazide for her blood pressure. She takes the medication daily and tolerates the meds without any concern. Patient also has hypercholesterolemia and is on Zocor. No refills are needed. She is due for blood work. She has been to her Thyroid specialist.  She has had a thyroid ultrasound. There has been no changes in her thyroid nodules. She states she does not need any medications for her thyroid at this time. She will have follow-up in a year for her thyroid. She is followed by GYN for her GYN health. She has been given an order for her mammogram.  She states she needs to call to schedule this. Patient also states that she does not recall having chickenpox. She does not need a shingles vaccination. She has no other complaints today.   Lab Results   Component Value Date/Time    Cholesterol, total 189 09/13/2021 10:36 AM    HDL Cholesterol 46 09/13/2021 10:36 AM    LDL, calculated 131.8 (H) 09/13/2021 10:36 AM    VLDL, calculated 11.2 09/13/2021 10:36 AM    Triglyceride 56 09/13/2021 10:36 AM    CHOL/HDL Ratio 4.1 09/13/2021 10:36 AM     Lab Results   Component Value Date/Time    Sodium 141 09/13/2021 10:36 AM    Potassium 3.3 (L) 09/13/2021 10:36 AM    Chloride 105 09/13/2021 10:36 AM    CO2 29 09/13/2021 10:36 AM    Anion gap 7 09/13/2021 10:36 AM    Glucose 86 09/13/2021 10:36 AM    BUN 13 09/13/2021 10:36 AM    Creatinine 0.84 09/13/2021 10:36 AM    BUN/Creatinine ratio 15 09/13/2021 10:36 AM    GFR est AA >60 09/13/2021 10:36 AM    GFR est non-AA >60 09/13/2021 10:36 AM    Calcium 9.3 09/13/2021 10:36 AM    Bilirubin, total 0.6 09/13/2021 10:36 AM    Alk. phosphatase 62 09/13/2021 10:36 AM    Protein, total 7.7 09/13/2021 10:36 AM    Albumin 3.9 09/13/2021 10:36 AM    Globulin 3.8 09/13/2021 10:36 AM    A-G Ratio 1.0 09/13/2021 10:36 AM    ALT (SGPT) 30 09/13/2021 10:36 AM    AST (SGOT) 19 09/13/2021 10:36 AM           Prior to Admission medications    Medication Sig Start Date End Date Taking? Authorizing Provider   simvastatin (ZOCOR) 40 mg tablet take 1 tablet by mouth nightly 1/14/22  Yes Moises Kocher, MD   hydroCHLOROthiazide (HYDRODIURIL) 25 mg tablet take 1 tablet by mouth once daily 1/14/22  Yes Moises Kocher, MD   medroxyprogesterone (DEPO-PROVERA) 150 mg/mL Syrg 150 mg by IntraMUSCular route every three (3) months. Yes Provider, Historical   MULTIVITAMINS (MULTI-VITAMIN PO) Take 1 Tab by mouth daily.    Yes Provider, Historical     Patient Active Problem List    Diagnosis Date Noted    Cervical spondylosis with myelopathy 06/07/2021    Multiple thyroid nodules 01/11/2016    Nausea     Facet arthropathy     Heart murmur     Iritis      No Known Allergies  Past Medical History:   Diagnosis Date    Facet arthropathy     Heart murmur 2006    High cholesterol     Hypertension     Iritis     Low back pain     Nausea     Spinal stenosis     Thyroid disease     Goiter      Past Surgical History:   Procedure Laterality Date    HX ORTHOPAEDIC  2009, 2021    back surgery, neck surgery      Family History   Problem Relation Age of Onset    Diabetes Mother     Heart Disease Father         CAD     Social History     Tobacco Use    Smoking status: Never Smoker    Smokeless tobacco: Never Used   Substance Use Topics    Alcohol use: No       ROS as per HPI    Objective:   No flowsheet data found. General: alert, cooperative, no distress   Mental  status: normal mood, behavior, speech, dress, motor activity, and thought processes, able to follow commands   HENT: NCAT   Neck: no visualized mass   Resp: no respiratory distress   Neuro: no gross deficits   Skin: no discoloration or lesions of concern on visible areas   Psychiatric: normal affect, consistent with stated mood, no evidence of hallucinations     Additional exam findings: None      We discussed the expected course, resolution and complications of the diagnosis(es) in detail. Medication risks, benefits, costs, interactions, and alternatives were discussed as indicated. I advised her to contact the office if her condition worsens, changes or fails to improve as anticipated. She expressed understanding with the diagnosis(es) and plan. Gavin Arriaza, was evaluated through a synchronous (real-time) audio-video encounter. The patient (or guardian if applicable) is aware that this is a billable service, which includes applicable co-pays. Verbal consent to proceed has been obtained. The visit was conducted pursuant to the emergency declaration under the [facility], [address] [place] [address] [facility] authority and the [employer] General Act. Patient identification was verified, and a caregiver was present when appropriate. The patient was located at home in a state where the provider was licensed to provide care.     German Carvajal MD

## 2022-04-12 NOTE — PROGRESS NOTES
Nicho Parham (: 1965) is a 49 y.o. female, established patient, here for evaluation of the following chief complaint(s):   Hypertension (6 m f/u)           Nicho Parham, was evaluated through a synchronous (real-time) audio-video encounter. The patient (or guardian if applicable) is aware that this is a billable service, which includes applicable co-pays. Verbal consent to proceed has been obtained. The visit was conducted pursuant to the emergency declaration under the [address] [place] [address], [address] [place] [address] authority and the [employer] General Act. Patient identification was verified, and a caregiver was present when appropriate. The patient was located at home in a state where the provider was licensed to provide care. An electronic signature was used to authenticate this note.   -- Adelita Wynn

## 2022-04-25 ENCOUNTER — APPOINTMENT (OUTPATIENT)
Dept: FAMILY MEDICINE CLINIC | Age: 57
End: 2022-04-25

## 2022-04-25 ENCOUNTER — HOSPITAL ENCOUNTER (OUTPATIENT)
Dept: LAB | Age: 57
Discharge: HOME | End: 2022-04-25
Payer: OTHER GOVERNMENT

## 2022-04-25 DIAGNOSIS — E78.00 HYPERCHOLESTEROLEMIA: ICD-10-CM

## 2022-04-25 DIAGNOSIS — I10 ESSENTIAL HYPERTENSION: ICD-10-CM

## 2022-04-25 DIAGNOSIS — R73.09 ELEVATED HEMOGLOBIN A1C: ICD-10-CM

## 2022-04-25 LAB
ALBUMIN SERPL-MCNC: 3.7 G/DL (ref 3.4–5)
ALBUMIN/GLOB SERPL: 1.1 {RATIO} (ref 0.8–1.7)
ALP SERPL-CCNC: 67 U/L (ref 45–117)
ALT SERPL-CCNC: 21 U/L (ref 13–56)
ANION GAP SERPL CALC-SCNC: 6 MMOL/L (ref 3–18)
AST SERPL-CCNC: 15 U/L (ref 10–38)
BILIRUB SERPL-MCNC: 0.5 MG/DL (ref 0.2–1)
BUN SERPL-MCNC: 12 MG/DL (ref 7–18)
BUN/CREAT SERPL: 14 (ref 12–20)
CALCIUM SERPL-MCNC: 8.9 MG/DL (ref 8.5–10.1)
CHLORIDE SERPL-SCNC: 108 MMOL/L (ref 100–111)
CHOLEST SERPL-MCNC: 187 MG/DL
CO2 SERPL-SCNC: 28 MMOL/L (ref 21–32)
CREAT SERPL-MCNC: 0.85 MG/DL (ref 0.6–1.3)
EST. AVERAGE GLUCOSE BLD GHB EST-MCNC: 108 MG/DL
GLOBULIN SER CALC-MCNC: 3.4 G/DL (ref 2–4)
GLUCOSE SERPL-MCNC: 114 MG/DL (ref 74–99)
HBA1C MFR BLD: 5.4 % (ref 4.2–5.6)
HDLC SERPL-MCNC: 44 MG/DL (ref 40–60)
HDLC SERPL: 4.3 {RATIO} (ref 0–5)
LDLC SERPL CALC-MCNC: 130.4 MG/DL (ref 0–100)
LIPID PROFILE,FLP: ABNORMAL
POTASSIUM SERPL-SCNC: 3.5 MMOL/L (ref 3.5–5.5)
PROT SERPL-MCNC: 7.1 G/DL (ref 6.4–8.2)
SODIUM SERPL-SCNC: 142 MMOL/L (ref 136–145)
TRIGL SERPL-MCNC: 63 MG/DL (ref ?–150)
VLDLC SERPL CALC-MCNC: 12.6 MG/DL

## 2022-04-25 PROCEDURE — 80053 COMPREHEN METABOLIC PANEL: CPT

## 2022-04-25 PROCEDURE — 36415 COLL VENOUS BLD VENIPUNCTURE: CPT

## 2022-04-25 PROCEDURE — 83036 HEMOGLOBIN GLYCOSYLATED A1C: CPT

## 2022-04-25 PROCEDURE — 80061 LIPID PANEL: CPT

## 2022-07-13 ENCOUNTER — TRANSCRIBE ORDER (OUTPATIENT)
Dept: SCHEDULING | Age: 57
End: 2022-07-13

## 2022-07-13 DIAGNOSIS — M47.9 SPONDYLOSIS: Primary | ICD-10-CM

## 2022-07-20 ENCOUNTER — HOSPITAL ENCOUNTER (OUTPATIENT)
Dept: CT IMAGING | Age: 57
Discharge: HOME | End: 2022-07-20
Attending: ORTHOPAEDIC SURGERY
Payer: OTHER GOVERNMENT

## 2022-07-20 DIAGNOSIS — M47.9 SPONDYLOSIS: ICD-10-CM

## 2022-07-20 PROCEDURE — 72125 CT NECK SPINE W/O DYE: CPT

## 2022-11-08 ENCOUNTER — HOSPITAL ENCOUNTER (OUTPATIENT)
Dept: LAB | Age: 57
Discharge: HOME | End: 2022-11-08
Payer: OTHER GOVERNMENT

## 2022-11-08 ENCOUNTER — OFFICE VISIT (OUTPATIENT)
Dept: FAMILY MEDICINE CLINIC | Age: 57
End: 2022-11-08
Payer: OTHER GOVERNMENT

## 2022-11-08 VITALS
SYSTOLIC BLOOD PRESSURE: 119 MMHG | HEIGHT: 66 IN | DIASTOLIC BLOOD PRESSURE: 73 MMHG | HEART RATE: 100 BPM | BODY MASS INDEX: 29.73 KG/M2 | WEIGHT: 185 LBS | TEMPERATURE: 97 F | OXYGEN SATURATION: 97 % | RESPIRATION RATE: 16 BRPM

## 2022-11-08 DIAGNOSIS — Z13.6 SCREENING FOR CARDIOVASCULAR CONDITION: ICD-10-CM

## 2022-11-08 DIAGNOSIS — R73.09 ELEVATED HEMOGLOBIN A1C: ICD-10-CM

## 2022-11-08 DIAGNOSIS — Z00.00 WELL WOMAN EXAM (NO GYNECOLOGICAL EXAM): Primary | ICD-10-CM

## 2022-11-08 DIAGNOSIS — Z23 ENCOUNTER FOR IMMUNIZATION: ICD-10-CM

## 2022-11-08 DIAGNOSIS — Z12.11 COLON CANCER SCREENING: ICD-10-CM

## 2022-11-08 DIAGNOSIS — Z01.84 IMMUNITY STATUS TESTING: ICD-10-CM

## 2022-11-08 LAB
ALBUMIN SERPL-MCNC: 3.9 G/DL (ref 3.4–5)
ALBUMIN/GLOB SERPL: 1.1 {RATIO} (ref 0.8–1.7)
ALP SERPL-CCNC: 76 U/L (ref 45–117)
ALT SERPL-CCNC: 29 U/L (ref 13–56)
ANION GAP SERPL CALC-SCNC: 6 MMOL/L (ref 3–18)
AST SERPL-CCNC: 12 U/L (ref 10–38)
BILIRUB SERPL-MCNC: 0.6 MG/DL (ref 0.2–1)
BUN SERPL-MCNC: 15 MG/DL (ref 7–18)
BUN/CREAT SERPL: 18 (ref 12–20)
CALCIUM SERPL-MCNC: 9.4 MG/DL (ref 8.5–10.1)
CHLORIDE SERPL-SCNC: 106 MMOL/L (ref 100–111)
CHOLEST SERPL-MCNC: 212 MG/DL
CO2 SERPL-SCNC: 29 MMOL/L (ref 21–32)
CREAT SERPL-MCNC: 0.82 MG/DL (ref 0.6–1.3)
EST. AVERAGE GLUCOSE BLD GHB EST-MCNC: 111 MG/DL
GLOBULIN SER CALC-MCNC: 3.6 G/DL (ref 2–4)
GLUCOSE SERPL-MCNC: 88 MG/DL (ref 74–99)
HBA1C MFR BLD: 5.5 % (ref 4.2–5.6)
HDLC SERPL-MCNC: 49 MG/DL (ref 40–60)
HDLC SERPL: 4.3 {RATIO} (ref 0–5)
LDLC SERPL CALC-MCNC: 148.6 MG/DL (ref 0–100)
LIPID PROFILE,FLP: ABNORMAL
POTASSIUM SERPL-SCNC: 3.4 MMOL/L (ref 3.5–5.5)
PROT SERPL-MCNC: 7.5 G/DL (ref 6.4–8.2)
SODIUM SERPL-SCNC: 141 MMOL/L (ref 136–145)
TRIGL SERPL-MCNC: 72 MG/DL (ref ?–150)
VLDLC SERPL CALC-MCNC: 14.4 MG/DL

## 2022-11-08 PROCEDURE — 80053 COMPREHEN METABOLIC PANEL: CPT

## 2022-11-08 PROCEDURE — 90471 IMMUNIZATION ADMIN: CPT | Performed by: FAMILY MEDICINE

## 2022-11-08 PROCEDURE — 86787 VARICELLA-ZOSTER ANTIBODY: CPT

## 2022-11-08 PROCEDURE — 36415 COLL VENOUS BLD VENIPUNCTURE: CPT

## 2022-11-08 PROCEDURE — 90686 IIV4 VACC NO PRSV 0.5 ML IM: CPT | Performed by: FAMILY MEDICINE

## 2022-11-08 PROCEDURE — 83036 HEMOGLOBIN GLYCOSYLATED A1C: CPT

## 2022-11-08 PROCEDURE — 80061 LIPID PANEL: CPT

## 2022-11-08 PROCEDURE — 99396 PREV VISIT EST AGE 40-64: CPT | Performed by: FAMILY MEDICINE

## 2022-11-08 NOTE — PROGRESS NOTES
1. \"Have you been to the ER, urgent care clinic since your last visit? Hospitalized since your last visit? \" No    2. \"Have you seen or consulted any other health care providers outside of the [address] [place] [address] since your last visit? \"  Yes, for OBGYN at [facility]      3. For patients aged 39-70: Has the patient had a colonoscopy / FIT/ Cologuard? No      If the patient is female:    4. For patients aged 41-77: Has the patient had a mammogram within the past 2 years? No      5. For patients aged 21-65: Has the patient had a pap smear? No    Sissy Byrd denies any symptoms , reactions or allergies that would exclude them from being immunized today. Risks and adverse reactions were discussed and the most current VIS was given to them along with consent signed. All questions were addressed. Patient received influenza vaccine 0.5mL in left deltoid. Tolerated well. No signs or symptoms of distress noted. Patient left office ambulatory.

## 2022-11-08 NOTE — PROGRESS NOTES
Subjective:   56 y.o. female for Well Woman Check. Her gyne and breast care is done elsewhere by her Ob-Gyne physician. She has been well; She does not recall that she ever had chicken pox; she prefers to get a varicella titer before getting a shingles vaccination arbitrarily. Patient Active Problem List    Diagnosis Date Noted    Cervical spondylosis with myelopathy 06/07/2021    Multiple thyroid nodules 01/11/2016    Nausea     Facet arthropathy     Heart murmur     Iritis      No Known Allergies  Past Medical History:   Diagnosis Date    Facet arthropathy     Heart murmur 2006    High cholesterol     Hypertension     Iritis     Low back pain     Nausea     Spinal stenosis     Thyroid disease     Goiter      Past Surgical History:   Procedure Laterality Date    HX ORTHOPAEDIC  2009, 2021    back surgery, neck surgery      Family History   Problem Relation Age of Onset    Diabetes Mother     Heart Disease Father         CAD     Social History     Tobacco Use    Smoking status: Never    Smokeless tobacco: Never   Substance Use Topics    Alcohol use: No             Specific concerns today: None. Review of Systems  As per HPI    Objective:   Blood pressure 119/73, pulse 100, temperature 97 °F (36.1 °C), temperature source Temporal, resp. rate 16, height 5' 6\" (1.676 m), weight 185 lb (83.9 kg), SpO2 97 %.    Physical Examination:   General appearance - alert, well appearing, and in no distress  Ears - bilateral TM's and external ear canals normal  Neck - supple, no significant adenopathy  Lymphatics - no palpable lymphadenopathy, no hepatosplenomegaly  Chest - clear to auscultation, no wheezes, rales or rhonchi, symmetric air entry  Heart - normal rate, regular rhythm, normal S1, S2, no murmurs, rubs, clicks or gallops  Abdomen - soft, nontender, nondistended, no masses or organomegaly  Extremities - no pedal edema noted  Skin - normal coloration and turgor, no rashes, no suspicious skin lesions noted Assessment/Plan:         ICD-10-CM ICD-9-CM    1. Well woman exam (no gynecological exam)  Z00.00 V70.0     [V70.0]      2. Screening for cardiovascular condition  Z13.6 V81.2 LIPID PANEL      METABOLIC PANEL, COMPREHENSIVE      3. Immunity status testing  Z01.84 V72.61 VZV AB, IGG      4. Elevated hemoglobin A1c  R73.09 790.29 HEMOGLOBIN A1C WITH EAG      5. Colon cancer screening  Z12.11 V76.51 COLOGUARD TEST (FECAL DNA COLORECTAL CANCER SCREENING)      6. Encounter for immunization  Z23 V03.89 INFLUENZA, FLUARIX, FLULAVAL, FLUZONE (AGE 6 MO+), AFLURIA(AGE 3Y+) IM, PF, 0.5 ML      As above  Patient stable  Labs as ordered  Follow-up and Dispositions    Return in about 6 months (around 5/8/2023) for htn, Chol. This has been fully explained to the patient, who indicates understanding.

## 2022-11-10 LAB — VZV IGG SER IA-ACNC: 2227 INDEX

## 2022-11-15 ENCOUNTER — TRANSCRIBE ORDER (OUTPATIENT)
Dept: SCHEDULING | Age: 57
End: 2022-11-15

## 2022-11-15 DIAGNOSIS — L04.1 ACUTE LYMPHADENITIS OF TRUNK: Primary | ICD-10-CM

## 2022-11-25 ENCOUNTER — HOSPITAL ENCOUNTER (OUTPATIENT)
Dept: CT IMAGING | Age: 57
Discharge: HOME | End: 2022-11-25
Attending: OTOLARYNGOLOGY
Payer: OTHER GOVERNMENT

## 2022-11-25 ENCOUNTER — HOSPITAL ENCOUNTER (OUTPATIENT)
Dept: ULTRASOUND IMAGING | Age: 57
Discharge: HOME | End: 2022-11-25
Attending: OTOLARYNGOLOGY
Payer: OTHER GOVERNMENT

## 2022-11-25 ENCOUNTER — HOSPITAL ENCOUNTER (OUTPATIENT)
Dept: LAB | Age: 57
Discharge: HOME | End: 2022-11-25

## 2022-11-25 DIAGNOSIS — L04.1 ACUTE LYMPHADENITIS OF TRUNK: ICD-10-CM

## 2022-11-25 LAB
T4 FREE SERPL-MCNC: 0.8 NG/DL (ref 0.7–1.5)
TSH SERPL DL<=0.05 MIU/L-ACNC: 0.57 UIU/ML (ref 0.36–3.74)

## 2022-11-25 PROCEDURE — 36415 COLL VENOUS BLD VENIPUNCTURE: CPT

## 2022-11-25 PROCEDURE — 76536 US EXAM OF HEAD AND NECK: CPT

## 2022-11-25 PROCEDURE — 84439 ASSAY OF FREE THYROXINE: CPT

## 2022-11-25 PROCEDURE — 70491 CT SOFT TISSUE NECK W/DYE: CPT

## 2022-11-25 PROCEDURE — 74011000636 HC RX REV CODE- 636: Performed by: OTOLARYNGOLOGY

## 2022-11-25 RX ADMIN — IOPAMIDOL 80 ML: 612 INJECTION, SOLUTION INTRAVENOUS at 10:43

## 2023-01-06 ENCOUNTER — APPOINTMENT (OUTPATIENT)
Dept: GENERAL RADIOLOGY | Age: 58
End: 2023-01-06
Attending: EMERGENCY MEDICINE
Payer: OTHER GOVERNMENT

## 2023-01-06 ENCOUNTER — HOSPITAL ENCOUNTER (EMERGENCY)
Age: 58
Discharge: HOME | End: 2023-01-06
Attending: EMERGENCY MEDICINE
Payer: OTHER GOVERNMENT

## 2023-01-06 VITALS
TEMPERATURE: 100 F | DIASTOLIC BLOOD PRESSURE: 78 MMHG | RESPIRATION RATE: 16 BRPM | OXYGEN SATURATION: 100 % | HEART RATE: 100 BPM | WEIGHT: 178 LBS | HEIGHT: 66 IN | SYSTOLIC BLOOD PRESSURE: 139 MMHG | BODY MASS INDEX: 28.61 KG/M2

## 2023-01-06 DIAGNOSIS — U07.1 COVID-19: Primary | ICD-10-CM

## 2023-01-06 LAB
COVID-19 RAPID TEST, COVR: DETECTED
FLUAV AG NPH QL IA: NEGATIVE
FLUBV AG NOSE QL IA: NEGATIVE
SOURCE, COVRS: NORMAL

## 2023-01-06 PROCEDURE — 99283 EMERGENCY DEPT VISIT LOW MDM: CPT

## 2023-01-06 PROCEDURE — 71046 X-RAY EXAM CHEST 2 VIEWS: CPT

## 2023-01-06 PROCEDURE — 87635 SARS-COV-2 COVID-19 AMP PRB: CPT

## 2023-01-06 PROCEDURE — 87804 INFLUENZA ASSAY W/OPTIC: CPT

## 2023-01-06 RX ORDER — ONDANSETRON 4 MG/1
4 TABLET, FILM COATED ORAL
Qty: 20 TABLET | Refills: 0 | Status: SHIPPED | OUTPATIENT
Start: 2023-01-06

## 2023-01-06 NOTE — ED PROVIDER NOTES
The patient is a 42-year-old woman with past medical history significant for hyperlipidemia and hypertension, who presents to the ED today with cough and flu symptoms. She is also having chest congestion and a runny nose along with a headache. She has a low-grade fever. She denies shortness of breath, sore throat, nausea, vomiting or diarrhea.        Past Medical History:   Diagnosis Date    Facet arthropathy     Heart murmur 2006    High cholesterol     Hypertension     Iritis     Low back pain     Nausea     Spinal stenosis     Thyroid disease     Goiter        Past Surgical History:   Procedure Laterality Date    HX ORTHOPAEDIC  2009, 2021    back surgery, neck surgery          Family History:   Problem Relation Age of Onset    Diabetes Mother     Heart Disease Father         CAD       Social History     Socioeconomic History    Marital status:      Spouse name: Not on file    Number of children: Not on file    Years of education: Not on file    Highest education level: Not on file   Occupational History    Not on file   Tobacco Use    Smoking status: Never    Smokeless tobacco: Never   Vaping Use    Vaping Use: Never used   Substance and Sexual Activity    Alcohol use: No    Drug use: No    Sexual activity: Not on file     Comment:    Other Topics Concern     Service Not Asked    Blood Transfusions Not Asked    Caffeine Concern Yes     Comment: 4 oz three times a week    Occupational Exposure Not Asked    Hobby Hazards Not Asked    Sleep Concern Not Asked    Stress Concern Not Asked    Weight Concern Not Asked    Special Diet Not Asked    Back Care Not Asked    Exercise Yes     Comment: walks 30 minutes 3 to 4 times a week or 5,000 steps a day     Bike Helmet Not Asked    Seat Belt Yes    Self-Exams Not Asked   Social History Narrative    Not on file     Social Determinants of Health     Financial Resource Strain: Low Risk     Difficulty of Paying Living Expenses: Not hard at all   Food Insecurity: No Food Insecurity    Worried About Running Out of Food in the Last Year: Never true    Ran Out of Food in the Last Year: Never true   Transportation Needs: Not on file   Physical Activity: Not on file   Stress: Not on file   Social Connections: Not on file   Intimate Partner Violence: Not on file   Housing Stability: Not on file         ALLERGIES: Patient has no known allergies. Review of Systems   All other systems reviewed and are negative. Vitals:    01/06/23 0938   BP: 139/78   Pulse: 100   Resp: 16   Temp: 100 °F (37.8 °C)   SpO2: 100%   Weight: 80.7 kg (178 lb)   Height: 5' 6\" (1.676 m)            Physical Exam  Vitals and nursing note reviewed. Constitutional:       Appearance: Normal appearance. HENT:      Head: Normocephalic and atraumatic. Right Ear: External ear normal.      Left Ear: External ear normal.      Nose: Nose normal.      Mouth/Throat:      Mouth: Mucous membranes are moist.      Pharynx: Oropharynx is clear. Eyes:      Extraocular Movements: Extraocular movements intact. Conjunctiva/sclera: Conjunctivae normal.      Pupils: Pupils are equal, round, and reactive to light. Cardiovascular:      Rate and Rhythm: Normal rate and regular rhythm. Pulses: Normal pulses. Heart sounds: Normal heart sounds. Pulmonary:      Effort: Pulmonary effort is normal.      Breath sounds: Normal breath sounds. Abdominal:      General: Abdomen is flat. Bowel sounds are normal.      Palpations: Abdomen is soft. Musculoskeletal:         General: Normal range of motion. Cervical back: Normal range of motion and neck supple. Skin:     General: Skin is warm and dry. Capillary Refill: Capillary refill takes less than 2 seconds. Neurological:      General: No focal deficit present. Mental Status: She is alert and oriented to person, place, and time.    Psychiatric:         Mood and Affect: Mood normal. Behavior: Behavior normal.         Thought Content: Thought content normal.         Judgment: Judgment normal.      Recent Results (from the past 12 hour(s))   INFLUENZA A & B AG (RAPID TEST)    Collection Time: 01/06/23  9:40 AM   Result Value Ref Range    Influenza A Antigen Negative NEG      Influenza B Antigen Negative NEG     COVID-19 RAPID TEST    Collection Time: 01/06/23  9:40 AM   Result Value Ref Range    Specimen source Nasopharyngeal      COVID-19 rapid test Detected       XR CHEST PA LAT   Final Result   No radiographic evidence of acute cardiopulmonary process. Medical Decision Making  The patient is a 56-year-old woman with past medical history significant for hypertension and hyperlipidemia, who presents to the ED today with cough and flu symptoms. Her flu is negative. Her COVID is positive. Her chest x-ray is negative. She is not hypoxic and is very nontoxic appearing. She will be discharged home with a prescription for Paxlovid and will be advised to follow-up with her primary care physician in approximately 1 week. She has also been advised to quarantine for 5 days. Return precautions have been given. Amount and/or Complexity of Data Reviewed  Labs: ordered. Radiology: ordered. Risk  Prescription drug management.            Procedures

## 2023-01-06 NOTE — Clinical Note
[address] [facility] EMERGENCY DEPT  [address] [address] [phone]    Work/School Note    Date: 1/6/2023     To Whom It May concern:    Shiv Jang was evaluated by the following provider(s):  Attending Provider: Dominga Berry [address] [place] [address] virus is suspected. Per the CDC guidelines we recommend home isolation until the following conditions are all met:    1. At least five days have passed since symptoms first appeared and/or had a close exposure,   2. After home isolation for five days, wearing a mask around others for the next five days,  3. At least 24 have passed since last fever without the use of fever-reducing medications and  4.  Symptoms (eg cough, shortness of breath) have improved      Sincerely,          Grazyna Langston MD

## 2023-01-19 ENCOUNTER — VIRTUAL VISIT (OUTPATIENT)
Dept: FAMILY MEDICINE CLINIC | Age: 58
End: 2023-01-19
Payer: OTHER GOVERNMENT

## 2023-01-19 DIAGNOSIS — U07.1 COVID-19: Primary | ICD-10-CM

## 2023-01-19 RX ORDER — BENZONATATE 100 MG/1
100 CAPSULE ORAL
Qty: 20 CAPSULE | Refills: 0 | Status: SHIPPED | OUTPATIENT
Start: 2023-01-19

## 2023-01-19 NOTE — PROGRESS NOTES
Holli Scott (: 1965) is a 56 y.o. female, established patient, here for evaluation of the following chief complaint(s):   ED Follow-up (Seen at [facility] ER on 2023 for cold symptoms. Came back positive for COVID )           Sissy Christy, was evaluated through a synchronous (real-time) audio-video encounter. The patient (or guardian if applicable) is aware that this is a billable service, which includes applicable co-pays. This Virtual Visit was conducted with patient's (and/or legal guardian's) consent. The visit was conducted pursuant to the emergency declaration under the [address] [place] [address] authority and the [employer] General Act. Patient identification was verified, and a caregiver was present when appropriate. The patient was located at: Home: [address]  The provider was located at: Facility (Appt Department): [address]  [address]       An [address] [facility] was used to authenticate this note.   -- Severa Destine

## 2023-01-20 RX ORDER — SIMVASTATIN 40 MG/1
TABLET, FILM COATED ORAL
Qty: 90 TABLET | Refills: 3 | Status: SHIPPED | OUTPATIENT
Start: 2023-01-20

## 2023-01-20 RX ORDER — HYDROCHLOROTHIAZIDE 25 MG/1
TABLET ORAL
Qty: 90 TABLET | Refills: 3 | Status: SHIPPED | OUTPATIENT
Start: 2023-01-20

## 2023-01-20 NOTE — PROGRESS NOTES
Aleks La is a 56 y.o. female who was seen by synchronous (real-time) audio-video technology on 1/19/2023 for ED Follow-up (Seen at [facility] ER on 1-6-2023 for cold symptoms. Came back positive for COVID )        Assessment & Plan:   Diagnoses and all orders for this visit:    1. COVID-19    Other orders  -     benzonatate (Tessalon Perles) 100 mg capsule; Take 1 Capsule by mouth three (3) times daily as needed for Cough. As above  Patient with residual cough  Tessalon as per orders  [facility] of liquids  Follow-up and Dispositions    Return if symptoms worsen or fail to improve. This has been fully explained to the patient, who indicates understanding. AVS is accessible thru mychart and pt has been advised of same. Subjective:     Patient was diagnosed with COVID about 2 weeks ago. Symptoms began with a cough that worsened. She also developed fever. She went to [facility] emergency department. She felt feverish. Patient was treated with Paxlovid. She had some nausea. She has residual cough that is dry in nature. Prior to Admission medications    Medication Sig Start Date End Date Taking? Authorizing Provider   benzonatate (Tessalon Perles) 100 mg capsule Take 1 Capsule by mouth three (3) times daily as needed for Cough. 1/19/23  Yes Maddy Gomez MD   simvastatin (ZOCOR) 40 mg tablet take 1 tablet by mouth nightly 1/14/22  Yes Maddy Gomez MD   hydroCHLOROthiazide (HYDRODIURIL) 25 mg tablet take 1 tablet by mouth once daily 1/14/22  Yes Maddy Gomez MD   MULTIVITAMINS (MULTI-VITAMIN PO) Take 1 Tab by mouth daily. Yes Provider, Historical   ondansetron hcl (Zofran) 4 mg tablet Take 1 Tablet by mouth every eight (8) hours as needed for Nausea or Vomiting. Patient not taking: Reported on 1/19/2023 1/6/23   Courtney Miller MD   medroxyPROGESTERone (DEPO-PROVERA) 150 mg/mL syrg 150 mg by IntraMUSCular route every three (3) months.   Patient not taking: Reported on 11/8/2022    Provider, Historical     Patient Active Problem List    Diagnosis Date Noted    Cervical spondylosis with myelopathy 06/07/2021    Multiple thyroid nodules 01/11/2016    Nausea     Facet arthropathy     Heart murmur     Iritis      No Known Allergies  Past Medical History:   Diagnosis Date    Facet arthropathy     Heart murmur 2006    High cholesterol     Hypertension     Iritis     Low back pain     Nausea     Spinal stenosis     Thyroid disease     Goiter      Past Surgical History:   Procedure Laterality Date    HX ORTHOPAEDIC  2009, 2021    back surgery, neck surgery      Family History   Problem Relation Age of Onset    Diabetes Mother     Heart Disease Father         CAD     Social History     Tobacco Use    Smoking status: Never    Smokeless tobacco: Never   Substance Use Topics    Alcohol use: No       ROS as per HPI    Objective:   No flowsheet data found. General: alert, cooperative, no distress   Mental  status: normal mood, behavior, speech, dress, motor activity, and thought processes, able to follow commands   HENT: NCAT   Neck: no visualized mass   Resp: no respiratory distress   Neuro: no gross deficits   Skin: no discoloration or lesions of concern on visible areas   Psychiatric: normal affect, consistent with stated mood, no evidence of hallucinations     Additional exam findings: none      We discussed the expected course, resolution and complications of the diagnosis(es) in detail. Medication risks, benefits, costs, interactions, and alternatives were discussed as indicated. I advised her to contact the office if her condition worsens, changes or fails to improve as anticipated. She expressed understanding with the diagnosis(es) and plan. Brook Mcdonald, was evaluated through a synchronous (real-time) audio-video encounter. The patient (or guardian if applicable) is aware that this is a billable service, which includes applicable co-pays.  This Virtual Visit was conducted with patient's (and/or legal guardian's) consent. The visit was conducted pursuant to the emergency declaration under the [address] [place] [address] and the [employer] Act. Patient identification was verified, and a caregiver was present when appropriate.   The patient was located at: Home:  [address]  The provider was located at: Home: Talya Huitron MD

## 2023-03-13 ENCOUNTER — HOSPITAL ENCOUNTER (OUTPATIENT)
Facility: HOSPITAL | Age: 58
Discharge: HOME | End: 2023-03-16
Payer: OTHER GOVERNMENT

## 2023-03-13 DIAGNOSIS — E04.1 THYROID NODULE: ICD-10-CM

## 2023-03-13 PROCEDURE — 10005 FNA BX W/US GDN 1ST LES: CPT

## 2023-03-13 PROCEDURE — 88172 CYTP DX EVAL FNA 1ST EA SITE: CPT

## 2023-03-13 PROCEDURE — 88173 CYTOPATH EVAL FNA REPORT: CPT

## 2023-03-13 PROCEDURE — 88305 TISSUE EXAM BY PATHOLOGIST: CPT

## 2023-03-13 PROCEDURE — 88177 CYTP FNA EVAL EA ADDL: CPT

## 2023-03-13 NOTE — PROCEDURES
RADIOLOGY POST PROCEDURE NOTE     March 13, 2023       5:08 PM     Preoperative Diagnosis:   Left thyroid lobe nodule    Postoperative Diagnosis:  Same. :  Dr. Kwabena Pool    Assistant:  None. Type of Anesthesia: 1% local lidocaine. Procedure/Description: Image guided left thyroid lobe f nodule fine-needle aspiration biopsy. Findings:   No bleeding. Estimated blood Loss: Minimal    Specimen Removed:   Yes    Blood transfusions:  None. Implants:  None. Complications: None    Condition: Stable    Discharge Plan: Discharge home if stable and no bleeding. Resume blood thinners if any in 24 hours.     Alin Urena MD

## 2023-05-24 ENCOUNTER — HOSPITAL ENCOUNTER (OUTPATIENT)
Facility: HOSPITAL | Age: 58
Setting detail: SPECIMEN
Discharge: HOME | End: 2023-05-27
Payer: OTHER GOVERNMENT

## 2023-05-24 ENCOUNTER — OFFICE VISIT (OUTPATIENT)
Age: 58
End: 2023-05-24
Payer: OTHER GOVERNMENT

## 2023-05-24 VITALS
SYSTOLIC BLOOD PRESSURE: 137 MMHG | HEART RATE: 96 BPM | HEIGHT: 66 IN | WEIGHT: 182.8 LBS | BODY MASS INDEX: 29.38 KG/M2 | TEMPERATURE: 98.4 F | OXYGEN SATURATION: 97 % | RESPIRATION RATE: 16 BRPM | DIASTOLIC BLOOD PRESSURE: 85 MMHG

## 2023-05-24 DIAGNOSIS — R73.9 HYPERGLYCEMIA, UNSPECIFIED: ICD-10-CM

## 2023-05-24 DIAGNOSIS — I10 ESSENTIAL (PRIMARY) HYPERTENSION: Primary | ICD-10-CM

## 2023-05-24 DIAGNOSIS — E78.00 PURE HYPERCHOLESTEROLEMIA, UNSPECIFIED: ICD-10-CM

## 2023-05-24 LAB
ALBUMIN SERPL-MCNC: 4 G/DL (ref 3.4–5)
ALBUMIN/GLOB SERPL: 1.1 (ref 0.8–1.7)
ALP SERPL-CCNC: 71 U/L (ref 45–117)
ALT SERPL-CCNC: 32 U/L (ref 13–56)
ANION GAP SERPL CALC-SCNC: 4 MMOL/L (ref 3–18)
AST SERPL-CCNC: 20 U/L (ref 10–38)
BILIRUB SERPL-MCNC: 0.5 MG/DL (ref 0.2–1)
BUN SERPL-MCNC: 11 MG/DL (ref 7–18)
BUN/CREAT SERPL: 13 (ref 12–20)
CALCIUM SERPL-MCNC: 9.7 MG/DL (ref 8.5–10.1)
CHLORIDE SERPL-SCNC: 105 MMOL/L (ref 100–111)
CHOLEST SERPL-MCNC: 205 MG/DL
CO2 SERPL-SCNC: 31 MMOL/L (ref 21–32)
CREAT SERPL-MCNC: 0.83 MG/DL (ref 0.6–1.3)
EST. AVERAGE GLUCOSE BLD GHB EST-MCNC: 117 MG/DL
GLOBULIN SER CALC-MCNC: 3.5 G/DL (ref 2–4)
GLUCOSE SERPL-MCNC: 101 MG/DL (ref 74–99)
HBA1C MFR BLD: 5.7 % (ref 4.2–5.6)
HDLC SERPL-MCNC: 53 MG/DL (ref 40–60)
HDLC SERPL: 3.9 (ref 0–5)
LDLC SERPL CALC-MCNC: 137.8 MG/DL (ref 0–100)
LIPID PANEL: ABNORMAL
POTASSIUM SERPL-SCNC: 3.5 MMOL/L (ref 3.5–5.5)
PROT SERPL-MCNC: 7.5 G/DL (ref 6.4–8.2)
SODIUM SERPL-SCNC: 140 MMOL/L (ref 136–145)
TRIGL SERPL-MCNC: 71 MG/DL
VLDLC SERPL CALC-MCNC: 14.2 MG/DL

## 2023-05-24 PROCEDURE — 99214 OFFICE O/P EST MOD 30 MIN: CPT | Performed by: FAMILY MEDICINE

## 2023-05-24 PROCEDURE — 3079F DIAST BP 80-89 MM HG: CPT | Performed by: FAMILY MEDICINE

## 2023-05-24 PROCEDURE — 80053 COMPREHEN METABOLIC PANEL: CPT

## 2023-05-24 PROCEDURE — 3075F SYST BP GE 130 - 139MM HG: CPT | Performed by: FAMILY MEDICINE

## 2023-05-24 PROCEDURE — 83036 HEMOGLOBIN GLYCOSYLATED A1C: CPT

## 2023-05-24 PROCEDURE — 80061 LIPID PANEL: CPT

## 2023-05-24 PROCEDURE — 36415 COLL VENOUS BLD VENIPUNCTURE: CPT

## 2023-05-24 RX ORDER — MULTIVITAMIN,THERAPEUTIC
1 TABLET ORAL DAILY
COMMUNITY

## 2023-05-24 SDOH — ECONOMIC STABILITY: HOUSING INSECURITY
IN THE LAST 12 MONTHS, WAS THERE A TIME WHEN YOU DID NOT HAVE A STEADY PLACE TO SLEEP OR SLEPT IN A SHELTER (INCLUDING NOW)?: NO

## 2023-05-24 SDOH — ECONOMIC STABILITY: FOOD INSECURITY: WITHIN THE PAST 12 MONTHS, YOU WORRIED THAT YOUR FOOD WOULD RUN OUT BEFORE YOU GOT MONEY TO BUY MORE.: NEVER TRUE

## 2023-05-24 SDOH — ECONOMIC STABILITY: FOOD INSECURITY: WITHIN THE PAST 12 MONTHS, THE FOOD YOU BOUGHT JUST DIDN'T LAST AND YOU DIDN'T HAVE MONEY TO GET MORE.: NEVER TRUE

## 2023-05-24 SDOH — ECONOMIC STABILITY: INCOME INSECURITY: HOW HARD IS IT FOR YOU TO PAY FOR THE VERY BASICS LIKE FOOD, HOUSING, MEDICAL CARE, AND HEATING?: NOT HARD AT ALL

## 2023-05-24 ASSESSMENT — PATIENT HEALTH QUESTIONNAIRE - PHQ9
SUM OF ALL RESPONSES TO PHQ QUESTIONS 1-9: 0
SUM OF ALL RESPONSES TO PHQ QUESTIONS 1-9: 0
2. FEELING DOWN, DEPRESSED OR HOPELESS: 0
SUM OF ALL RESPONSES TO PHQ QUESTIONS 1-9: 0
1. LITTLE INTEREST OR PLEASURE IN DOING THINGS: 0
SUM OF ALL RESPONSES TO PHQ9 QUESTIONS 1 & 2: 0
SUM OF ALL RESPONSES TO PHQ QUESTIONS 1-9: 0

## 2023-05-24 NOTE — PROGRESS NOTES
1. \"Have you been to the ER, urgent care clinic since your last visit? Hospitalized since your last visit? \" No    2. \"Have you seen or consulted any other health care providers outside of the [address] [place] [address] since your last visit? \" No     3. For patients aged 39-70: Has the patient had a colonoscopy / FIT/ Cologuard? Yes - Care Gap present. Most recent result on file      If the patient is female:    4. For patients aged 41-77: Has the patient had a mammogram within the past 2 years? Yes - Care Gap present. Rooming MA/LPN to request most recent results      5. For patients aged 21-65: Has the patient had a pap smear? Yes - Care Gap present.  Rooming MA/LPN to request most recent results
oz (82.9 kg)   11/08/22 185 lb (83.9 kg)   09/13/21 172 lb 3.2 oz (78.1 kg)       Varicella titer is + ; Patient declines a shingles vaccine at this time    No flowsheet data found. PMH,  Meds, Allergies, Family History, Social history reviewed      Current Outpatient Medications   Medication Sig Dispense Refill    Multiple Vitamin (THERA/BETA-CAROTENE) TABS Take 1 tablet by mouth daily      hydroCHLOROthiazide (HYDRODIURIL) 25 MG tablet take 1 tablet by mouth once daily      simvastatin (ZOCOR) 40 MG tablet take 1 tablet by mouth nightly       No current facility-administered medications for this visit. No Known Allergies               Review of Systems  as per  HPI        /85 (Site: Left Upper Arm, Position: Sitting, Cuff Size: Medium Adult)   Pulse 96   Temp 98.4 °F (36.9 °C) (Temporal)   Resp 16   Ht 5' 6\" (1.676 m)   Wt 182 lb 12.8 oz (82.9 kg) Comment: with shoes  LMP  (LMP Unknown)   SpO2 97%   BMI 29.50 kg/m²   General appearance: alert, cooperative, no distress, appears stated age  Neck: supple, symmetrical, trachea midline, no adenopathy, thyroid: not enlarged, symmetric, no tenderness/mass/nodules, no carotid bruit and no JVD  Lungs: clear to auscultation bilaterally  Heart: regular rate and rhythm, S1, S2 normal, no murmur, click, rub or gallop    Extremities: extremities normal, atraumatic, no cyanosis or edema      Assessment/Plan:    1. Essential (primary) hypertension  stable    2. Pure hypercholesterolemia, unspecified  Moderate controlles    3. Hyperglycemia, unspecified  Stable a1c     As above  Pt stable  Labs as ordered  [facility] and pt has been advised of same. Return in about 6 months (around 11/24/2023) for well exam.  This has been fully explained to the patient, who indicates understanding.         Colleen Longo MD

## 2023-11-27 ENCOUNTER — OFFICE VISIT (OUTPATIENT)
Age: 58
End: 2023-11-27
Payer: OTHER GOVERNMENT

## 2023-11-27 VITALS
TEMPERATURE: 97.5 F | HEART RATE: 90 BPM | SYSTOLIC BLOOD PRESSURE: 129 MMHG | BODY MASS INDEX: 28.8 KG/M2 | DIASTOLIC BLOOD PRESSURE: 82 MMHG | RESPIRATION RATE: 16 BRPM | OXYGEN SATURATION: 96 % | WEIGHT: 179.2 LBS | HEIGHT: 66 IN

## 2023-11-27 DIAGNOSIS — R73.9 HYPERGLYCEMIA, UNSPECIFIED: ICD-10-CM

## 2023-11-27 DIAGNOSIS — I10 ESSENTIAL (PRIMARY) HYPERTENSION: ICD-10-CM

## 2023-11-27 DIAGNOSIS — E78.00 PURE HYPERCHOLESTEROLEMIA, UNSPECIFIED: ICD-10-CM

## 2023-11-27 DIAGNOSIS — Z00.00 WELL WOMAN EXAM (NO GYNECOLOGICAL EXAM): Primary | ICD-10-CM

## 2023-11-27 PROCEDURE — 99396 PREV VISIT EST AGE 40-64: CPT | Performed by: FAMILY MEDICINE

## 2023-11-27 PROCEDURE — 3074F SYST BP LT 130 MM HG: CPT | Performed by: FAMILY MEDICINE

## 2023-11-27 PROCEDURE — 3079F DIAST BP 80-89 MM HG: CPT | Performed by: FAMILY MEDICINE

## 2023-11-27 RX ORDER — HYDROCHLOROTHIAZIDE 25 MG/1
25 TABLET ORAL DAILY
Qty: 90 TABLET | Refills: 3 | Status: SHIPPED | OUTPATIENT
Start: 2023-11-27

## 2023-11-27 RX ORDER — SIMVASTATIN 40 MG
40 TABLET ORAL NIGHTLY
Qty: 90 TABLET | Refills: 3 | Status: SHIPPED | OUTPATIENT
Start: 2023-11-27

## 2023-11-27 NOTE — PROGRESS NOTES
1. \"Have you been to the ER, urgent care clinic since your last visit? Hospitalized since your last visit? \" No    2. \"Have you seen or consulted any other health care providers outside of the [address] [place] [address] since your last visit? \"  Yes, [facility]       3. For patients aged 43-73: Has the patient had a colonoscopy / FIT/ Cologuard? Yes - Care Gap present. Most recent result on file      If the patient is female:    4. For patients aged 43-66: Has the patient had a mammogram within the past 2 years? Yes - Care Gap present. Rooming MA/LPN to request most recent results      5. For patients aged 21-65: Has the patient had a pap smear? Yes - Care Gap present.  Rooming MA/LPN to request most recent results
screen  Completed    Hepatitis A vaccine  Aged Out    Hib vaccine  Aged Out    Meningococcal (ACWY) vaccine  Aged Out    Pneumococcal 0-64 years Vaccine  Aged Out    Diabetes screen  Discontinued     Recommendations for Preventive Services Due: see orders and patient instructions/AVS.    Return in about 6 months (around 5/27/2024) for htn, PREDIABETES, cholesterol.

## 2024-02-20 ENCOUNTER — HOSPITAL ENCOUNTER (OUTPATIENT)
Facility: HOSPITAL | Age: 59
Discharge: HOME | End: 2024-02-23
Attending: OTOLARYNGOLOGY
Payer: OTHER GOVERNMENT

## 2024-02-20 DIAGNOSIS — E04.2 NONTOXIC MULTINODULAR GOITER: ICD-10-CM

## 2024-02-20 LAB — CREAT UR-MCNC: 0.8 MG/DL (ref 0.6–1.3)

## 2024-02-20 PROCEDURE — 70491 CT SOFT TISSUE NECK W/DYE: CPT

## 2024-02-20 PROCEDURE — 82565 ASSAY OF CREATININE: CPT

## 2024-02-20 PROCEDURE — 6360000004 HC RX CONTRAST MEDICATION: Performed by: OTOLARYNGOLOGY

## 2024-02-20 RX ADMIN — IOPAMIDOL 80 ML: 612 INJECTION, SOLUTION INTRAVENOUS at 09:32

## 2024-02-22 RX ORDER — SIMVASTATIN 40 MG
40 TABLET ORAL NIGHTLY
Qty: 90 TABLET | Refills: 3 | Status: SHIPPED | OUTPATIENT
Start: 2024-02-22

## 2024-02-22 NOTE — TELEPHONE ENCOUNTER
Last Visit: 11/24/2023   Next Appointment: 04/01/2024    Requested Prescriptions     Pending Prescriptions Disp Refills    simvastatin (ZOCOR) 40 MG tablet [Pharmacy Med Name: SIMVASTATIN 40 MG TABLET] 90 tablet 3     Sig: take 1 tablet by mouth every evening

## 2024-04-01 NOTE — PROGRESS NOTES
Instructions for your surgery at [facility]      Today's Date:  4/1/2024      Patient's Name:  Stone Guan           Surgery Date:  April 15              Please enter the main entrance of the hospital and check-in at the  located in the lobby. Once checked in at the , you will take the elevators to the second floor, and report to the waiting room on the left. The room will say Procedure Registration.    Do NOT eat or drink anything, including candy, gum, or ice chips after midnight prior to your surgery, unless you have specific instructions from your surgeon or anesthesia provider to do so.  Brush your teeth before coming to the hospital. You may swish with water, but do not swallow.  No smoking/Vaping/E-Cigarettes 24 hours prior to the day of surgery.  No alcohol 24 hours prior to the day of surgery.  No recreational drugs for one week prior to the day of surgery.  Bring Photo ID, Insurance information, and Co-pay if required on day of surgery.  Bring in pertinent legal documents, such as, Medical Power of , DNR, Advance Directive, etc.  Leave all valuables, including money/purse, at home.  Remove all jewelry, including ALL body piercings, nail polish, acrylic nails, and makeup (including mascara); no lotions, powders, deodorant, or perfume/cologne/after shave on the skin.  Follow instruction for Hibiclens washes and CHG wipes from surgeon's office.   Glasses and dentures may be worn to the hospital. They must be removed prior to surgery. Please bring case/container for glasses or dentures.   Contact lenses should not be worn on day of surgery.   Call your doctor's office if symptoms of a cold or illness develop within 24-48 hours prior to your surgery.  Call your doctor's office if you have any questions concerning insurance or co-pays.  15. AN ADULT (relative or friend 18 years or older) MUST DRIVE YOU HOME AFTER YOUR SURGERY.  16. Please make

## 2024-04-02 ENCOUNTER — HOSPITAL ENCOUNTER (OUTPATIENT)
Facility: HOSPITAL | Age: 59
Discharge: HOME | End: 2024-04-05
Payer: OTHER GOVERNMENT

## 2024-04-02 LAB
ANION GAP SERPL CALC-SCNC: 5 MMOL/L (ref 3–18)
BASOPHILS # BLD: 0 K/UL (ref 0–0.1)
BASOPHILS NFR BLD: 0 % (ref 0–2)
BUN SERPL-MCNC: 14 MG/DL (ref 7–18)
BUN/CREAT SERPL: 16 (ref 12–20)
CALCIUM SERPL-MCNC: 9 MG/DL (ref 8.5–10.1)
CHLORIDE SERPL-SCNC: 104 MMOL/L (ref 100–111)
CO2 SERPL-SCNC: 31 MMOL/L (ref 21–32)
CREAT SERPL-MCNC: 0.85 MG/DL (ref 0.6–1.3)
DIFFERENTIAL METHOD BLD: NORMAL
EKG ATRIAL RATE: 86 BPM
EKG DIAGNOSIS: NORMAL
EKG P AXIS: 54 DEGREES
EKG P-R INTERVAL: 152 MS
EKG Q-T INTERVAL: 352 MS
EKG QRS DURATION: 86 MS
EKG QTC CALCULATION (BAZETT): 421 MS
EKG R AXIS: 27 DEGREES
EKG T AXIS: 42 DEGREES
EKG VENTRICULAR RATE: 86 BPM
EOSINOPHIL # BLD: 0.1 K/UL (ref 0–0.4)
EOSINOPHIL NFR BLD: 1 % (ref 0–5)
ERYTHROCYTE [DISTWIDTH] IN BLOOD BY AUTOMATED COUNT: 13.7 % (ref 11.6–14.5)
GLUCOSE SERPL-MCNC: 120 MG/DL (ref 74–99)
HCT VFR BLD AUTO: 38 % (ref 35–45)
HGB BLD-MCNC: 12 G/DL (ref 12–16)
IMM GRANULOCYTES # BLD AUTO: 0 K/UL (ref 0–0.04)
IMM GRANULOCYTES NFR BLD AUTO: 0 % (ref 0–0.5)
LYMPHOCYTES # BLD: 2.6 K/UL (ref 0.9–3.6)
LYMPHOCYTES NFR BLD: 36 % (ref 21–52)
MCH RBC QN AUTO: 28.4 PG (ref 24–34)
MCHC RBC AUTO-ENTMCNC: 31.6 G/DL (ref 31–37)
MCV RBC AUTO: 90 FL (ref 78–100)
MONOCYTES # BLD: 0.4 K/UL (ref 0.05–1.2)
MONOCYTES NFR BLD: 5 % (ref 3–10)
NEUTS SEG # BLD: 4.1 K/UL (ref 1.8–8)
NEUTS SEG NFR BLD: 57 % (ref 40–73)
NRBC # BLD: 0 K/UL (ref 0–0.01)
NRBC BLD-RTO: 0 PER 100 WBC
PLATELET # BLD AUTO: 251 K/UL (ref 135–420)
PMV BLD AUTO: 9.8 FL (ref 9.2–11.8)
POTASSIUM SERPL-SCNC: 3.6 MMOL/L (ref 3.5–5.5)
RBC # BLD AUTO: 4.22 M/UL (ref 4.2–5.3)
SODIUM SERPL-SCNC: 140 MMOL/L (ref 136–145)
WBC # BLD AUTO: 7.3 K/UL (ref 4.6–13.2)

## 2024-04-02 PROCEDURE — 93005 ELECTROCARDIOGRAM TRACING: CPT | Performed by: OTOLARYNGOLOGY

## 2024-04-02 PROCEDURE — 80048 BASIC METABOLIC PNL TOTAL CA: CPT

## 2024-04-02 PROCEDURE — 93010 ELECTROCARDIOGRAM REPORT: CPT | Performed by: INTERNAL MEDICINE

## 2024-04-02 PROCEDURE — 85025 COMPLETE CBC W/AUTO DIFF WBC: CPT

## 2024-04-02 PROCEDURE — 36415 COLL VENOUS BLD VENIPUNCTURE: CPT

## 2024-04-12 ENCOUNTER — ANESTHESIA EVENT (OUTPATIENT)
Facility: HOSPITAL | Age: 59
End: 2024-04-12
Payer: OTHER GOVERNMENT

## 2024-04-15 ENCOUNTER — ANESTHESIA (OUTPATIENT)
Facility: HOSPITAL | Age: 59
End: 2024-04-15
Payer: OTHER GOVERNMENT

## 2024-04-15 ENCOUNTER — HOSPITAL ENCOUNTER (OUTPATIENT)
Facility: HOSPITAL | Age: 59
Setting detail: OBSERVATION
Discharge: HOME | End: 2024-04-16
Attending: OTOLARYNGOLOGY | Admitting: OTOLARYNGOLOGY
Payer: OTHER GOVERNMENT

## 2024-04-15 DIAGNOSIS — E04.9 THYROID ENLARGEMENT: Primary | ICD-10-CM

## 2024-04-15 LAB
CALCIUM SERPL-MCNC: 9.3 MG/DL (ref 8.5–10.1)
PTH-INTACT SERPL-MCNC: 40.5 PG/ML (ref 18.4–88)

## 2024-04-15 PROCEDURE — 3600000002 HC SURGERY LEVEL 2 BASE: Performed by: OTOLARYNGOLOGY

## 2024-04-15 PROCEDURE — 6370000000 HC RX 637 (ALT 250 FOR IP): Performed by: OTOLARYNGOLOGY

## 2024-04-15 PROCEDURE — 6360000002 HC RX W HCPCS: Performed by: OTOLARYNGOLOGY

## 2024-04-15 PROCEDURE — G0378 HOSPITAL OBSERVATION PER HR: HCPCS

## 2024-04-15 PROCEDURE — 36415 COLL VENOUS BLD VENIPUNCTURE: CPT

## 2024-04-15 PROCEDURE — 2709999900 HC NON-CHARGEABLE SUPPLY: Performed by: OTOLARYNGOLOGY

## 2024-04-15 PROCEDURE — 6360000002 HC RX W HCPCS: Performed by: NURSE ANESTHETIST, CERTIFIED REGISTERED

## 2024-04-15 PROCEDURE — 2580000003 HC RX 258: Performed by: OTOLARYNGOLOGY

## 2024-04-15 PROCEDURE — 83970 ASSAY OF PARATHORMONE: CPT

## 2024-04-15 PROCEDURE — 7100000000 HC PACU RECOVERY - FIRST 15 MIN: Performed by: OTOLARYNGOLOGY

## 2024-04-15 PROCEDURE — 3600000012 HC SURGERY LEVEL 2 ADDTL 15MIN: Performed by: OTOLARYNGOLOGY

## 2024-04-15 PROCEDURE — 2580000003 HC RX 258: Performed by: NURSE ANESTHETIST, CERTIFIED REGISTERED

## 2024-04-15 PROCEDURE — 88307 TISSUE EXAM BY PATHOLOGIST: CPT

## 2024-04-15 PROCEDURE — 2500000003 HC RX 250 WO HCPCS: Performed by: STUDENT IN AN ORGANIZED HEALTH CARE EDUCATION/TRAINING PROGRAM

## 2024-04-15 PROCEDURE — 2500000003 HC RX 250 WO HCPCS: Performed by: NURSE ANESTHETIST, CERTIFIED REGISTERED

## 2024-04-15 PROCEDURE — 3700000000 HC ANESTHESIA ATTENDED CARE: Performed by: OTOLARYNGOLOGY

## 2024-04-15 PROCEDURE — 3700000001 HC ADD 15 MINUTES (ANESTHESIA): Performed by: OTOLARYNGOLOGY

## 2024-04-15 PROCEDURE — 6370000000 HC RX 637 (ALT 250 FOR IP): Performed by: NURSE ANESTHETIST, CERTIFIED REGISTERED

## 2024-04-15 PROCEDURE — 7100000001 HC PACU RECOVERY - ADDTL 15 MIN: Performed by: OTOLARYNGOLOGY

## 2024-04-15 RX ORDER — ONDANSETRON 2 MG/ML
INJECTION INTRAMUSCULAR; INTRAVENOUS PRN
Status: DISCONTINUED | OUTPATIENT
Start: 2024-04-15 | End: 2024-04-15 | Stop reason: SDUPTHER

## 2024-04-15 RX ORDER — SODIUM CHLORIDE, SODIUM LACTATE, POTASSIUM CHLORIDE, CALCIUM CHLORIDE 600; 310; 30; 20 MG/100ML; MG/100ML; MG/100ML; MG/100ML
INJECTION, SOLUTION INTRAVENOUS CONTINUOUS
Status: DISCONTINUED | OUTPATIENT
Start: 2024-04-15 | End: 2024-04-15 | Stop reason: HOSPADM

## 2024-04-15 RX ORDER — MIDAZOLAM HYDROCHLORIDE 1 MG/ML
INJECTION INTRAMUSCULAR; INTRAVENOUS PRN
Status: DISCONTINUED | OUTPATIENT
Start: 2024-04-15 | End: 2024-04-15 | Stop reason: SDUPTHER

## 2024-04-15 RX ORDER — METOCLOPRAMIDE HYDROCHLORIDE 5 MG/ML
10 INJECTION INTRAMUSCULAR; INTRAVENOUS
Status: DISCONTINUED | OUTPATIENT
Start: 2024-04-15 | End: 2024-04-15 | Stop reason: HOSPADM

## 2024-04-15 RX ORDER — SODIUM CHLORIDE 0.9 % (FLUSH) 0.9 %
5-40 SYRINGE (ML) INJECTION EVERY 12 HOURS SCHEDULED
Status: DISCONTINUED | OUTPATIENT
Start: 2024-04-15 | End: 2024-04-16 | Stop reason: HOSPADM

## 2024-04-15 RX ORDER — SODIUM CHLORIDE 9 MG/ML
INJECTION, SOLUTION INTRAVENOUS PRN
Status: DISCONTINUED | OUTPATIENT
Start: 2024-04-15 | End: 2024-04-16 | Stop reason: HOSPADM

## 2024-04-15 RX ORDER — ONDANSETRON 2 MG/ML
4 INJECTION INTRAMUSCULAR; INTRAVENOUS EVERY 6 HOURS PRN
Status: DISCONTINUED | OUTPATIENT
Start: 2024-04-15 | End: 2024-04-16 | Stop reason: HOSPADM

## 2024-04-15 RX ORDER — MEPERIDINE HYDROCHLORIDE 25 MG/ML
12.5 INJECTION INTRAMUSCULAR; INTRAVENOUS; SUBCUTANEOUS EVERY 5 MIN PRN
Status: DISCONTINUED | OUTPATIENT
Start: 2024-04-15 | End: 2024-04-15 | Stop reason: HOSPADM

## 2024-04-15 RX ORDER — METOPROLOL TARTRATE 1 MG/ML
2 INJECTION, SOLUTION INTRAVENOUS ONCE
Status: COMPLETED | OUTPATIENT
Start: 2024-04-15 | End: 2024-04-15

## 2024-04-15 RX ORDER — LIDOCAINE HYDROCHLORIDE 10 MG/ML
1 INJECTION, SOLUTION EPIDURAL; INFILTRATION; INTRACAUDAL; PERINEURAL
Status: DISCONTINUED | OUTPATIENT
Start: 2024-04-15 | End: 2024-04-15 | Stop reason: HOSPADM

## 2024-04-15 RX ORDER — PROPOFOL 10 MG/ML
INJECTION, EMULSION INTRAVENOUS PRN
Status: DISCONTINUED | OUTPATIENT
Start: 2024-04-15 | End: 2024-04-15 | Stop reason: SDUPTHER

## 2024-04-15 RX ORDER — ONDANSETRON 4 MG/1
4 TABLET, ORALLY DISINTEGRATING ORAL EVERY 8 HOURS PRN
Status: DISCONTINUED | OUTPATIENT
Start: 2024-04-15 | End: 2024-04-16 | Stop reason: HOSPADM

## 2024-04-15 RX ORDER — HYDROCHLOROTHIAZIDE 25 MG/1
25 TABLET ORAL DAILY
Status: DISCONTINUED | OUTPATIENT
Start: 2024-04-15 | End: 2024-04-16 | Stop reason: HOSPADM

## 2024-04-15 RX ORDER — ACETAMINOPHEN 325 MG/1
650 TABLET ORAL
Status: DISCONTINUED | OUTPATIENT
Start: 2024-04-15 | End: 2024-04-15 | Stop reason: HOSPADM

## 2024-04-15 RX ORDER — NALOXONE HYDROCHLORIDE 0.4 MG/ML
INJECTION, SOLUTION INTRAMUSCULAR; INTRAVENOUS; SUBCUTANEOUS PRN
Status: DISCONTINUED | OUTPATIENT
Start: 2024-04-15 | End: 2024-04-15 | Stop reason: HOSPADM

## 2024-04-15 RX ORDER — SODIUM CHLORIDE 0.9 % (FLUSH) 0.9 %
5-40 SYRINGE (ML) INJECTION PRN
Status: DISCONTINUED | OUTPATIENT
Start: 2024-04-15 | End: 2024-04-16 | Stop reason: HOSPADM

## 2024-04-15 RX ORDER — SODIUM CHLORIDE 0.9 % (FLUSH) 0.9 %
5-40 SYRINGE (ML) INJECTION PRN
Status: DISCONTINUED | OUTPATIENT
Start: 2024-04-15 | End: 2024-04-15 | Stop reason: HOSPADM

## 2024-04-15 RX ORDER — FENTANYL CITRATE 50 UG/ML
25 INJECTION, SOLUTION INTRAMUSCULAR; INTRAVENOUS EVERY 5 MIN PRN
Status: COMPLETED | OUTPATIENT
Start: 2024-04-15 | End: 2024-04-15

## 2024-04-15 RX ORDER — SUCCINYLCHOLINE/SOD CL,ISO/PF 100 MG/5ML
SYRINGE (ML) INTRAVENOUS PRN
Status: DISCONTINUED | OUTPATIENT
Start: 2024-04-15 | End: 2024-04-15 | Stop reason: SDUPTHER

## 2024-04-15 RX ORDER — ROCURONIUM BROMIDE 10 MG/ML
INJECTION, SOLUTION INTRAVENOUS PRN
Status: DISCONTINUED | OUTPATIENT
Start: 2024-04-15 | End: 2024-04-15 | Stop reason: SDUPTHER

## 2024-04-15 RX ORDER — DEXAMETHASONE SODIUM PHOSPHATE 4 MG/ML
INJECTION, SOLUTION INTRA-ARTICULAR; INTRALESIONAL; INTRAMUSCULAR; INTRAVENOUS; SOFT TISSUE PRN
Status: DISCONTINUED | OUTPATIENT
Start: 2024-04-15 | End: 2024-04-15 | Stop reason: SDUPTHER

## 2024-04-15 RX ORDER — FAMOTIDINE 20 MG/1
20 TABLET, FILM COATED ORAL ONCE
Status: COMPLETED | OUTPATIENT
Start: 2024-04-15 | End: 2024-04-15

## 2024-04-15 RX ORDER — DEXTROSE, SODIUM CHLORIDE, SODIUM LACTATE, POTASSIUM CHLORIDE, AND CALCIUM CHLORIDE 5; .6; .31; .03; .02 G/100ML; G/100ML; G/100ML; G/100ML; G/100ML
INJECTION, SOLUTION INTRAVENOUS CONTINUOUS
Status: DISCONTINUED | OUTPATIENT
Start: 2024-04-15 | End: 2024-04-16 | Stop reason: HOSPADM

## 2024-04-15 RX ORDER — HYDROCODONE BITARTRATE AND ACETAMINOPHEN 5; 325 MG/1; MG/1
1 TABLET ORAL EVERY 4 HOURS PRN
Status: DISCONTINUED | OUTPATIENT
Start: 2024-04-15 | End: 2024-04-16 | Stop reason: HOSPADM

## 2024-04-15 RX ORDER — FENTANYL CITRATE 50 UG/ML
INJECTION, SOLUTION INTRAMUSCULAR; INTRAVENOUS PRN
Status: DISCONTINUED | OUTPATIENT
Start: 2024-04-15 | End: 2024-04-15 | Stop reason: SDUPTHER

## 2024-04-15 RX ORDER — OXYCODONE HYDROCHLORIDE 5 MG/1
5 TABLET ORAL
Status: DISCONTINUED | OUTPATIENT
Start: 2024-04-15 | End: 2024-04-15 | Stop reason: HOSPADM

## 2024-04-15 RX ORDER — BACITRACIN ZINC AND POLYMYXIN B SULFATE 500; 1000 [USP'U]/G; [USP'U]/G
OINTMENT TOPICAL 2 TIMES DAILY
Status: DISCONTINUED | OUTPATIENT
Start: 2024-04-15 | End: 2024-04-16 | Stop reason: HOSPADM

## 2024-04-15 RX ORDER — LIDOCAINE HYDROCHLORIDE 20 MG/ML
INJECTION, SOLUTION EPIDURAL; INFILTRATION; INTRACAUDAL; PERINEURAL PRN
Status: DISCONTINUED | OUTPATIENT
Start: 2024-04-15 | End: 2024-04-15 | Stop reason: SDUPTHER

## 2024-04-15 RX ORDER — PROCHLORPERAZINE EDISYLATE 5 MG/ML
5 INJECTION INTRAMUSCULAR; INTRAVENOUS
Status: DISCONTINUED | OUTPATIENT
Start: 2024-04-15 | End: 2024-04-15 | Stop reason: HOSPADM

## 2024-04-15 RX ADMIN — SODIUM CHLORIDE, PRESERVATIVE FREE 10 ML: 5 INJECTION INTRAVENOUS at 23:18

## 2024-04-15 RX ADMIN — HYDROCHLOROTHIAZIDE 25 MG: 25 TABLET ORAL at 18:12

## 2024-04-15 RX ADMIN — FENTANYL CITRATE 25 MCG: 50 INJECTION INTRAMUSCULAR; INTRAVENOUS at 11:29

## 2024-04-15 RX ADMIN — LEVOTHYROXINE SODIUM 125 MCG: 25 TABLET ORAL at 18:12

## 2024-04-15 RX ADMIN — FIRST AID ANTIBIOTIC: 500; 10000 OINTMENT TOPICAL at 23:33

## 2024-04-15 RX ADMIN — ONDANSETRON 4 MG: 2 INJECTION INTRAMUSCULAR; INTRAVENOUS at 12:08

## 2024-04-15 RX ADMIN — FENTANYL CITRATE 25 MCG: 50 INJECTION INTRAMUSCULAR; INTRAVENOUS at 16:28

## 2024-04-15 RX ADMIN — METOPROLOL TARTRATE 2 MG: 1 INJECTION, SOLUTION INTRAVENOUS at 16:49

## 2024-04-15 RX ADMIN — FENTANYL CITRATE 25 MCG: 50 INJECTION INTRAMUSCULAR; INTRAVENOUS at 11:36

## 2024-04-15 RX ADMIN — FENTANYL CITRATE 25 MCG: 50 INJECTION INTRAMUSCULAR; INTRAVENOUS at 16:23

## 2024-04-15 RX ADMIN — LIDOCAINE HYDROCHLORIDE 100 MG: 20 INJECTION, SOLUTION EPIDURAL; INFILTRATION; INTRACAUDAL; PERINEURAL at 14:20

## 2024-04-15 RX ADMIN — FENTANYL CITRATE 25 MCG: 50 INJECTION INTRAMUSCULAR; INTRAVENOUS at 12:13

## 2024-04-15 RX ADMIN — DEXAMETHASONE SODIUM PHOSPHATE 8 MG: 4 INJECTION, SOLUTION INTRAMUSCULAR; INTRAVENOUS at 12:08

## 2024-04-15 RX ADMIN — MIDAZOLAM 2 MG: 1 INJECTION, SOLUTION INTRAMUSCULAR; INTRAVENOUS at 11:11

## 2024-04-15 RX ADMIN — SODIUM CHLORIDE, PRESERVATIVE FREE 10 ML: 5 INJECTION INTRAVENOUS at 23:33

## 2024-04-15 RX ADMIN — FENTANYL CITRATE 50 MCG: 50 INJECTION INTRAMUSCULAR; INTRAVENOUS at 11:44

## 2024-04-15 RX ADMIN — FENTANYL CITRATE 25 MCG: 50 INJECTION INTRAMUSCULAR; INTRAVENOUS at 16:10

## 2024-04-15 RX ADMIN — Medication 100 MG: at 11:21

## 2024-04-15 RX ADMIN — HYDROMORPHONE HYDROCHLORIDE 0.5 MG: 1 INJECTION, SOLUTION INTRAMUSCULAR; INTRAVENOUS; SUBCUTANEOUS at 18:12

## 2024-04-15 RX ADMIN — FAMOTIDINE 20 MG: 20 TABLET ORAL at 10:14

## 2024-04-15 RX ADMIN — FENTANYL CITRATE 25 MCG: 50 INJECTION INTRAMUSCULAR; INTRAVENOUS at 16:18

## 2024-04-15 RX ADMIN — HYDROMORPHONE HYDROCHLORIDE 0.5 MG: 1 INJECTION, SOLUTION INTRAMUSCULAR; INTRAVENOUS; SUBCUTANEOUS at 15:26

## 2024-04-15 RX ADMIN — FENTANYL CITRATE 25 MCG: 50 INJECTION INTRAMUSCULAR; INTRAVENOUS at 12:06

## 2024-04-15 RX ADMIN — ROCURONIUM BROMIDE 10 MG: 10 INJECTION, SOLUTION INTRAVENOUS at 11:21

## 2024-04-15 RX ADMIN — SODIUM CHLORIDE, SODIUM LACTATE, POTASSIUM CHLORIDE, AND CALCIUM CHLORIDE: 600; 310; 30; 20 INJECTION, SOLUTION INTRAVENOUS at 12:09

## 2024-04-15 RX ADMIN — SODIUM CHLORIDE, SODIUM LACTATE, POTASSIUM CHLORIDE, AND CALCIUM CHLORIDE: 600; 310; 30; 20 INJECTION, SOLUTION INTRAVENOUS at 10:00

## 2024-04-15 RX ADMIN — PROPOFOL 150 MG: 10 INJECTION, EMULSION INTRAVENOUS at 11:21

## 2024-04-15 RX ADMIN — SODIUM CHLORIDE, SODIUM LACTATE, POTASSIUM CHLORIDE, CALCIUM CHLORIDE AND DEXTROSE MONOHYDRATE: 5; 600; 310; 30; 20 INJECTION, SOLUTION INTRAVENOUS at 18:17

## 2024-04-15 RX ADMIN — FENTANYL CITRATE 50 MCG: 50 INJECTION INTRAMUSCULAR; INTRAVENOUS at 11:21

## 2024-04-15 RX ADMIN — LIDOCAINE HYDROCHLORIDE 60 MG: 20 INJECTION, SOLUTION EPIDURAL; INFILTRATION; INTRACAUDAL; PERINEURAL at 11:21

## 2024-04-15 RX ADMIN — HYDROMORPHONE HYDROCHLORIDE 0.5 MG: 1 INJECTION, SOLUTION INTRAMUSCULAR; INTRAVENOUS; SUBCUTANEOUS at 23:19

## 2024-04-15 ASSESSMENT — PAIN - FUNCTIONAL ASSESSMENT
PAIN_FUNCTIONAL_ASSESSMENT: ACTIVITIES ARE NOT PREVENTED

## 2024-04-15 ASSESSMENT — PAIN DESCRIPTION - ORIENTATION
ORIENTATION: ANTERIOR

## 2024-04-15 ASSESSMENT — PAIN DESCRIPTION - LOCATION
LOCATION: THROAT

## 2024-04-15 ASSESSMENT — PAIN SCALES - GENERAL
PAINLEVEL_OUTOF10: 4
PAINLEVEL_OUTOF10: 7
PAINLEVEL_OUTOF10: 0
PAINLEVEL_OUTOF10: 0
PAINLEVEL_OUTOF10: 5
PAINLEVEL_OUTOF10: 4
PAINLEVEL_OUTOF10: 4
PAINLEVEL_OUTOF10: 7
PAINLEVEL_OUTOF10: 5
PAINLEVEL_OUTOF10: 6
PAINLEVEL_OUTOF10: 6
PAINLEVEL_OUTOF10: 8

## 2024-04-15 ASSESSMENT — PAIN DESCRIPTION - PAIN TYPE
TYPE: SURGICAL PAIN
TYPE: SURGICAL PAIN

## 2024-04-15 ASSESSMENT — PAIN DESCRIPTION - DESCRIPTORS
DESCRIPTORS: SORE
DESCRIPTORS: SORE
DESCRIPTORS: TIGHTNESS
DESCRIPTORS: SORE
DESCRIPTORS: SORE

## 2024-04-15 ASSESSMENT — PAIN SCALES - WONG BAKER: WONGBAKER_NUMERICALRESPONSE: 0;4

## 2024-04-15 NOTE — BRIEF OP NOTE
Brief Postoperative Note      Patient: Stone Guan  YOB: 1965  MRN: [id number]    Date of Procedure: 4/15/2024    Pre-Op Diagnosis Codes:     * Multinodular goiter [E04.2]     * Dysphagia, unspecified type [R13.10]    Post-Op Diagnosis: Same       Procedure(s):  TOTAL THYROIDECTOMY    Surgeon(s):  Sabino Calixto MD    Assistant:  Surgical Assistant: Juan C Williamson    Anesthesia: General    Estimated Blood Loss (mL): less than 50     Complications: None    Specimens:   ID Type Source Tests Collected by Time Destination   A : total thyroid long stitch is left superior pole Tissue Thyroid SURGICAL PATHOLOGY Sabino Calixto MD 4/15/2024 1249        Implants:  * No implants in log *      Drains:   Closed/Suction Drain Superior;Midline Bulb (Active)   Site Description Clean, dry & intact 04/15/24 1653   Dressing Status Clean, dry & intact 04/15/24 1653   Drainage Appearance Bright red 04/15/24 1653   Drain Status Compressed;To bulb suction 04/15/24 1653       Findings:  Infection Present At Time Of Surgery (PATOS) (choose all levels that have infection present):  No infection present  Other Findings: Substernal goiter bilaterally difficult case due to inability to extend head  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

## 2024-04-15 NOTE — PERIOP NOTE
Patient /Family /Designee has been informed that [facility] is not responsible for patient belongings per policy and the signed [facility] Patient Agreement document.  Personal items should be sent home or checked in with security.  Patient /Family /Designee selected the following action:                            [x]  Send personal items home with a family member or friend                                                 []  Check in personal items with security, excluding clothing                            []  Maintain personal items at the bedside, against recommendation                                 by Herbie Mitchell [facility]                                   ** If patient /family /designee chooses to maintain personal items at the bedside,                                      Complete the patient belongings inventory in the EMR.

## 2024-04-15 NOTE — PROGRESS NOTES
Postop check  Patient offering no complaints  Family at bedside  PTH pending  Neck wound without any evidence of hematoma  Voice strong  Will check PTH level which was obtained several hours ago.  Results pending       Sabino Calixto

## 2024-04-15 NOTE — PROGRESS NOTES
Patient admitted to 2S post op surgery for thyroidectomy. Patient is calm and cooperative, family at bedside. Nurse assessed patient and placed ryan drain on wall suction continuous. Surgeon at bedside. Patient states she has sore throat. Will give PRN pain management. Patient is seen for observation. Nurse educated patient on SBAR, labs, Medications taken, room environment and care plan. Patient is agreeable with care plan and is eager to leave tomorrow.

## 2024-04-15 NOTE — PLAN OF CARE
Problem: Pain  Goal: Verbalizes/displays adequate comfort level or baseline comfort level  Outcome: Progressing     Problem: Discharge Planning  Goal: Discharge to home or other facility with appropriate resources  Outcome: Progressing  Flowsheets  Taken 4/15/2024 1753  Discharge to home or other facility with appropriate resources: Identify barriers to discharge with patient and caregiver  Taken 4/15/2024 1732  Discharge to home or other facility with appropriate resources: Arrange for needed discharge resources and transportation as appropriate

## 2024-04-15 NOTE — ANESTHESIA POSTPROCEDURE EVALUATION
Department of Anesthesiology  Postprocedure Note    Patient: Stone Guan  MRN: [id number]  YOB: 1965  Date of evaluation: 4/15/2024    Procedure Summary       Date: 04/15/24 Room / Location: [facility] MAIN 01 / [facility] MAIN OR    Anesthesia Start: 1111 Anesthesia Stop: 1450    Procedure: TOTAL THYROIDECTOMY (Right: Neck) Diagnosis:       Multinodular goiter      Dysphagia, unspecified type      (Multinodular goiter [E04.2])      (Dysphagia, unspecified type [R13.10])    Surgeons: Sabino Calixto MD Responsible Provider: Celestine Roca MD    Anesthesia Type: General ASA Status: 2            Anesthesia Type: General    Carmina Phase I:      Carmina Phase II:      Anesthesia Post Evaluation    Patient location during evaluation: PACU  Patient participation: complete - patient participated  Level of consciousness: sleepy but conscious  Pain score: 0  Airway patency: patent  Nausea & Vomiting: no nausea and no vomiting  Cardiovascular status: blood pressure returned to baseline  Respiratory status: acceptable  Hydration status: euvolemic  Pain management: adequate    No notable events documented.

## 2024-04-15 NOTE — ANESTHESIA PRE PROCEDURE
Department of Anesthesiology  Preprocedure Note       Name:  Stone Guan   Age:  58 y.o.  :  1965                                          MRN:  [id number]         Date:  4/15/2024      Surgeon: Surgeon(s):  Sabino Calixto MD    Procedure: Procedure(s):  TOTAL THYROIDECTOMY    Medications prior to admission:   Prior to Admission medications    Medication Sig Start Date End Date Taking? Authorizing Provider   simvastatin (ZOCOR) 40 MG tablet take 1 tablet by mouth every evening 24   Qiana Mays MD   hydroCHLOROthiazide (HYDRODIURIL) 25 MG tablet Take 1 tablet by mouth daily 23   Qiana Mays MD   Multiple Vitamin (THERA/BETA-CAROTENE) TABS Take 1 tablet by mouth daily    Provider, MD Davin       Current medications:    Current Facility-Administered Medications   Medication Dose Route Frequency Provider Last Rate Last Admin   • lidocaine PF 1 % injection 1 mL  1 mL IntraDERmal Once PRN Kurt Kwon APRN - CRNA       • famotidine (PEPCID) tablet 20 mg  20 mg Oral Once Kurt Kwon APRN - CRNA       • lactated ringers IV soln infusion   IntraVENous Continuous Kurt Kwon APRN - CRNA       • sodium chloride flush 0.9 % injection 5-40 mL  5-40 mL IntraVENous PRN Kurt Kwon APRN - CRNA           Allergies:  No Known Allergies    Problem List:    Patient Active Problem List   Diagnosis Code   • Facet arthropathy M47.819   • Heart murmur R01.1   • Multiple thyroid nodules E04.2   • Iritis H20.9   • Nausea R11.0   • Cervical spondylosis with myelopathy M47.12       Past Medical History:        Diagnosis Date   • Facet arthropathy    • Heart murmur    • High cholesterol    • Hypertension    • Iritis    • Low back pain    • Nausea    • Spinal stenosis    • Thyroid disease     Goiter        Past Surgical History:        Procedure Laterality Date   • ORTHOPEDIC SURGERY  2021    back surgery, neck surgery        Social History:    Social History     Tobacco

## 2024-04-15 NOTE — PERIOP NOTE
TRANSFER - OUT REPORT:    Telephone report given to Dianna on Stone Guan  being transferred to [facility] for routine post-op       Report consisted of patient's Situation, Background, Assessment and   Recommendations(SBAR).     Information from the following report(s) MAR was reviewed with the receiving nurse.           Lines:   Peripheral IV 04/15/24 Posterior;Right Hand (Active)   Site Assessment Clean, dry & intact 04/15/24 1653   Line Status Infusing 04/15/24 1653   Phlebitis Assessment No symptoms 04/15/24 1653   Infiltration Assessment 0 04/15/24 1653   Dressing Status New dressing applied 04/15/24 1443   Dressing Type Transparent 04/15/24 1653   Dressing Intervention New 04/15/24 1443        Opportunity for questions and clarification was provided.      Patient transported with:  Registered Nurse and Tech

## 2024-04-15 NOTE — PROGRESS NOTES
Advance Care Planning   Healthcare Decision Maker:      Click here to complete Healthcare Decision Makers including selection of the Healthcare Decision Maker Relationship (ie \"Primary\").  Today we documented Decision Maker(s) consistent with Legal Next of Kin hierarchy.       Rey Guan Spouse     Primary Phone: [phone] (M)Home Phone: [phone] Phone: [phone]           conducted an initial consultation and Spiritual Assessment for Stone Guan, who is a 58 y.o.,female. Patient's Primary Language is: English.   According to the patient's EMR Religion Affiliation is: Scientologist.     The reason the Patient came to the hospital is:   Patient Active Problem List    Diagnosis Date Noted    Thyroid enlargement 04/15/2024    Cervical spondylosis with myelopathy 06/07/2021    Multiple thyroid nodules 01/11/2016    Facet arthropathy     Nausea     Heart murmur     Iritis         The  provided the following Interventions:  Initiated a relationship of care and support.   Provided information about Spiritual Care Services.  Chart reviewed.    The following outcomes where achieved:  Patient expressed gratitude for 's visit.    Assessment:  Patient does not have any Presybeterian/cultural needs that will affect patient's preferences in health care.  There are no spiritual or Presybeterian issues which require intervention at this time.     Plan:  Chaplains will continue to follow and will provide pastoral care on an as needed/requested basis.   recommends bedside caregivers page  on duty if patient shows signs of acute spiritual or emotional distress.    Chaplain [place]  Spiritual Care   [phone]

## 2024-04-16 VITALS
HEIGHT: 66 IN | TEMPERATURE: 98 F | BODY MASS INDEX: 28.45 KG/M2 | SYSTOLIC BLOOD PRESSURE: 146 MMHG | DIASTOLIC BLOOD PRESSURE: 73 MMHG | WEIGHT: 177 LBS | OXYGEN SATURATION: 100 % | RESPIRATION RATE: 18 BRPM | HEART RATE: 97 BPM

## 2024-04-16 LAB
ALBUMIN SERPL-MCNC: 3.3 G/DL (ref 3.4–5)
CALCIUM SERPL-MCNC: 8.9 MG/DL (ref 8.5–10.1)

## 2024-04-16 PROCEDURE — 2580000003 HC RX 258: Performed by: OTOLARYNGOLOGY

## 2024-04-16 PROCEDURE — 6360000002 HC RX W HCPCS: Performed by: OTOLARYNGOLOGY

## 2024-04-16 PROCEDURE — 82040 ASSAY OF SERUM ALBUMIN: CPT

## 2024-04-16 PROCEDURE — G0378 HOSPITAL OBSERVATION PER HR: HCPCS

## 2024-04-16 PROCEDURE — 36415 COLL VENOUS BLD VENIPUNCTURE: CPT

## 2024-04-16 PROCEDURE — 96374 THER/PROPH/DIAG INJ IV PUSH: CPT

## 2024-04-16 PROCEDURE — 6370000000 HC RX 637 (ALT 250 FOR IP): Performed by: OTOLARYNGOLOGY

## 2024-04-16 PROCEDURE — 82310 ASSAY OF CALCIUM: CPT

## 2024-04-16 PROCEDURE — 94761 N-INVAS EAR/PLS OXIMETRY MLT: CPT

## 2024-04-16 RX ORDER — CEPHALEXIN 500 MG/1
500 CAPSULE ORAL 3 TIMES DAILY
Qty: 30 CAPSULE | Refills: 0 | Status: SHIPPED | OUTPATIENT
Start: 2024-04-16 | End: 2024-04-26

## 2024-04-16 RX ORDER — LEVOTHYROXINE SODIUM 0.12 MG/1
125 TABLET ORAL DAILY
Qty: 30 TABLET | Refills: 3 | Status: SHIPPED | OUTPATIENT
Start: 2024-04-16

## 2024-04-16 RX ORDER — HYDROCODONE BITARTRATE AND ACETAMINOPHEN 5; 325 MG/1; MG/1
1 TABLET ORAL EVERY 4 HOURS PRN
Qty: 25 TABLET | Refills: 0 | Status: SHIPPED | OUTPATIENT
Start: 2024-04-16 | End: 2024-04-21

## 2024-04-16 RX ADMIN — HYDROCHLOROTHIAZIDE 25 MG: 25 TABLET ORAL at 09:21

## 2024-04-16 RX ADMIN — HYDROCODONE BITARTRATE AND ACETAMINOPHEN 1 TABLET: 5; 325 TABLET ORAL at 09:32

## 2024-04-16 RX ADMIN — HYDROMORPHONE HYDROCHLORIDE 0.5 MG: 1 INJECTION, SOLUTION INTRAMUSCULAR; INTRAVENOUS; SUBCUTANEOUS at 03:02

## 2024-04-16 RX ADMIN — SODIUM CHLORIDE, PRESERVATIVE FREE 10 ML: 5 INJECTION INTRAVENOUS at 09:35

## 2024-04-16 RX ADMIN — LEVOTHYROXINE SODIUM 125 MCG: 25 TABLET ORAL at 09:21

## 2024-04-16 ASSESSMENT — PAIN DESCRIPTION - DESCRIPTORS
DESCRIPTORS: DISCOMFORT;SORE
DESCRIPTORS: BURNING;SORE

## 2024-04-16 ASSESSMENT — PAIN SCALES - GENERAL
PAINLEVEL_OUTOF10: 6
PAINLEVEL_OUTOF10: 4
PAINLEVEL_OUTOF10: 9

## 2024-04-16 ASSESSMENT — PAIN DESCRIPTION - ORIENTATION: ORIENTATION: MID

## 2024-04-16 ASSESSMENT — PAIN DESCRIPTION - LOCATION
LOCATION: THROAT
LOCATION: THROAT

## 2024-04-16 ASSESSMENT — PAIN - FUNCTIONAL ASSESSMENT: PAIN_FUNCTIONAL_ASSESSMENT: ACTIVITIES ARE NOT PREVENTED

## 2024-04-16 ASSESSMENT — PAIN SCALES - WONG BAKER: WONGBAKER_NUMERICALRESPONSE: NO HURT

## 2024-04-16 NOTE — PLAN OF CARE
Problem: Pain  Goal: Verbalizes/displays adequate comfort level or baseline comfort level  Outcome: Adequate for Discharge     Problem: Discharge Planning  Goal: Discharge to home or other facility with appropriate resources  Outcome: Adequate for Discharge     Problem: Safety - Adult  Goal: Free from fall injury  Outcome: Adequate for Discharge

## 2024-04-16 NOTE — PROGRESS NOTES
Patient being discharged home with spouse. Nurse educated patient on discharge summary and medications taken. Patient is agreeable with plan of care. Patient is adequate for discharge at this time.  will transport her home.

## 2024-04-16 NOTE — CARE COORDINATION
Discharge order noted for today. Orders received. No needs identified at this time. Case management remains available as needed.

## 2024-04-16 NOTE — ADT AUTH CERT
Sabino Calixto MD  Physician  Otolaryngology     Op Note     Signed     Date of Service: 4/15/2024 11:15 AM  Case Time: Procedures: Surgeons:   4/15/2024 11:15 AM TOTAL THYROIDECTOMY    Sabino Calixto MD               Signed         Operative Note        Patient: Stone Guan  YOB: 1965  MRN: [id number]     Date of Procedure: 4/15/2024     Pre-Op Diagnosis Codes:     * Multinodular goiter [E04.2]     * Dysphagia, unspecified type [R13.10]     Post-Op Diagnosis: Same       Procedure(s):  TOTAL THYROIDECTOMY     Surgeon(s):  Sabino Calixto MD     Assistant:   Surgical Assistant: Juan C Williamson     Anesthesia: General     Estimated Blood Loss (mL): less than 50      Complications: None     Specimens:   ID Type Source Tests Collected by Time Destination   A : total thyroid long stitch is left superior pole Tissue Thyroid SURGICAL PATHOLOGY Sabino Calixto MD 4/15/2024 1249           Implants:  * No implants in log *      Drains:        Closed/Suction Drain Superior;Midline Bulb (Active)   Site Description Clean, dry & intact 04/15/24 1653   Dressing Status Clean, dry & intact 04/15/24 1653   Drainage Appearance Bright red 04/15/24 1653   Drain Status Compressed;To bulb suction 04/15/24 1653         Findings:  Infection Present At Time Of Surgery (PATOS) (choose all levels that have infection present):  No infection present  Other Findings: Recurrent laryngeal nerves found bilaterally  Difficult case due to substernal and subclavicular extension     Detailed Description of Procedure:   See op summary     Electronically signed by Sabino Calixto MD on 4/15/2024 at 5:48 PM               Electronically signed by Sabino Calixto MD at 4/15/2024  5:49 PM    Sabino Calixto MD  Physician  Otolaryngology     Progress Notes     Signed     Date of Service: 4/16/2024  6:51 AM     Signed         Otolaryngology head neck surgery  Patient seen and examined  Postop day 1  Patient offering no

## 2024-04-16 NOTE — PROGRESS NOTES
Otolaryngology head neck surgery  Patient seen and examined  Postop day 1  Patient offering no complaints  Drain with moderate amount of serosanguineous drainage at least 50 in canister  Wound healing well no evidence of any hematoma  Calcium and PTH level both within normal limits  Impression status post total thyroidectomy for thyroid goiter    Plan  Patient doing well  Will discharge to home this morning  Will discharge to home with Synthroid 0.125 mg p.o. daily Norco and Keflex  Patient will follow-up with me in 2 days time for drain removal

## 2024-04-16 NOTE — CARE COORDINATION
04/16/24 0836   Service Assessment   Patient Orientation Alert and Oriented;Person;Place;Situation;Self   Cognition Alert   History Provided By Patient   Primary Caregiver Self   Support Systems Spouse/Significant Other   PCP Verified by CM Yes   Last Visit to PCP Within last 3 months   Prior Functional Level Independent in ADLs/IADLs   Current Functional Level Independent in ADLs/IADLs   Can patient return to prior living arrangement Yes   Ability to make needs known: Good   Family able to assist with home care needs: Yes   Would you like for me to discuss the discharge plan with any other family members/significant others, and if so, who? No   Social/Functional History   Lives With Spouse   Home Layout Two level   Home Access Stairs to enter with rails   ADL Assistance Independent   Homemaking Assistance Independent   Homemaking Responsibilities Yes   Ambulation Assistance Independent   Transfer Assistance Independent   Active  Yes   Occupation Full time employment   Discharge Planning   Type of Residence House   Living Arrangements Spouse/Significant Other   Current Services Prior To Admission None   Potential Assistance Needed N/A   DME Ordered? No   Type of Home Care Services None   Patient expects to be discharged to: House   Services At/After Discharge   [place] Resource Information Provided? No   Mode of Transport at Discharge Other (see comment)  ( to transport)   Confirm Follow Up Transport Family   Condition of Participation: Discharge Planning   The Plan for Transition of Care is related to the following treatment goals: Home with selfcare and availbale resources

## 2024-04-16 NOTE — CARE COORDINATION
04/16/24 0835   IMM Letter   Observation Status Letter date given: 04/16/24   Observation Status Letter time given: 0836   Observation Status Letter given to Patient/Family/Significant other/Guardian/POA/by: Marce Harman RN     Observation notice provided in writing to patient as well as verbal explanation of the policy.  Patients who are in outpatient status also receive the Observation notice

## 2024-04-16 NOTE — DISCHARGE SUMMARY
[address] [place]  [address]                            DISCHARGE SUMMARY      PATIENT NAME: KIRSTEN FLORES            : 1965  MED REC NO: [id number]                       ROOM: 201  ACCOUNT NO: [id number]                       ADMIT DATE: 04/15/2024  PROVIDER: Sabino Calixto MD    DISCHARGE DATE:  2024    ATTENDING PHYSICIAN:  SABINO CALIXTO    ADMISSION DIAGNOSIS:  Thyromegaly.    DISCHARGE DIAGNOSIS:  Thyromegaly.    OPERATION PERFORMED:  Total thyroidectomy.    OPERATIVE FINDINGS:  Significant substernal component of both lobes of thyroid.    HOSPITAL COURSE:  The patient was admitted, underwent total thyroidectomy.  No difficulties noted intraoperatively, however, some difficulty noted in removal due to the size as well as the substernal extent.    Postoperatively, the patient's PTH was noted to be 40.  Her calcium was noted to be within normal limits upon discharge as well.    The patient's drain was kept in place.  She was noted to have at least 50 mL of serosanguineous fluid.  Hence we elected to maintain the drain, we will remove in several days.    The patient will be discharged home.    DISCHARGE MEDICATIONS:  Include Synthroid 0.125 mg p.o. daily as well as Norco and Keflex.  The patient to utilize bacitracin ointment to the wound on a b.i.d. basis x3 days and will follow up with me in 2 days' time for drain removal.        SABINO CALIXTO MD      PTM/AQS  D:  2024 06:51:41  T:  2024 10:22:58  JOB #:  [id number]

## 2024-04-17 NOTE — OP NOTE
Operative Note      Patient: Stone Guan  YOB: 1965  MRN: [id number]    Date of Procedure: 4/15/2024    Pre-Op Diagnosis Codes:     * Multinodular goiter [E04.2]     * Dysphagia, unspecified type [R13.10]    Post-Op Diagnosis: Same       Procedure(s):  TOTAL THYROIDECTOMY    Surgeon(s):  Sabino Calixto MD    Assistant:   Surgical Assistant: Juan C Williamson    Anesthesia: General    Estimated Blood Loss (mL): less than 50     Complications: None    Specimens:   ID Type Source Tests Collected by Time Destination   A : total thyroid long stitch is left superior pole Tissue Thyroid SURGICAL PATHOLOGY Sabino Calixto MD 4/15/2024 1249        Implants:  * No implants in log *      Drains:   Closed/Suction Drain Superior;Midline Bulb (Active)   Site Description Clean, dry & intact 04/15/24 1653   Dressing Status Clean, dry & intact 04/15/24 1653   Drainage Appearance Bright red 04/15/24 1653   Drain Status Compressed;To bulb suction 04/15/24 1653       Findings:  Infection Present At Time Of Surgery (PATOS) (choose all levels that have infection present):  No infection present  Other Findings: Recurrent laryngeal nerves found bilaterally  Difficult case due to substernal and subclavicular extension    Detailed Description of Procedure:   See op summary    Electronically signed by Sabino Calixto MD on 4/15/2024 at 5:48 PM    
[id number]

## 2024-05-24 ENCOUNTER — HOSPITAL ENCOUNTER (OUTPATIENT)
Facility: HOSPITAL | Age: 59
Setting detail: SPECIMEN
End: 2024-05-24
Payer: OTHER GOVERNMENT

## 2024-05-24 DIAGNOSIS — E04.2 MULTIPLE THYROID NODULES: ICD-10-CM

## 2024-05-24 DIAGNOSIS — R73.9 ELEVATED BLOOD SUGAR: ICD-10-CM

## 2024-05-24 DIAGNOSIS — I10 ESSENTIAL (PRIMARY) HYPERTENSION: ICD-10-CM

## 2024-05-24 LAB
ALBUMIN SERPL-MCNC: 3.8 G/DL (ref 3.4–5)
ALBUMIN/GLOB SERPL: 1.1 (ref 0.8–1.7)
ALP SERPL-CCNC: 92 U/L (ref 45–117)
ALT SERPL-CCNC: 48 U/L (ref 13–56)
ANION GAP SERPL CALC-SCNC: 7 MMOL/L (ref 3–18)
AST SERPL-CCNC: 25 U/L (ref 10–38)
BILIRUB SERPL-MCNC: 0.5 MG/DL (ref 0.2–1)
BUN SERPL-MCNC: 16 MG/DL (ref 7–18)
BUN/CREAT SERPL: 21 (ref 12–20)
CALCIUM SERPL-MCNC: 9.5 MG/DL (ref 8.5–10.1)
CHLORIDE SERPL-SCNC: 103 MMOL/L (ref 100–111)
CHOLEST SERPL-MCNC: 175 MG/DL
CO2 SERPL-SCNC: 30 MMOL/L (ref 21–32)
CREAT SERPL-MCNC: 0.77 MG/DL (ref 0.6–1.3)
EST. AVERAGE GLUCOSE BLD GHB EST-MCNC: 100 MG/DL
GLOBULIN SER CALC-MCNC: 3.5 G/DL (ref 2–4)
GLUCOSE SERPL-MCNC: 107 MG/DL (ref 74–99)
HBA1C MFR BLD: 5.1 % (ref 4.2–5.6)
HDLC SERPL-MCNC: 50 MG/DL (ref 40–60)
HDLC SERPL: 3.5 (ref 0–5)
LDLC SERPL CALC-MCNC: 116.6 MG/DL (ref 0–100)
LIPID PANEL: ABNORMAL
POTASSIUM SERPL-SCNC: 3.5 MMOL/L (ref 3.5–5.5)
PROT SERPL-MCNC: 7.3 G/DL (ref 6.4–8.2)
SODIUM SERPL-SCNC: 140 MMOL/L (ref 136–145)
T4 FREE SERPL-MCNC: 1.8 NG/DL (ref 0.7–1.5)
TRIGL SERPL-MCNC: 42 MG/DL
TSH SERPL DL<=0.05 MIU/L-ACNC: <0.01 UIU/ML (ref 0.36–3.74)
VLDLC SERPL CALC-MCNC: 8.4 MG/DL

## 2024-05-24 PROCEDURE — 36415 COLL VENOUS BLD VENIPUNCTURE: CPT

## 2024-05-24 PROCEDURE — 84439 ASSAY OF FREE THYROXINE: CPT

## 2024-05-24 PROCEDURE — 80061 LIPID PANEL: CPT

## 2024-05-24 PROCEDURE — 84443 ASSAY THYROID STIM HORMONE: CPT

## 2024-05-24 PROCEDURE — 83036 HEMOGLOBIN GLYCOSYLATED A1C: CPT

## 2024-05-24 PROCEDURE — 80053 COMPREHEN METABOLIC PANEL: CPT

## 2024-07-10 ENCOUNTER — HOSPITAL ENCOUNTER (OUTPATIENT)
Facility: HOSPITAL | Age: 59
Setting detail: SPECIMEN
Discharge: HOME | End: 2024-07-13
Payer: OTHER GOVERNMENT

## 2024-07-10 DIAGNOSIS — E05.90 HYPERTHYROIDISM: ICD-10-CM

## 2024-07-10 LAB
T4 FREE SERPL-MCNC: 1.7 NG/DL (ref 0.7–1.5)
TSH SERPL DL<=0.05 MIU/L-ACNC: 0.01 UIU/ML (ref 0.36–3.74)

## 2024-07-10 PROCEDURE — 84443 ASSAY THYROID STIM HORMONE: CPT

## 2024-07-10 PROCEDURE — 36415 COLL VENOUS BLD VENIPUNCTURE: CPT

## 2024-07-10 PROCEDURE — 84439 ASSAY OF FREE THYROXINE: CPT

## 2024-07-19 ENCOUNTER — HOSPITAL ENCOUNTER (OUTPATIENT)
Facility: HOSPITAL | Age: 59
Discharge: HOME | End: 2024-07-19
Payer: OTHER GOVERNMENT

## 2024-07-19 LAB
T4 FREE SERPL-MCNC: 1.6 NG/DL (ref 0.7–1.5)
TSH SERPL DL<=0.05 MIU/L-ACNC: <0.01 UIU/ML (ref 0.36–3.74)

## 2024-07-19 PROCEDURE — 84443 ASSAY THYROID STIM HORMONE: CPT

## 2024-07-19 PROCEDURE — 36415 COLL VENOUS BLD VENIPUNCTURE: CPT

## 2024-07-19 PROCEDURE — 84439 ASSAY OF FREE THYROXINE: CPT

## 2024-09-24 ENCOUNTER — HOSPITAL ENCOUNTER (OUTPATIENT)
Facility: HOSPITAL | Age: 59
Setting detail: SPECIMEN
Discharge: HOME | End: 2024-09-27
Payer: OTHER GOVERNMENT

## 2024-09-24 DIAGNOSIS — E78.00 PURE HYPERCHOLESTEROLEMIA, UNSPECIFIED: ICD-10-CM

## 2024-09-24 DIAGNOSIS — R73.9 ELEVATED BLOOD SUGAR: ICD-10-CM

## 2024-09-24 DIAGNOSIS — I10 ESSENTIAL (PRIMARY) HYPERTENSION: ICD-10-CM

## 2024-09-24 DIAGNOSIS — E05.90 HYPERTHYROIDISM: ICD-10-CM

## 2024-09-24 LAB
ALBUMIN SERPL-MCNC: 3.9 G/DL (ref 3.4–5)
ALBUMIN/GLOB SERPL: 1.1 (ref 0.8–1.7)
ALP SERPL-CCNC: 81 U/L (ref 45–117)
ALT SERPL-CCNC: 29 U/L (ref 13–56)
ANION GAP SERPL CALC-SCNC: 5 MMOL/L (ref 3–18)
AST SERPL-CCNC: 15 U/L (ref 10–38)
BILIRUB SERPL-MCNC: 0.6 MG/DL (ref 0.2–1)
BUN SERPL-MCNC: 14 MG/DL (ref 7–18)
BUN/CREAT SERPL: 18 (ref 12–20)
CALCIUM SERPL-MCNC: 9.3 MG/DL (ref 8.5–10.1)
CHLORIDE SERPL-SCNC: 103 MMOL/L (ref 100–111)
CHOLEST SERPL-MCNC: 204 MG/DL
CO2 SERPL-SCNC: 31 MMOL/L (ref 21–32)
CREAT SERPL-MCNC: 0.8 MG/DL (ref 0.6–1.3)
EST. AVERAGE GLUCOSE BLD GHB EST-MCNC: 103 MG/DL
GLOBULIN SER CALC-MCNC: 3.4 G/DL (ref 2–4)
GLUCOSE SERPL-MCNC: 99 MG/DL (ref 74–99)
HBA1C MFR BLD: 5.2 % (ref 4.2–5.6)
HDLC SERPL-MCNC: 61 MG/DL (ref 40–60)
HDLC SERPL: 3.3 (ref 0–5)
LDLC SERPL CALC-MCNC: 133 MG/DL (ref 0–100)
LIPID PANEL: ABNORMAL
POTASSIUM SERPL-SCNC: 3.5 MMOL/L (ref 3.5–5.5)
PROT SERPL-MCNC: 7.3 G/DL (ref 6.4–8.2)
SODIUM SERPL-SCNC: 139 MMOL/L (ref 136–145)
T4 FREE SERPL-MCNC: 1.4 NG/DL (ref 0.7–1.5)
TRIGL SERPL-MCNC: 50 MG/DL
TSH SERPL DL<=0.05 MIU/L-ACNC: 0.02 UIU/ML (ref 0.36–3.74)
VLDLC SERPL CALC-MCNC: 10 MG/DL

## 2024-09-24 PROCEDURE — 36415 COLL VENOUS BLD VENIPUNCTURE: CPT

## 2024-09-24 PROCEDURE — 84443 ASSAY THYROID STIM HORMONE: CPT

## 2024-09-24 PROCEDURE — 83036 HEMOGLOBIN GLYCOSYLATED A1C: CPT

## 2024-09-24 PROCEDURE — 80061 LIPID PANEL: CPT

## 2024-09-24 PROCEDURE — 84439 ASSAY OF FREE THYROXINE: CPT

## 2024-09-24 PROCEDURE — 80053 COMPREHEN METABOLIC PANEL: CPT

## 2024-10-01 ENCOUNTER — OFFICE VISIT (OUTPATIENT)
Facility: CLINIC | Age: 59
End: 2024-10-01
Payer: OTHER GOVERNMENT

## 2024-10-01 VITALS
HEART RATE: 89 BPM | BODY MASS INDEX: 26.58 KG/M2 | RESPIRATION RATE: 16 BRPM | WEIGHT: 165.4 LBS | SYSTOLIC BLOOD PRESSURE: 116 MMHG | HEIGHT: 66 IN | OXYGEN SATURATION: 98 % | DIASTOLIC BLOOD PRESSURE: 77 MMHG | TEMPERATURE: 98.3 F

## 2024-10-01 DIAGNOSIS — E05.90 HYPERTHYROIDISM: Primary | ICD-10-CM

## 2024-10-01 DIAGNOSIS — E78.00 HYPERCHOLESTEROLEMIA: ICD-10-CM

## 2024-10-01 DIAGNOSIS — R73.9 ELEVATED BLOOD SUGAR: ICD-10-CM

## 2024-10-01 DIAGNOSIS — I10 ESSENTIAL (PRIMARY) HYPERTENSION: ICD-10-CM

## 2024-10-01 PROCEDURE — 99214 OFFICE O/P EST MOD 30 MIN: CPT | Performed by: FAMILY MEDICINE

## 2024-10-01 PROCEDURE — 3074F SYST BP LT 130 MM HG: CPT | Performed by: FAMILY MEDICINE

## 2024-10-01 PROCEDURE — 3078F DIAST BP <80 MM HG: CPT | Performed by: FAMILY MEDICINE

## 2024-10-01 RX ORDER — LEVOTHYROXINE SODIUM 75 UG/1
75 TABLET ORAL DAILY
Qty: 90 TABLET | Refills: 3 | Status: SHIPPED | OUTPATIENT
Start: 2024-10-01

## 2024-10-01 RX ORDER — LEVOTHYROXINE SODIUM 88 UG/1
88 TABLET ORAL DAILY
COMMUNITY
Start: 2024-07-29 | End: 2024-10-01 | Stop reason: DRUGHIGH

## 2024-10-01 ASSESSMENT — PATIENT HEALTH QUESTIONNAIRE - PHQ9
SUM OF ALL RESPONSES TO PHQ QUESTIONS 1-9: 0
SUM OF ALL RESPONSES TO PHQ QUESTIONS 1-9: 0
2. FEELING DOWN, DEPRESSED OR HOPELESS: NOT AT ALL
1. LITTLE INTEREST OR PLEASURE IN DOING THINGS: NOT AT ALL
SUM OF ALL RESPONSES TO PHQ9 QUESTIONS 1 & 2: 0
SUM OF ALL RESPONSES TO PHQ QUESTIONS 1-9: 0
SUM OF ALL RESPONSES TO PHQ QUESTIONS 1-9: 0

## 2024-10-01 ASSESSMENT — ANXIETY QUESTIONNAIRES
7. FEELING AFRAID AS IF SOMETHING AWFUL MIGHT HAPPEN: NOT AT ALL
5. BEING SO RESTLESS THAT IT IS HARD TO SIT STILL: NOT AT ALL
GAD7 TOTAL SCORE: 0
4. TROUBLE RELAXING: NOT AT ALL
3. WORRYING TOO MUCH ABOUT DIFFERENT THINGS: NOT AT ALL
6. BECOMING EASILY ANNOYED OR IRRITABLE: NOT AT ALL
1. FEELING NERVOUS, ANXIOUS, OR ON EDGE: NOT AT ALL
2. NOT BEING ABLE TO STOP OR CONTROL WORRYING: NOT AT ALL
IF YOU CHECKED OFF ANY PROBLEMS ON THIS QUESTIONNAIRE, HOW DIFFICULT HAVE THESE PROBLEMS MADE IT FOR YOU TO DO YOUR WORK, TAKE CARE OF THINGS AT HOME, OR GET ALONG WITH OTHER PEOPLE: NOT DIFFICULT AT ALL

## 2024-10-01 NOTE — PROGRESS NOTES
\"Have you been to the ER, urgent care clinic since your last visit?  Hospitalized since your last visit?\"    NO    “Have you seen or consulted any other health care providers [facility]?”    This include any pap smears or colon screening. Yes, OBG[facility]- [facility]     Have you had a mammogram?”   YES - Where: Yes, [facility]BG[facility]- [facility]  Nurse/CMA to request most recent records if not in the chart    No breast cancer screening on file      “Have you had a pap smear?”    YES - Where: Yes, [facility]  Nurse/CMA to request most recent records if not in the chart    No cervical cancer screening on file     Click Here for Release of Records Request

## 2024-10-01 NOTE — PROGRESS NOTES
HPI:  Stone Guan is a 59 y.o. female who presents today with   Chief Complaint   Patient presents with    Thyroid Problem     4 month follow-up          History of Present Illness  The patient presents for a follow-up on thyroid management.    She reports an improvement in her condition, with a decrease in sluggishness. She is currently on levothyroxine 88 mcg, which was prescribed in 07/2024. She has been maintaining an active lifestyle, walking daily, which has resulted in weight loss. She has also made dietary changes, reducing her sugar intake. She continues to take simvastatin and multivitamins.    She reports no palpitations, heat intolerance, or excessive sweating. Her previous issue with palpitations has resolved post thyroidectomy.. A recent examination by an ENT specialist showed no abnormalities, with her vocal cords functioning properly. She reports no difficulty swallowing.    She notes that the skin under her thumb nails is unusually thick, particularly on her thumbs. She recently had gel polish applied to her nails.      Weight - Scale: 75 kg (165 lb 6.4 oz) (with shoes)       Wt Readings from Last 3 Encounters:   10/01/24 75 kg (165 lb 6.4 oz)   05/28/24 77.7 kg (171 lb 6.4 oz)   05/01/24 79.2 kg (174 lb 9.6 oz)         Lab Results   Component Value Date    TSH 0.02 (L) 09/24/2024    T4FREE 1.4 09/24/2024       Lab Results   Component Value Date    CHOL 204 (H) 09/24/2024    TRIG 50 09/24/2024    HDL 61 (H) 09/24/2024     (H) 09/24/2024    VLDL 10 09/24/2024    CHOLHDLRATIO 3.3 09/24/2024     Lab Results   Component Value Date     09/24/2024    K 3.5 09/24/2024     09/24/2024    CO2 31 09/24/2024    BUN 14 09/24/2024    CREATININE 0.80 09/24/2024    GLUCOSE 99 09/24/2024    CALCIUM 9.3 09/24/2024    BILITOT 0.6 09/24/2024    ALKPHOS 81 09/24/2024    AST 15 09/24/2024    ALT 29 09/24/2024    LABGLOM 86 09/24/2024    GFRAA >60 04/25/2022    AGRATIO 1.1 11/08/2022    GLOB 3.4

## 2024-11-19 ENCOUNTER — HOSPITAL ENCOUNTER (OUTPATIENT)
Facility: HOSPITAL | Age: 59
Setting detail: SPECIMEN
Discharge: HOME | End: 2024-11-22
Payer: OTHER GOVERNMENT

## 2024-11-19 DIAGNOSIS — E05.90 HYPERTHYROIDISM: ICD-10-CM

## 2024-11-19 LAB
T4 FREE SERPL-MCNC: 1.2 NG/DL (ref 0.7–1.5)
TSH SERPL DL<=0.05 MIU/L-ACNC: 0.13 UIU/ML (ref 0.36–3.74)

## 2024-11-19 PROCEDURE — 84439 ASSAY OF FREE THYROXINE: CPT

## 2024-11-19 PROCEDURE — 84443 ASSAY THYROID STIM HORMONE: CPT

## 2024-11-19 PROCEDURE — 36415 COLL VENOUS BLD VENIPUNCTURE: CPT

## 2025-02-15 RX ORDER — HYDROCHLOROTHIAZIDE 25 MG/1
25 TABLET ORAL DAILY
Qty: 90 TABLET | Refills: 3 | Status: SHIPPED | OUTPATIENT
Start: 2025-02-15

## 2025-02-15 RX ORDER — SIMVASTATIN 40 MG
40 TABLET ORAL NIGHTLY
Qty: 90 TABLET | Refills: 3 | Status: SHIPPED | OUTPATIENT
Start: 2025-02-15

## 2025-03-07 ENCOUNTER — TRANSCRIBE ORDERS (OUTPATIENT)
Facility: HOSPITAL | Age: 60
End: 2025-03-07

## 2025-03-07 DIAGNOSIS — Z12.31 ENCOUNTER FOR SCREENING MAMMOGRAM FOR MALIGNANT NEOPLASM OF BREAST: Primary | ICD-10-CM

## 2025-03-07 SDOH — ECONOMIC STABILITY: INCOME INSECURITY: IN THE LAST 12 MONTHS, WAS THERE A TIME WHEN YOU WERE NOT ABLE TO PAY THE MORTGAGE OR RENT ON TIME?: NO

## 2025-03-07 SDOH — ECONOMIC STABILITY: FOOD INSECURITY: WITHIN THE PAST 12 MONTHS, YOU WORRIED THAT YOUR FOOD WOULD RUN OUT BEFORE YOU GOT MONEY TO BUY MORE.: NEVER TRUE

## 2025-03-07 SDOH — ECONOMIC STABILITY: FOOD INSECURITY: WITHIN THE PAST 12 MONTHS, THE FOOD YOU BOUGHT JUST DIDN'T LAST AND YOU DIDN'T HAVE MONEY TO GET MORE.: NEVER TRUE

## 2025-03-07 SDOH — ECONOMIC STABILITY: TRANSPORTATION INSECURITY
IN THE PAST 12 MONTHS, HAS LACK OF TRANSPORTATION KEPT YOU FROM MEETINGS, WORK, OR FROM GETTING THINGS NEEDED FOR DAILY LIVING?: NO

## 2025-03-07 SDOH — ECONOMIC STABILITY: TRANSPORTATION INSECURITY
IN THE PAST 12 MONTHS, HAS THE LACK OF TRANSPORTATION KEPT YOU FROM MEDICAL APPOINTMENTS OR FROM GETTING MEDICATIONS?: NO

## 2025-03-08 ENCOUNTER — HOSPITAL ENCOUNTER (OUTPATIENT)
Facility: HOSPITAL | Age: 60
Discharge: HOME | End: 2025-03-11
Payer: OTHER GOVERNMENT

## 2025-03-08 DIAGNOSIS — E78.00 HYPERCHOLESTEROLEMIA: ICD-10-CM

## 2025-03-08 DIAGNOSIS — I10 ESSENTIAL (PRIMARY) HYPERTENSION: ICD-10-CM

## 2025-03-08 DIAGNOSIS — E05.90 HYPERTHYROIDISM: ICD-10-CM

## 2025-03-08 LAB
ALBUMIN SERPL-MCNC: 3.9 G/DL (ref 3.4–5)
ALBUMIN/GLOB SERPL: 1.1 (ref 0.8–1.7)
ALP SERPL-CCNC: 87 U/L (ref 45–117)
ALT SERPL-CCNC: 45 U/L (ref 13–56)
ANION GAP SERPL CALC-SCNC: 6 MMOL/L (ref 3–18)
AST SERPL-CCNC: 23 U/L (ref 10–38)
BILIRUB SERPL-MCNC: 0.6 MG/DL (ref 0.2–1)
BUN SERPL-MCNC: 15 MG/DL (ref 7–18)
BUN/CREAT SERPL: 18 (ref 12–20)
CALCIUM SERPL-MCNC: 9.3 MG/DL (ref 8.5–10.1)
CHLORIDE SERPL-SCNC: 103 MMOL/L (ref 100–111)
CHOLEST SERPL-MCNC: 206 MG/DL
CO2 SERPL-SCNC: 30 MMOL/L (ref 21–32)
CREAT SERPL-MCNC: 0.83 MG/DL (ref 0.6–1.3)
EST. AVERAGE GLUCOSE BLD GHB EST-MCNC: 103 MG/DL
GLOBULIN SER CALC-MCNC: 3.7 G/DL (ref 2–4)
GLUCOSE SERPL-MCNC: 106 MG/DL (ref 74–99)
HBA1C MFR BLD: 5.2 % (ref 4.2–5.6)
HDLC SERPL-MCNC: 60 MG/DL (ref 40–60)
HDLC SERPL: 3.4 (ref 0–5)
LDLC SERPL CALC-MCNC: 133.8 MG/DL (ref 0–100)
LIPID PANEL: ABNORMAL
POTASSIUM SERPL-SCNC: 3.6 MMOL/L (ref 3.5–5.5)
PROT SERPL-MCNC: 7.6 G/DL (ref 6.4–8.2)
SODIUM SERPL-SCNC: 139 MMOL/L (ref 136–145)
T4 FREE SERPL-MCNC: 1.3 NG/DL (ref 0.7–1.5)
TRIGL SERPL-MCNC: 61 MG/DL
TSH SERPL DL<=0.05 MIU/L-ACNC: 0.56 UIU/ML (ref 0.36–3.74)
VLDLC SERPL CALC-MCNC: 12.2 MG/DL

## 2025-03-08 PROCEDURE — 84439 ASSAY OF FREE THYROXINE: CPT

## 2025-03-08 PROCEDURE — 84443 ASSAY THYROID STIM HORMONE: CPT

## 2025-03-08 PROCEDURE — 83036 HEMOGLOBIN GLYCOSYLATED A1C: CPT

## 2025-03-08 PROCEDURE — 80061 LIPID PANEL: CPT

## 2025-03-08 PROCEDURE — 80053 COMPREHEN METABOLIC PANEL: CPT

## 2025-03-08 PROCEDURE — 36415 COLL VENOUS BLD VENIPUNCTURE: CPT

## 2025-03-10 ENCOUNTER — OFFICE VISIT (OUTPATIENT)
Facility: CLINIC | Age: 60
End: 2025-03-10
Payer: OTHER GOVERNMENT

## 2025-03-10 VITALS
TEMPERATURE: 98.7 F | HEART RATE: 88 BPM | HEIGHT: 66 IN | WEIGHT: 166.6 LBS | BODY MASS INDEX: 26.78 KG/M2 | SYSTOLIC BLOOD PRESSURE: 112 MMHG | DIASTOLIC BLOOD PRESSURE: 71 MMHG | RESPIRATION RATE: 16 BRPM | OXYGEN SATURATION: 97 %

## 2025-03-10 DIAGNOSIS — R73.9 ELEVATED BLOOD SUGAR: ICD-10-CM

## 2025-03-10 DIAGNOSIS — E78.00 HYPERCHOLESTEROLEMIA: ICD-10-CM

## 2025-03-10 DIAGNOSIS — I10 PRIMARY HYPERTENSION: ICD-10-CM

## 2025-03-10 DIAGNOSIS — E04.9 THYROID ENLARGEMENT: Primary | ICD-10-CM

## 2025-03-10 PROCEDURE — 3074F SYST BP LT 130 MM HG: CPT | Performed by: FAMILY MEDICINE

## 2025-03-10 PROCEDURE — 99214 OFFICE O/P EST MOD 30 MIN: CPT | Performed by: FAMILY MEDICINE

## 2025-03-10 PROCEDURE — 3078F DIAST BP <80 MM HG: CPT | Performed by: FAMILY MEDICINE

## 2025-03-10 ASSESSMENT — PATIENT HEALTH QUESTIONNAIRE - PHQ9
SUM OF ALL RESPONSES TO PHQ QUESTIONS 1-9: 0
2. FEELING DOWN, DEPRESSED OR HOPELESS: NOT AT ALL
SUM OF ALL RESPONSES TO PHQ QUESTIONS 1-9: 0
1. LITTLE INTEREST OR PLEASURE IN DOING THINGS: NOT AT ALL

## 2025-03-10 ASSESSMENT — ANXIETY QUESTIONNAIRES
2. NOT BEING ABLE TO STOP OR CONTROL WORRYING: NOT AT ALL
4. TROUBLE RELAXING: NOT AT ALL
3. WORRYING TOO MUCH ABOUT DIFFERENT THINGS: NOT AT ALL
5. BEING SO RESTLESS THAT IT IS HARD TO SIT STILL: NOT AT ALL
7. FEELING AFRAID AS IF SOMETHING AWFUL MIGHT HAPPEN: NOT AT ALL
1. FEELING NERVOUS, ANXIOUS, OR ON EDGE: NOT AT ALL
6. BECOMING EASILY ANNOYED OR IRRITABLE: NOT AT ALL
GAD7 TOTAL SCORE: 0
IF YOU CHECKED OFF ANY PROBLEMS ON THIS QUESTIONNAIRE, HOW DIFFICULT HAVE THESE PROBLEMS MADE IT FOR YOU TO DO YOUR WORK, TAKE CARE OF THINGS AT HOME, OR GET ALONG WITH OTHER PEOPLE: NOT DIFFICULT AT ALL

## 2025-03-10 NOTE — PROGRESS NOTES
HPI:  Stone Guan is a 59 y.o. female who presents today with   Chief Complaint   Patient presents with    Thyroid Problem     5 month follow-up     Hypertension        History of Present Illness    Patient is here to follow-up on her thyroid.  Her thyroid medication was decreased at her last visit due to a decreased TSH.  Her T4 has been stable.  Patient has had a thyroidectomy for a thyroid nodule.  This is overall been stable.    Patient also has hypertension.  Her blood pressure is stable.  She continues to take hydrochlorothiazide.  She is also on simvastatin for her cholesterol.  She has had blood work and is here to review the same her blood work is as noted below.    Gyn exam UTD    Lab Results   Component Value Date    CHOL 206 (H) 03/08/2025    TRIG 61 03/08/2025    HDL 60 03/08/2025    .8 (H) 03/08/2025    VLDL 12.2 03/08/2025    CHOLHDLRATIO 3.4 03/08/2025     Lab Results   Component Value Date     03/08/2025    K 3.6 03/08/2025     03/08/2025    CO2 30 03/08/2025    BUN 15 03/08/2025    CREATININE 0.83 03/08/2025    GLUCOSE 106 (H) 03/08/2025    CALCIUM 9.3 03/08/2025    BILITOT 0.6 03/08/2025    ALKPHOS 87 03/08/2025    AST 23 03/08/2025    ALT 45 03/08/2025    LABGLOM 82 03/08/2025    GFRAA >60 04/25/2022    AGRATIO 1.1 11/08/2022    GLOB 3.7 03/08/2025         Wt Readings from Last 3 Encounters:   03/10/25 75.6 kg (166 lb 9.6 oz)   10/01/24 75 kg (165 lb 6.4 oz)   05/28/24 77.7 kg (171 lb 6.4 oz)     Hemoglobin A1C   Date Value Ref Range Status   03/08/2025 5.2 4.2 - 5.6 % Final     Comment:     (NOTE)  HbA1C Interpretive Ranges  <5.7              Normal  5.7 - 6.4         Consider Prediabetes  >6.5              Consider Diabetes                No data to display                  PMH,  Meds, Allergies, Family History, Social history reviewed      Current Outpatient Medications   Medication Sig Dispense Refill    simvastatin (ZOCOR) 40 MG tablet take 1 tablet by mouth every

## 2025-03-10 NOTE — PROGRESS NOTES
\"Have you been to the ER, urgent care clinic since your last visit?  Hospitalized since your last visit?\"    NO    “Have you seen or consulted any other health care providers outside our system since your last visit?”     Yes, OBG[facility]- [facility], ENT- Dr. EDENILSON Gallo, Ophthalmology- [facility]- [facility]      “Have you had a pap smear?”    YES - Where: 03/06/2025 [facility]BGYN- [facility] Nurse/CMA to request most recent records if not in the chart    No cervical cancer screening on file     “Have you had a eye exam?”    YES - Where: 11/20/2024 Ophthalmology- [facility] Nurse/CMA to request most recent records if not in the chart     No eye exam on file

## 2025-04-19 ENCOUNTER — HOSPITAL ENCOUNTER (OUTPATIENT)
Facility: HOSPITAL | Age: 60
Discharge: HOME | End: 2025-04-22
Payer: OTHER GOVERNMENT

## 2025-04-19 VITALS — WEIGHT: 164 LBS | BODY MASS INDEX: 26.36 KG/M2 | HEIGHT: 66 IN

## 2025-04-19 DIAGNOSIS — Z12.31 ENCOUNTER FOR SCREENING MAMMOGRAM FOR MALIGNANT NEOPLASM OF BREAST: ICD-10-CM

## 2025-04-19 PROCEDURE — 77063 BREAST TOMOSYNTHESIS BI: CPT

## 2025-06-13 ENCOUNTER — HOSPITAL ENCOUNTER (OUTPATIENT)
Facility: HOSPITAL | Age: 60
Discharge: HOME | End: 2025-06-16

## 2025-06-13 ENCOUNTER — TRANSCRIBE ORDERS (OUTPATIENT)
Facility: HOSPITAL | Age: 60
End: 2025-06-13

## 2025-06-13 DIAGNOSIS — R92.8 ABNORMAL MAMMOGRAM: ICD-10-CM

## 2025-06-13 DIAGNOSIS — N64.89 BREAST ASYMMETRY: ICD-10-CM

## 2025-06-13 DIAGNOSIS — R92.8 OTHER ABNORMAL AND INCONCLUSIVE FINDINGS ON DIAGNOSTIC IMAGING OF BREAST: Primary | ICD-10-CM

## 2025-07-25 ENCOUNTER — HOSPITAL ENCOUNTER (OUTPATIENT)
Facility: HOSPITAL | Age: 60
Discharge: HOME | End: 2025-07-28
Payer: OTHER GOVERNMENT

## 2025-07-25 PROCEDURE — 76642 ULTRASOUND BREAST LIMITED: CPT

## 2025-07-25 PROCEDURE — 77065 DX MAMMO INCL CAD UNI: CPT

## 2025-08-08 ENCOUNTER — HOSPITAL ENCOUNTER (OUTPATIENT)
Age: 60
Discharge: HOME | End: 2025-08-08
Payer: OTHER GOVERNMENT

## 2025-08-08 DIAGNOSIS — R73.9 ELEVATED BLOOD SUGAR: ICD-10-CM

## 2025-08-08 DIAGNOSIS — E78.00 HYPERCHOLESTEROLEMIA: ICD-10-CM

## 2025-08-08 DIAGNOSIS — I10 PRIMARY HYPERTENSION: ICD-10-CM

## 2025-08-08 DIAGNOSIS — E04.9 THYROID ENLARGEMENT: ICD-10-CM

## 2025-08-08 LAB
ALBUMIN SERPL-MCNC: 4 G/DL (ref 3.4–5)
ALBUMIN/GLOB SERPL: 1.2 (ref 0.8–1.7)
ALP SERPL-CCNC: 77 U/L (ref 45–117)
ALT SERPL-CCNC: 33 U/L (ref 10–35)
ANION GAP SERPL CALC-SCNC: 12 MMOL/L (ref 3–18)
AST SERPL-CCNC: 28 U/L (ref 10–38)
BILIRUB SERPL-MCNC: 0.5 MG/DL (ref 0.2–1)
BUN SERPL-MCNC: 14 MG/DL (ref 6–23)
BUN/CREAT SERPL: 17 (ref 12–20)
CALCIUM SERPL-MCNC: 10.3 MG/DL (ref 8.5–10.1)
CHLORIDE SERPL-SCNC: 102 MMOL/L (ref 98–107)
CHOLEST SERPL-MCNC: 202 MG/DL
CO2 SERPL-SCNC: 29 MMOL/L (ref 21–32)
CREAT SERPL-MCNC: 0.85 MG/DL (ref 0.6–1.3)
EST. AVERAGE GLUCOSE BLD GHB EST-MCNC: 109 MG/DL
GLOBULIN SER CALC-MCNC: 3.3 G/DL (ref 2–4)
GLUCOSE SERPL-MCNC: 103 MG/DL (ref 74–108)
HBA1C MFR BLD: 5.4 % (ref 4.2–5.6)
HDLC SERPL-MCNC: 50 MG/DL (ref 40–60)
HDLC SERPL: 4.1 (ref 0–5)
LDLC SERPL CALC-MCNC: 137 MG/DL (ref 0–100)
POTASSIUM SERPL-SCNC: 4.2 MMOL/L (ref 3.5–5.5)
PROT SERPL-MCNC: 7.2 G/DL (ref 6.4–8.2)
SODIUM SERPL-SCNC: 142 MMOL/L (ref 136–145)
T4 FREE SERPL-MCNC: 1.5 NG/DL (ref 0.9–1.7)
TRIGL SERPL-MCNC: 79 MG/DL (ref 0–150)
TSH, 3RD GENERATION: 0.45 UIU/ML (ref 0.27–4.2)
VLDLC SERPL CALC-MCNC: 16 MG/DL

## 2025-08-08 PROCEDURE — 80061 LIPID PANEL: CPT

## 2025-08-08 PROCEDURE — 83036 HEMOGLOBIN GLYCOSYLATED A1C: CPT

## 2025-08-08 PROCEDURE — 80053 COMPREHEN METABOLIC PANEL: CPT

## 2025-08-08 PROCEDURE — 36415 COLL VENOUS BLD VENIPUNCTURE: CPT

## 2025-08-08 PROCEDURE — 84439 ASSAY OF FREE THYROXINE: CPT

## 2025-08-08 PROCEDURE — 84443 ASSAY THYROID STIM HORMONE: CPT

## 2025-08-13 ENCOUNTER — TRANSCRIBE ORDERS (OUTPATIENT)
Facility: HOSPITAL | Age: 60
End: 2025-08-13

## 2025-08-13 DIAGNOSIS — M47.812 CERVICAL SPONDYLOSIS: ICD-10-CM

## 2025-08-13 DIAGNOSIS — S12.9XXA CLOSED FRACTURE OF CERVICAL VERTEBRA WITHOUT SPINAL CORD INJURY, UNSPECIFIED CERVICAL VERTEBRAL LEVEL, INITIAL ENCOUNTER (HCC): Primary | ICD-10-CM

## 2025-08-14 ENCOUNTER — OFFICE VISIT (OUTPATIENT)
Facility: CLINIC | Age: 60
End: 2025-08-14
Payer: OTHER GOVERNMENT

## 2025-08-14 DIAGNOSIS — I10 PRIMARY HYPERTENSION: ICD-10-CM

## 2025-08-14 DIAGNOSIS — E05.90 HYPERTHYROIDISM: ICD-10-CM

## 2025-08-14 DIAGNOSIS — E78.00 HYPERCHOLESTEROLEMIA: ICD-10-CM

## 2025-08-14 DIAGNOSIS — Z00.00 WELL WOMAN EXAM (NO GYNECOLOGICAL EXAM): Primary | ICD-10-CM

## 2025-08-14 DIAGNOSIS — R73.9 ELEVATED BLOOD SUGAR: ICD-10-CM

## 2025-08-14 PROBLEM — E04.9 THYROID ENLARGEMENT: Status: RESOLVED | Noted: 2024-04-15 | Resolved: 2025-08-14

## 2025-08-14 PROCEDURE — 99396 PREV VISIT EST AGE 40-64: CPT | Performed by: FAMILY MEDICINE

## 2025-08-14 PROCEDURE — 3078F DIAST BP <80 MM HG: CPT | Performed by: FAMILY MEDICINE

## 2025-08-14 PROCEDURE — 3074F SYST BP LT 130 MM HG: CPT | Performed by: FAMILY MEDICINE

## 2025-08-14 RX ORDER — LEVOTHYROXINE SODIUM 75 UG/1
75 TABLET ORAL DAILY
Qty: 90 TABLET | Refills: 3 | Status: SHIPPED | OUTPATIENT
Start: 2025-08-14

## 2025-08-14 SDOH — ECONOMIC STABILITY: FOOD INSECURITY: WITHIN THE PAST 12 MONTHS, THE FOOD YOU BOUGHT JUST DIDN'T LAST AND YOU DIDN'T HAVE MONEY TO GET MORE.: NEVER TRUE

## 2025-08-14 SDOH — ECONOMIC STABILITY: FOOD INSECURITY: WITHIN THE PAST 12 MONTHS, YOU WORRIED THAT YOUR FOOD WOULD RUN OUT BEFORE YOU GOT MONEY TO BUY MORE.: NEVER TRUE

## 2025-08-14 ASSESSMENT — PATIENT HEALTH QUESTIONNAIRE - PHQ9: DEPRESSION UNABLE TO ASSESS: PT REFUSES

## 2025-08-26 ENCOUNTER — HOSPITAL ENCOUNTER (OUTPATIENT)
Age: 60
Discharge: HOME | End: 2025-08-29
Payer: OTHER GOVERNMENT

## 2025-08-26 DIAGNOSIS — M47.812 CERVICAL SPONDYLOSIS: ICD-10-CM

## 2025-08-26 DIAGNOSIS — S12.9XXA CLOSED FRACTURE OF CERVICAL VERTEBRA WITHOUT SPINAL CORD INJURY, UNSPECIFIED CERVICAL VERTEBRAL LEVEL, INITIAL ENCOUNTER (HCC): ICD-10-CM

## 2025-08-26 PROCEDURE — 72125 CT NECK SPINE W/O DYE: CPT

## 2025-08-26 PROCEDURE — 72050 X-RAY EXAM NECK SPINE 4/5VWS: CPT

## (undated) DEVICE — SOLUTION IRRIG 1000ML 0.9% SOD CHL USP POUR PLAS BTL

## (undated) DEVICE — INSULATED BLADE ELECTRODE: Brand: EDGE

## (undated) DEVICE — INTENDED FOR TISSUE SEPARATION, AND OTHER PROCEDURES THAT REQUIRE A SHARP SURGICAL BLADE TO PUNCTURE OR CUT.: Brand: BARD-PARKER SAFETY BLADES SIZE 10, STERILE

## (undated) DEVICE — SUTURE VCRL SZ 3-0 L27IN ABSRB UD L26MM SH 1/2 CIR J416H

## (undated) DEVICE — 8FR FRAZIER SUCTION HANDLE: Brand: CARDINAL HEALTH

## (undated) DEVICE — DRAIN SURG W10MMXL20CM SIL FULL PERF HUBLESS FLAT RADPQ

## (undated) DEVICE — SUTURE PERMA-HAND SZ 2-0 L24IN NONABSORBABLE BLK W/O NDL SA75H

## (undated) DEVICE — SUTURE VCRL SZ 3-0 L18IN ABSRB VLT L26MM SH 1/2 CIR J774D

## (undated) DEVICE — APPLICATOR BNDG 1MM ADH PREMIERPRO EXOFIN

## (undated) DEVICE — APPLIER CLP L9.38IN M LIG TI DISP STR RNG HNDL LIGACLP

## (undated) DEVICE — WAX SURG 2.5GM HEMSTAT BNE BEESWAX PARAFFIN ISO PALMITATE

## (undated) DEVICE — BOTTLE  SPRAY  HYDROGEN PEROXIDE  EMPTY

## (undated) DEVICE — REM POLYHESIVE ADULT PATIENT RETURN ELECTRODE: Brand: VALLEYLAB

## (undated) DEVICE — Device

## (undated) DEVICE — X-RAY SPONGES,12 PLY: Brand: DERMACEA

## (undated) DEVICE — APPLIER RMFG CLP LIG MED 23.8 --

## (undated) DEVICE — GARMENT,MEDLINE,DVT,INT,CALF,MED, GEN2: Brand: MEDLINE

## (undated) DEVICE — 3.0MM PRECISION NEURO (MATCH HEAD)

## (undated) DEVICE — PACK PROCEDURE SURG MAJ W/ BASIN LF

## (undated) DEVICE — GLOVE SURG SZ 8 CRM LTX FREE POLYISOPRENE POLYMER BEAD ANTI

## (undated) DEVICE — SUTURE PROL SZ 5-0 L36IN NONABSORBABLE BLU L13MM C-1 3/8 8720H

## (undated) DEVICE — SUTURE PROL SZ 5-0 L18IN NONABSORBABLE BLU L16MM PC-3 3/8 8635G

## (undated) DEVICE — INTENDED FOR TISSUE SEPARATION, AND OTHER PROCEDURES THAT REQUIRE A SHARP SURGICAL BLADE TO PUNCTURE OR CUT.: Brand: BARD-PARKER ® CARBON RIB-BACK BLADES

## (undated) DEVICE — GELFOAM SZ 100 SPNG

## (undated) DEVICE — OPTIFOAM GENTLE SA, POSTOP, 4X8: Brand: MEDLINE

## (undated) DEVICE — CORD,CAUTERY,BIPOLAR,STERILE: Brand: MEDLINE

## (undated) DEVICE — Z DISCONTINUED USE 2220190 SUTURE VICRYL SZ 3-0 L27IN ABSRB UD L26MM SH 1/2 CIR J416H

## (undated) DEVICE — DRESSING FOAM DISK DIA1IN H 7MM HYDRPHLC CHG IMPREG IN SL

## (undated) DEVICE — PREP CHLORAPREP 10.5 ML ORG --

## (undated) DEVICE — CERVICAL COLLAR: Brand: DEROYAL

## (undated) DEVICE — SPIROMETER INCENT 2500ML W ONE W VLV

## (undated) DEVICE — STERILE POLYISOPRENE POWDER-FREE SURGICAL GLOVES: Brand: PROTEXIS

## (undated) DEVICE — CATHETER,URETHRAL,REDRUBBER,STRL,10FR: Brand: MEDLINE INDUSTRIES, INC.

## (undated) DEVICE — 3M™ TEGADERM™ HP TRANSPARENT FILM DRESSING FRAME STYLE, 9534HP, 2-3/8 X 2-3/4 IN (6 CM X 7 CM), 100/CT 4CT/CASE: Brand: 3M™ TEGADERM™

## (undated) DEVICE — PACKING 8004000 NEURAY 200PK 13X13MM: Brand: NEURAY ®

## (undated) DEVICE — SKIN PREP TRAY 4 COMPARTM TRAY: Brand: MEDLINE INDUSTRIES, INC.

## (undated) DEVICE — SPONGE DISSECT PNUT SM 3/8IN -- 5/PK

## (undated) DEVICE — COLLAR CERV L H3.25X23IN M DENS FOAM COT STOCK CVR LO

## (undated) DEVICE — DRAIN SURG W7MMXL20CM SIL FULL PERF HUBLESS FLAT RADPQ STRP

## (undated) DEVICE — MEDI-VAC SUCTION HIGH CAPACITY: Brand: CARDINAL HEALTH

## (undated) DEVICE — DRAPE,TOP,102X53,STERILE: Brand: MEDLINE

## (undated) DEVICE — SUTURE PERMAHAND SZ 3-0 L30IN NONABSORBABLE BLK SH L26MM K832H

## (undated) DEVICE — HEX-LOCKING BLADE ELECTRODE: Brand: EDGE

## (undated) DEVICE — KIT CLN UP BON SECOURS MARYV

## (undated) DEVICE — PIN SAFETY 2

## (undated) DEVICE — APPLIER CLP L9.375IN APER 2.1MM CLS L3.8MM 20 SM TI CLP

## (undated) DEVICE — STAPLER SKIN H3.9MM WIRE DIA0.58MM CRWN 6.9MM 35 STPL ROT

## (undated) DEVICE — SSC BONE WAX: Brand: SSC BONE WAX

## (undated) DEVICE — SKIN CLOS DERMABND PRINEO 60CM -- DERMABOUND PRINEO

## (undated) DEVICE — DRESSING GERM DIA1IN CNTR H DIA7MM BLU CHG ANTIMIC PROTCT

## (undated) DEVICE — SUTURE NONABSORBABLE MONOFILAMENT 5-0 PS-2 18 IN BLK ETHILON 1666H

## (undated) DEVICE — SHEET, DRAPE, SPLIT, STERILE: Brand: MEDLINE

## (undated) DEVICE — SYRINGE MED 3ML NDL 22GA L1 1/2IN REG BVL SFGLDE

## (undated) DEVICE — STOCKING COMPR L L16-18IN LNG 19MMHG ANK 9-10IN CALF

## (undated) DEVICE — SOLUTION IV 1000ML 0.9% SOD CHL

## (undated) DEVICE — TUBING, SUCTION, 3/16" X 12', STRAIGHT: Brand: MEDLINE

## (undated) DEVICE — 10FR FRAZIER SUCTION HANDLE: Brand: CARDINAL HEALTH

## (undated) DEVICE — GARMENT,MEDLINE,DVT,INT,THIGH,M, GEN2: Brand: MEDLINE

## (undated) DEVICE — ROUND DISSECTORS: Brand: DEROYAL

## (undated) DEVICE — INTENDED FOR TISSUE SEPARATION, AND OTHER PROCEDURES THAT REQUIRE A SHARP SURGICAL BLADE TO PUNCTURE OR CUT.: Brand: BARD-PARKER SAFETY BLADES SIZE 15, STERILE

## (undated) DEVICE — BLANKET WRM AD W50XL85.8IN PACU FULL BODY FORC AIR

## (undated) DEVICE — ELECTRODE PT RET AD L9FT HI MOIST COND ADH HYDRGEL CORDED

## (undated) DEVICE — BLADE ES ELASTOMERIC COAT INSUL DURABLE BEND UPTO 90DEG

## (undated) DEVICE — INSTRUMENT PAD: Brand: DEROYAL

## (undated) DEVICE — SUTURE MCRYL SZ 4-0 L18IN ABSRB UD L19MM PS-2 3/8 CIR PRIM Y496G

## (undated) DEVICE — SCREW EXT FIX L14MM FOR DISTRCTN

## (undated) DEVICE — SPONGE LAPAROTOMY W18XL18IN WHITE STRUNG RADIOPAQUE STERILE

## (undated) DEVICE — 3M™ TEGADERM™ TRANSPARENT FILM DRESSING FRAME STYLE, 1626W, 4 IN X 4-3/4 IN (10 CM X 12 CM), 50/CT 4CT/CASE: Brand: 3M™ TEGADERM™

## (undated) DEVICE — LINER,SEMI-RIGID,3000CC,50EA/CS: Brand: MEDLINE

## (undated) DEVICE — SUTURE PERMA-HAND SZ 3-0 L24IN NONABSORBABLE BLK W/O NDL SA74H